# Patient Record
Sex: FEMALE | Race: WHITE | Employment: FULL TIME | URBAN - METROPOLITAN AREA
[De-identification: names, ages, dates, MRNs, and addresses within clinical notes are randomized per-mention and may not be internally consistent; named-entity substitution may affect disease eponyms.]

---

## 2020-01-11 ENCOUNTER — HOSPITAL ENCOUNTER (EMERGENCY)
Age: 40
Discharge: HOME | End: 2020-01-11
Attending: EMERGENCY MEDICINE
Payer: COMMERCIAL

## 2020-01-11 VITALS
WEIGHT: 160 LBS | RESPIRATION RATE: 18 BRPM | TEMPERATURE: 98.1 F | BODY MASS INDEX: 24.25 KG/M2 | HEART RATE: 84 BPM | HEIGHT: 68 IN | SYSTOLIC BLOOD PRESSURE: 119 MMHG | OXYGEN SATURATION: 97 % | DIASTOLIC BLOOD PRESSURE: 87 MMHG

## 2020-01-11 DIAGNOSIS — R60.0 PERIORBITAL EDEMA: Primary | ICD-10-CM

## 2020-01-11 PROCEDURE — 99283 EMERGENCY DEPT VISIT LOW MDM: CPT | Performed by: EMERGENCY MEDICINE

## 2020-01-11 RX ORDER — LORATADINE 10 MG/1
10 TABLET ORAL DAILY
Qty: 30 TAB | Refills: 1 | Status: SHIPPED | OUTPATIENT
Start: 2020-01-11 | End: 2021-03-05

## 2020-01-11 RX ORDER — PREDNISONE 20 MG/1
60 TABLET ORAL DAILY
Qty: 15 TAB | Refills: 0 | Status: SHIPPED | OUTPATIENT
Start: 2020-01-11 | End: 2020-01-16

## 2020-01-11 RX ORDER — ZOLPIDEM TARTRATE 12.5 MG/1
12.5 TABLET, FILM COATED, EXTENDED RELEASE ORAL
COMMUNITY
Start: 2019-11-20

## 2020-01-11 NOTE — DISCHARGE INSTRUCTIONS
Use the medications as prescribed. Apply a cool compress or ice pack to the eyes to decrease swelling. Follow-up with your primary care physician or return to the emergency department for any new or acute concerns.

## 2020-01-11 NOTE — ED PROVIDER NOTES
Patient is a 69-year-old female who comes to the emergency department today worried about an allergic reaction. She states she is having swelling below both of her eyes since yesterday morning. No chest pain, dyspnea, no throat tightness, no eye pain or vision changes. States that she is prone to reactions. She did use some fake eyelashes the day before. But states she has used those before without reaction. Reports that she was seen at an urgent care facility yesterday and given a shot of steroids but today the swelling was not any better so she came here. The history is provided by the patient. Allergic Reaction    This is a new problem. The current episode started yesterday. The problem has not changed since onset. Pertinent negatives include no nausea and no shortness of breath. No past medical history on file. No past surgical history on file. No family history on file.     Social History     Socioeconomic History    Marital status:      Spouse name: Not on file    Number of children: Not on file    Years of education: Not on file    Highest education level: Not on file   Occupational History    Not on file   Social Needs    Financial resource strain: Not on file    Food insecurity:     Worry: Not on file     Inability: Not on file    Transportation needs:     Medical: Not on file     Non-medical: Not on file   Tobacco Use    Smoking status: Never Smoker    Smokeless tobacco: Never Used   Substance and Sexual Activity    Alcohol use: Not Currently    Drug use: Not on file    Sexual activity: Not on file   Lifestyle    Physical activity:     Days per week: Not on file     Minutes per session: Not on file    Stress: Not on file   Relationships    Social connections:     Talks on phone: Not on file     Gets together: Not on file     Attends Latter day service: Not on file     Active member of club or organization: Not on file     Attends meetings of clubs or organizations: Not on file     Relationship status: Not on file    Intimate partner violence:     Fear of current or ex partner: Not on file     Emotionally abused: Not on file     Physically abused: Not on file     Forced sexual activity: Not on file   Other Topics Concern    Not on file   Social History Narrative    Not on file         ALLERGIES: Sulfa (sulfonamide antibiotics)    Review of Systems   Constitutional: Negative for chills, fatigue and fever. HENT: Negative for congestion, rhinorrhea and sore throat. Eyes: Negative for photophobia, pain, discharge, redness, itching and visual disturbance. Respiratory: Negative for cough and shortness of breath. Cardiovascular: Negative for chest pain and palpitations. Gastrointestinal: Negative for abdominal pain, diarrhea and nausea. Endocrine: Negative for polydipsia and polyuria. Genitourinary: Negative for dysuria, frequency and urgency. Musculoskeletal: Negative for back pain and neck pain. Skin: Negative for rash. Neurological: Negative for seizures, syncope and weakness. Hematological: Negative. Vitals:    01/11/20 0932 01/11/20 0934   BP: 127/76    Pulse:  84   Resp:  18   Temp:  98.1 °F (36.7 °C)   SpO2:  98%   Weight:  72.6 kg (160 lb)   Height:  5' 8\" (1.727 m)            Physical Exam  Vitals signs and nursing note reviewed. Constitutional:       Appearance: She is well-developed. HENT:      Head: Normocephalic and atraumatic. Nose: Nose normal.      Mouth/Throat:      Pharynx: Oropharynx is clear. Eyes:      General: No scleral icterus. Right eye: No discharge. Left eye: No discharge. Extraocular Movements: Extraocular movements intact. Conjunctiva/sclera: Conjunctivae normal.      Pupils: Pupils are equal, round, and reactive to light. Comments: Mild infraorbital swelling bilaterally   Neck:      Musculoskeletal: Normal range of motion and neck supple.    Cardiovascular:      Rate and Rhythm: Normal rate and regular rhythm. Pulmonary:      Effort: Pulmonary effort is normal.      Breath sounds: Normal breath sounds. No stridor. No wheezing. Abdominal:      Palpations: Abdomen is soft. Tenderness: There is no tenderness. There is no guarding or rebound. Musculoskeletal: Normal range of motion. General: No deformity. Lymphadenopathy:      Cervical: No cervical adenopathy. Skin:     General: Skin is warm and dry. Findings: No rash. Neurological:      Mental Status: She is alert and oriented to person, place, and time. GCS: GCS eye subscore is 4. GCS verbal subscore is 5. GCS motor subscore is 6. Cranial Nerves: No cranial nerve deficit. Sensory: No sensory deficit. MDM  Number of Diagnoses or Management Options  Diagnosis management comments: No sign of anaphylaxis or systemic reaction at this time. No urticaria or itching. I think this may all be a local inflammatory response possibly from the eyelashes he used I will place her on to an antihistamine and a course of oral prednisone for a few days. Advised her to apply a cool compress for symptomatic relief as well. Voice dictation software was used during the making of this note. This software is not perfect and grammatical and other typographical errors may be present. This note has been proofread, but may still contain errors.   Alva Byrd MD; 1/11/2020 @9:54 AM   ===================================================================      Risk of Complications, Morbidity, and/or Mortality  Presenting problems: low  Diagnostic procedures: minimal  Management options: minimal    Patient Progress  Patient progress: stable         Procedures

## 2020-01-11 NOTE — ED TRIAGE NOTES
Pt in states swelling to eyes since yesterday. States seen at urgent care and given steroid injection. States swelling has not improved.

## 2020-01-11 NOTE — ED NOTES
I have reviewed discharge instructions with the patient. The patient verbalized understanding. Patient left ED via Discharge Method: ambulatory to Home with boyfriend. Opportunity for questions and clarification provided. Patient given 2 scripts. To continue your aftercare when you leave the hospital, you may receive an automated call from our care team to check in on how you are doing. This is a free service and part of our promise to provide the best care and service to meet your aftercare needs.  If you have questions, or wish to unsubscribe from this service please call [phone]. Thank you for Choosing our [facility] Emergency Department.

## 2021-03-05 ENCOUNTER — HOSPITAL ENCOUNTER (EMERGENCY)
Age: 41
Discharge: HOME | End: 2021-03-05
Attending: EMERGENCY MEDICINE | Admitting: EMERGENCY MEDICINE
Payer: COMMERCIAL

## 2021-03-05 ENCOUNTER — APPOINTMENT (OUTPATIENT)
Dept: GENERAL RADIOLOGY | Age: 41
End: 2021-03-05
Attending: EMERGENCY MEDICINE
Payer: COMMERCIAL

## 2021-03-05 VITALS
HEART RATE: 84 BPM | BODY MASS INDEX: 25.71 KG/M2 | WEIGHT: 160 LBS | RESPIRATION RATE: 12 BRPM | TEMPERATURE: 98.2 F | SYSTOLIC BLOOD PRESSURE: 138 MMHG | DIASTOLIC BLOOD PRESSURE: 61 MMHG | HEIGHT: 66 IN | OXYGEN SATURATION: 100 %

## 2021-03-05 DIAGNOSIS — M25.562 ACUTE PAIN OF LEFT KNEE: Primary | ICD-10-CM

## 2021-03-05 PROCEDURE — 73562 X-RAY EXAM OF KNEE 3: CPT

## 2021-03-05 PROCEDURE — 74011250637 HC RX REV CODE- 250/637: Performed by: PHYSICIAN ASSISTANT

## 2021-03-05 PROCEDURE — 99283 EMERGENCY DEPT VISIT LOW MDM: CPT

## 2021-03-05 RX ORDER — ACETAMINOPHEN 500 MG
1000 TABLET ORAL
Status: COMPLETED | OUTPATIENT
Start: 2021-03-05 | End: 2021-03-05

## 2021-03-05 RX ADMIN — ACETAMINOPHEN 1000 MG: 500 TABLET ORAL at 12:13

## 2021-03-05 NOTE — ED NOTES
I have reviewed discharge instructions with the patient. The patient verbalized understanding. Patient left ED via Discharge Method: ambulatory to Home. Opportunity for questions and clarification provided. Patient given 0 scripts. To continue your aftercare when you leave the hospital, you may receive an automated call from our care team to check in on how you are doing. This is a free service and part of our promise to provide the best care and service to meet your aftercare needs.  If you have questions, or wish to unsubscribe from this service please call [phone]. Thank you for Choosing our [facility] Emergency Department.

## 2021-03-05 NOTE — ED TRIAGE NOTES
Pt states she was playing tennis approx 30 minutes ago when her left knee buckled, she states she felt a \"pop\" and has been unable to put pressure on it since.

## 2021-03-05 NOTE — ED PROVIDER NOTES
PT is a 53-year-old female who presents to emergency room with chief complaint of left knee pain that occurred while playing tennis. She states she planted her foot felt a pop in her knee and then the knee gave out. She has been unable to bear weight without pain and sensation of knee giving out. Pain is aggravated by weight bearing. Alleviated by immobilization. The history is provided by the patient. Knee Pain   This is a new problem. The current episode started less than 1 hour ago. The problem has not changed since onset. The pain is present in the left knee. The quality of the pain is described as aching. There has been no history of extremity trauma. No past medical history on file. No past surgical history on file. No family history on file.     Social History     Socioeconomic History    Marital status:      Spouse name: Not on file    Number of children: Not on file    Years of education: Not on file    Highest education level: Not on file   Occupational History    Not on file   Social Needs    Financial resource strain: Not on file    Food insecurity     Worry: Not on file     Inability: Not on file    Transportation needs     Medical: Not on file     Non-medical: Not on file   Tobacco Use    Smoking status: Never Smoker    Smokeless tobacco: Never Used   Substance and Sexual Activity    Alcohol use: Not Currently    Drug use: Not on file    Sexual activity: Not on file   Lifestyle    Physical activity     Days per week: Not on file     Minutes per session: Not on file    Stress: Not on file   Relationships    Social connections     Talks on phone: Not on file     Gets together: Not on file     Attends [employer] service: Not on file     Active member of club or organization: Not on file     Attends meetings of clubs or organizations: Not on file     Relationship status: Not on file    Intimate partner violence     Fear of current or ex partner: Not on file Emotionally abused: Not on file     Physically abused: Not on file     Forced sexual activity: Not on file   Other Topics Concern    Not on file   Social History Narrative    Not on file         ALLERGIES: Sulfa (sulfonamide antibiotics)    Review of Systems   Constitutional: Negative for chills and fever. HENT: Negative for facial swelling. Respiratory: Negative for chest tightness and shortness of breath. Cardiovascular: Negative for chest pain. Gastrointestinal: Negative for abdominal pain, nausea and vomiting. Musculoskeletal: Positive for arthralgias (L knee). Negative for myalgias. Neurological: Negative for headaches. Psychiatric/Behavioral: Negative for confusion. Vitals:    03/05/21 1127 03/05/21 1133   BP: 138/61    Pulse: 84    Resp: 12    Temp:  98.2 °F (36.8 °C)   SpO2: 100%    Weight: 72.6 kg (160 lb)    Height: 5' 6\" (1.676 m)             Physical Exam  Constitutional:       Appearance: Normal appearance. HENT:      Head: Normocephalic and atraumatic. Mouth/Throat:      Mouth: Mucous membranes are moist.   Eyes:      Pupils: Pupils are equal, round, and reactive to light. Cardiovascular:      Rate and Rhythm: Normal rate and regular rhythm. Pulmonary:      Effort: No respiratory distress. Breath sounds: Normal breath sounds. Abdominal:      Palpations: Abdomen is soft. Tenderness: There is no abdominal tenderness. There is no guarding. Musculoskeletal:      Left knee: She exhibits swelling and bony tenderness. She exhibits no deformity. Skin:     General: Skin is warm and dry. Neurological:      Mental Status: She is alert and oriented to person, place, and time. Mental status is at baseline. Psychiatric:         Mood and Affect: Mood normal.          [facility]         Splint, Other    Date/Time: 3/5/2021 12:11 PM  Performed by: ROSS Tee  Authorized by:  ROSS Tee     Consent:     Consent obtained:  Verbal  Pre-procedure details: Sensation:  Normal  Procedure details:     Laterality:  Left    Location:  Knee    Knee:  L knee    Splint type:  Knee immobilizer  Post-procedure details:     Pain:  Improved    Sensation:  Normal    Patient tolerance of procedure:   Tolerated well, no immediate complications

## 2021-03-09 PROBLEM — S83.519A ACL (ANTERIOR CRUCIATE LIGAMENT) RUPTURE: Status: ACTIVE | Noted: 2021-03-09

## 2021-03-09 PROBLEM — S83.249A MEDIAL MENISCUS TEAR: Status: ACTIVE | Noted: 2021-03-09

## 2021-03-16 PROBLEM — L30.9 PERIORBITAL DERMATITIS: Status: ACTIVE | Noted: 2019-06-25

## 2021-03-16 PROBLEM — M25.562 LEFT KNEE PAIN: Status: ACTIVE | Noted: 2021-03-16

## 2021-03-16 PROBLEM — T78.3XXA ANGIO-EDEMA: Status: ACTIVE | Noted: 2019-06-25

## 2021-03-16 PROBLEM — L50.8 CHRONIC URTICARIA: Status: ACTIVE | Noted: 2019-06-25

## 2021-03-16 PROBLEM — T63.441A: Status: ACTIVE | Noted: 2019-06-25

## 2021-03-16 PROBLEM — S83.242A ACUTE MEDIAL MENISCUS TEAR, LEFT, INITIAL ENCOUNTER: Status: ACTIVE | Noted: 2021-03-09

## 2021-03-16 PROBLEM — K59.04 CHRONIC IDIOPATHIC CONSTIPATION: Status: ACTIVE | Noted: 2020-11-20

## 2021-03-16 PROBLEM — Z80.0 FAMILY HISTORY OF COLON CANCER: Status: ACTIVE | Noted: 2020-11-20

## 2021-03-16 PROBLEM — T78.2XXA ANAPHYLAXIS: Status: ACTIVE | Noted: 2019-06-25

## 2021-03-22 PROBLEM — S83.512A LEFT ANTERIOR CRUCIATE LIGAMENT TEAR: Status: ACTIVE | Noted: 2021-03-22

## 2021-03-30 ENCOUNTER — ANESTHESIA EVENT (OUTPATIENT)
Dept: SURGERY | Age: 41
End: 2021-03-30
Payer: COMMERCIAL

## 2021-03-31 ENCOUNTER — ANESTHESIA (OUTPATIENT)
Dept: SURGERY | Age: 41
End: 2021-03-31
Payer: COMMERCIAL

## 2021-03-31 ENCOUNTER — HOSPITAL ENCOUNTER (OUTPATIENT)
Age: 41
Setting detail: OUTPATIENT SURGERY
Discharge: HOME | End: 2021-03-31
Attending: ORTHOPAEDIC SURGERY | Admitting: ORTHOPAEDIC SURGERY
Payer: COMMERCIAL

## 2021-03-31 VITALS
RESPIRATION RATE: 16 BRPM | OXYGEN SATURATION: 100 % | HEART RATE: 68 BPM | SYSTOLIC BLOOD PRESSURE: 100 MMHG | TEMPERATURE: 98 F | WEIGHT: 162 LBS | DIASTOLIC BLOOD PRESSURE: 56 MMHG | BODY MASS INDEX: 26.15 KG/M2

## 2021-03-31 DIAGNOSIS — S83.512D RUPTURE OF ANTERIOR CRUCIATE LIGAMENT OF LEFT KNEE, SUBSEQUENT ENCOUNTER: ICD-10-CM

## 2021-03-31 DIAGNOSIS — S83.242A ACUTE MEDIAL MENISCUS TEAR, LEFT, INITIAL ENCOUNTER: ICD-10-CM

## 2021-03-31 LAB — HCG UR QL: NEGATIVE

## 2021-03-31 PROCEDURE — 77030040936 HC WND COBLATN S&N -C: Performed by: ORTHOPAEDIC SURGERY

## 2021-03-31 PROCEDURE — 2709999900 HC NON-CHARGEABLE SUPPLY: Performed by: ORTHOPAEDIC SURGERY

## 2021-03-31 PROCEDURE — 77030002991 HC SUT QUILL SSPC -B: Performed by: ORTHOPAEDIC SURGERY

## 2021-03-31 PROCEDURE — 77030003602 HC NDL NRV BLK BBMI -B: Performed by: ANESTHESIOLOGY

## 2021-03-31 PROCEDURE — 76010000162 HC OR TIME 1.5 TO 2 HR INTENSV-TIER 1: Performed by: ORTHOPAEDIC SURGERY

## 2021-03-31 PROCEDURE — 74011250636 HC RX REV CODE- 250/636: Performed by: ORTHOPAEDIC SURGERY

## 2021-03-31 PROCEDURE — 76060000034 HC ANESTHESIA 1.5 TO 2 HR: Performed by: ORTHOPAEDIC SURGERY

## 2021-03-31 PROCEDURE — 77030006891 HC BLD SHV RESECT STRY -B: Performed by: ORTHOPAEDIC SURGERY

## 2021-03-31 PROCEDURE — C1713 ANCHOR/SCREW BN/BN,TIS/BN: HCPCS | Performed by: ORTHOPAEDIC SURGERY

## 2021-03-31 PROCEDURE — C1762 CONN TISS, HUMAN(INC FASCIA): HCPCS | Performed by: ORTHOPAEDIC SURGERY

## 2021-03-31 PROCEDURE — 77030002922 HC SUT FBRWRE ARTH -B: Performed by: ORTHOPAEDIC SURGERY

## 2021-03-31 PROCEDURE — 77030003666 HC NDL SPINAL BD -A: Performed by: ORTHOPAEDIC SURGERY

## 2021-03-31 PROCEDURE — 76010010054 HC POST OP PAIN BLOCK: Performed by: ORTHOPAEDIC SURGERY

## 2021-03-31 PROCEDURE — 77030000032 HC CUF TRNQT ZIMM -B: Performed by: ORTHOPAEDIC SURGERY

## 2021-03-31 PROCEDURE — 76210000020 HC REC RM PH II FIRST 0.5 HR: Performed by: ORTHOPAEDIC SURGERY

## 2021-03-31 PROCEDURE — 81025 URINE PREGNANCY TEST: CPT

## 2021-03-31 PROCEDURE — 29888 ARTHRS AID ACL RPR/AGMNTJ: CPT | Performed by: ORTHOPAEDIC SURGERY

## 2021-03-31 PROCEDURE — 76010010054 HC POST OP PAIN BLOCK

## 2021-03-31 PROCEDURE — 74011250637 HC RX REV CODE- 250/637: Performed by: ORTHOPAEDIC SURGERY

## 2021-03-31 PROCEDURE — 77030037713 HC CLOSR DEV INCIS ZIP STRY -B: Performed by: ORTHOPAEDIC SURGERY

## 2021-03-31 PROCEDURE — 77030018673: Performed by: ORTHOPAEDIC SURGERY

## 2021-03-31 PROCEDURE — 74011000250 HC RX REV CODE- 250: Performed by: ANESTHESIOLOGY

## 2021-03-31 PROCEDURE — 77030002934 HC SUT MCRYL J&J -B: Performed by: ORTHOPAEDIC SURGERY

## 2021-03-31 PROCEDURE — 77030020274 HC MISC IMPL ORTHOPEDIC: Performed by: ORTHOPAEDIC SURGERY

## 2021-03-31 PROCEDURE — 77030006590 HC BLD ARTHSC GRFT J&J -C: Performed by: ORTHOPAEDIC SURGERY

## 2021-03-31 PROCEDURE — 74011250637 HC RX REV CODE- 250/637: Performed by: ANESTHESIOLOGY

## 2021-03-31 PROCEDURE — 76210000063 HC OR PH I REC FIRST 0.5 HR: Performed by: ORTHOPAEDIC SURGERY

## 2021-03-31 PROCEDURE — 77030019908 HC STETH ESOPH SIMS -A: Performed by: ANESTHESIOLOGY

## 2021-03-31 PROCEDURE — 76942 ECHO GUIDE FOR BIOPSY: CPT | Performed by: ORTHOPAEDIC SURGERY

## 2021-03-31 PROCEDURE — 74011000250 HC RX REV CODE- 250: Performed by: NURSE ANESTHETIST, CERTIFIED REGISTERED

## 2021-03-31 PROCEDURE — 77030010509 HC AIRWY LMA MSK TELE -A: Performed by: ANESTHESIOLOGY

## 2021-03-31 PROCEDURE — 77030040922 HC BLNKT HYPOTHRM STRY -A: Performed by: ANESTHESIOLOGY

## 2021-03-31 PROCEDURE — 74011250636 HC RX REV CODE- 250/636: Performed by: NURSE ANESTHETIST, CERTIFIED REGISTERED

## 2021-03-31 PROCEDURE — 29882 ARTHRS KNE SRG MNISC RPR M/L: CPT | Performed by: ORTHOPAEDIC SURGERY

## 2021-03-31 PROCEDURE — 77030002933 HC SUT MCRYL J&J -A: Performed by: ORTHOPAEDIC SURGERY

## 2021-03-31 PROCEDURE — 74011250636 HC RX REV CODE- 250/636: Performed by: ANESTHESIOLOGY

## 2021-03-31 PROCEDURE — 77030033005 HC TBNG ARTHSC PMP STRY -B: Performed by: ORTHOPAEDIC SURGERY

## 2021-03-31 PROCEDURE — 77030042407 HC SUT FBRWRE -B: Performed by: ORTHOPAEDIC SURGERY

## 2021-03-31 DEVICE — PASSER SUT ACL RECON DBL LD TIGHTROPE: Type: IMPLANTABLE DEVICE | Site: KNEE | Status: FUNCTIONAL

## 2021-03-31 DEVICE — KIT INT FIX W4.75XL19.1MM 2ND W/ SWIVELOCK FOR ACL/PCL REP: Type: IMPLANTABLE DEVICE | Site: KNEE | Status: FUNCTIONAL

## 2021-03-31 DEVICE — BUTTON FIX W8XL12MM TI ATTCH SYS ALLGRFT CONSTRUCT FOR: Type: IMPLANTABLE DEVICE | Site: KNEE | Status: FUNCTIONAL

## 2021-03-31 DEVICE — SYS TIGHTROPE ABS W UHMWPE --: Type: IMPLANTABLE DEVICE | Site: KNEE | Status: FUNCTIONAL

## 2021-03-31 RX ORDER — LIDOCAINE HYDROCHLORIDE 20 MG/ML
INJECTION, SOLUTION EPIDURAL; INFILTRATION; INTRACAUDAL; PERINEURAL AS NEEDED
Status: DISCONTINUED | OUTPATIENT
Start: 2021-03-31 | End: 2021-03-31 | Stop reason: HOSPADM

## 2021-03-31 RX ORDER — MIDAZOLAM HYDROCHLORIDE 1 MG/ML
2 INJECTION, SOLUTION INTRAMUSCULAR; INTRAVENOUS
Status: COMPLETED | OUTPATIENT
Start: 2021-03-31 | End: 2021-03-31

## 2021-03-31 RX ORDER — NALOXONE HYDROCHLORIDE 0.4 MG/ML
0.1 INJECTION, SOLUTION INTRAMUSCULAR; INTRAVENOUS; SUBCUTANEOUS
Status: DISCONTINUED | OUTPATIENT
Start: 2021-03-31 | End: 2021-03-31 | Stop reason: HOSPADM

## 2021-03-31 RX ORDER — LIDOCAINE HYDROCHLORIDE 10 MG/ML
0.1 INJECTION INFILTRATION; PERINEURAL AS NEEDED
Status: DISCONTINUED | OUTPATIENT
Start: 2021-03-31 | End: 2021-03-31 | Stop reason: HOSPADM

## 2021-03-31 RX ORDER — HYDROMORPHONE HYDROCHLORIDE 1 MG/ML
0.5 INJECTION, SOLUTION INTRAMUSCULAR; INTRAVENOUS; SUBCUTANEOUS
Status: DISCONTINUED | OUTPATIENT
Start: 2021-03-31 | End: 2021-03-31 | Stop reason: HOSPADM

## 2021-03-31 RX ORDER — MIDAZOLAM HYDROCHLORIDE 1 MG/ML
2 INJECTION, SOLUTION INTRAMUSCULAR; INTRAVENOUS
Status: DISCONTINUED | OUTPATIENT
Start: 2021-03-31 | End: 2021-03-31 | Stop reason: HOSPADM

## 2021-03-31 RX ORDER — PROPOFOL 10 MG/ML
INJECTION, EMULSION INTRAVENOUS AS NEEDED
Status: DISCONTINUED | OUTPATIENT
Start: 2021-03-31 | End: 2021-03-31 | Stop reason: HOSPADM

## 2021-03-31 RX ORDER — SODIUM CHLORIDE 0.9 % (FLUSH) 0.9 %
5-40 SYRINGE (ML) INJECTION AS NEEDED
Status: DISCONTINUED | OUTPATIENT
Start: 2021-03-31 | End: 2021-03-31 | Stop reason: HOSPADM

## 2021-03-31 RX ORDER — MINERAL OIL
OIL (ML) MISCELLANEOUS AS NEEDED
Status: DISCONTINUED | OUTPATIENT
Start: 2021-03-31 | End: 2021-03-31 | Stop reason: HOSPADM

## 2021-03-31 RX ORDER — HALOPERIDOL 5 MG/ML
1 INJECTION INTRAMUSCULAR
Status: DISCONTINUED | OUTPATIENT
Start: 2021-03-31 | End: 2021-03-31 | Stop reason: HOSPADM

## 2021-03-31 RX ORDER — TRANEXAMIC ACID 100 MG/ML
INJECTION, SOLUTION INTRAVENOUS AS NEEDED
Status: DISCONTINUED | OUTPATIENT
Start: 2021-03-31 | End: 2021-03-31 | Stop reason: HOSPADM

## 2021-03-31 RX ORDER — OXYCODONE HYDROCHLORIDE 5 MG/1
5 TABLET ORAL
Status: DISCONTINUED | OUTPATIENT
Start: 2021-03-31 | End: 2021-03-31 | Stop reason: HOSPADM

## 2021-03-31 RX ORDER — FENTANYL CITRATE 50 UG/ML
100 INJECTION, SOLUTION INTRAMUSCULAR; INTRAVENOUS
Status: COMPLETED | OUTPATIENT
Start: 2021-03-31 | End: 2021-03-31

## 2021-03-31 RX ORDER — ACETAMINOPHEN 500 MG
1000 TABLET ORAL ONCE
Status: COMPLETED | OUTPATIENT
Start: 2021-03-31 | End: 2021-03-31

## 2021-03-31 RX ORDER — KETOROLAC TROMETHAMINE 30 MG/ML
30 INJECTION, SOLUTION INTRAMUSCULAR; INTRAVENOUS ONCE
Status: COMPLETED | OUTPATIENT
Start: 2021-03-31 | End: 2021-03-31

## 2021-03-31 RX ORDER — DEXAMETHASONE SODIUM PHOSPHATE 4 MG/ML
INJECTION, SOLUTION INTRA-ARTICULAR; INTRALESIONAL; INTRAMUSCULAR; INTRAVENOUS; SOFT TISSUE
Status: COMPLETED | OUTPATIENT
Start: 2021-03-31 | End: 2021-03-31

## 2021-03-31 RX ORDER — FENTANYL CITRATE 50 UG/ML
INJECTION, SOLUTION INTRAMUSCULAR; INTRAVENOUS AS NEEDED
Status: DISCONTINUED | OUTPATIENT
Start: 2021-03-31 | End: 2021-03-31 | Stop reason: HOSPADM

## 2021-03-31 RX ORDER — FLUMAZENIL 0.1 MG/ML
0.2 INJECTION INTRAVENOUS
Status: DISCONTINUED | OUTPATIENT
Start: 2021-03-31 | End: 2021-03-31 | Stop reason: HOSPADM

## 2021-03-31 RX ORDER — SODIUM CHLORIDE, SODIUM LACTATE, POTASSIUM CHLORIDE, CALCIUM CHLORIDE 600; 310; 30; 20 MG/100ML; MG/100ML; MG/100ML; MG/100ML
100 INJECTION, SOLUTION INTRAVENOUS CONTINUOUS
Status: DISCONTINUED | OUTPATIENT
Start: 2021-03-31 | End: 2021-03-31 | Stop reason: HOSPADM

## 2021-03-31 RX ORDER — DIPHENHYDRAMINE HYDROCHLORIDE 50 MG/ML
12.5 INJECTION, SOLUTION INTRAMUSCULAR; INTRAVENOUS
Status: DISCONTINUED | OUTPATIENT
Start: 2021-03-31 | End: 2021-03-31 | Stop reason: HOSPADM

## 2021-03-31 RX ORDER — SODIUM CHLORIDE, SODIUM LACTATE, POTASSIUM CHLORIDE, CALCIUM CHLORIDE 600; 310; 30; 20 MG/100ML; MG/100ML; MG/100ML; MG/100ML
75 INJECTION, SOLUTION INTRAVENOUS CONTINUOUS
Status: DISCONTINUED | OUTPATIENT
Start: 2021-03-31 | End: 2021-03-31 | Stop reason: HOSPADM

## 2021-03-31 RX ORDER — ONDANSETRON 2 MG/ML
INJECTION INTRAMUSCULAR; INTRAVENOUS AS NEEDED
Status: DISCONTINUED | OUTPATIENT
Start: 2021-03-31 | End: 2021-03-31 | Stop reason: HOSPADM

## 2021-03-31 RX ORDER — SODIUM CHLORIDE 0.9 % (FLUSH) 0.9 %
5-40 SYRINGE (ML) INJECTION EVERY 8 HOURS
Status: DISCONTINUED | OUTPATIENT
Start: 2021-03-31 | End: 2021-03-31 | Stop reason: HOSPADM

## 2021-03-31 RX ORDER — CEFAZOLIN SODIUM/WATER 2 G/20 ML
2 SYRINGE (ML) INTRAVENOUS ONCE
Status: COMPLETED | OUTPATIENT
Start: 2021-03-31 | End: 2021-03-31

## 2021-03-31 RX ADMIN — DEXAMETHASONE SODIUM PHOSPHATE 4 MG: 4 INJECTION, SOLUTION INTRAMUSCULAR; INTRAVENOUS at 08:19

## 2021-03-31 RX ADMIN — ROPIVACAINE HYDROCHLORIDE 30 ML: 5 INJECTION, SOLUTION EPIDURAL; INFILTRATION; PERINEURAL at 08:16

## 2021-03-31 RX ADMIN — DEXAMETHASONE SODIUM PHOSPHATE 4 MG: 4 INJECTION, SOLUTION INTRAMUSCULAR; INTRAVENOUS at 08:16

## 2021-03-31 RX ADMIN — CEFAZOLIN 2 G: 1 INJECTION, POWDER, FOR SOLUTION INTRAVENOUS at 08:40

## 2021-03-31 RX ADMIN — SODIUM CHLORIDE, SODIUM LACTATE, POTASSIUM CHLORIDE, AND CALCIUM CHLORIDE 100 ML/HR: 600; 310; 30; 20 INJECTION, SOLUTION INTRAVENOUS at 07:40

## 2021-03-31 RX ADMIN — KETOROLAC TROMETHAMINE 30 MG: 30 INJECTION, SOLUTION INTRAMUSCULAR at 11:10

## 2021-03-31 RX ADMIN — FENTANYL CITRATE 25 MCG: 50 INJECTION INTRAMUSCULAR; INTRAVENOUS at 09:46

## 2021-03-31 RX ADMIN — ROPIVACAINE HYDROCHLORIDE 20 ML: 5 INJECTION, SOLUTION EPIDURAL; INFILTRATION; PERINEURAL at 08:19

## 2021-03-31 RX ADMIN — ONDANSETRON 4 MG: 2 INJECTION INTRAMUSCULAR; INTRAVENOUS at 09:50

## 2021-03-31 RX ADMIN — PROPOFOL 200 MG: 10 INJECTION, EMULSION INTRAVENOUS at 08:32

## 2021-03-31 RX ADMIN — ACETAMINOPHEN 1000 MG: 500 TABLET, FILM COATED ORAL at 08:13

## 2021-03-31 RX ADMIN — LIDOCAINE HYDROCHLORIDE 40 MG: 20 INJECTION, SOLUTION EPIDURAL; INFILTRATION; INTRACAUDAL; PERINEURAL at 08:32

## 2021-03-31 RX ADMIN — TRANEXAMIC ACID 1 G: 100 INJECTION, SOLUTION INTRAVENOUS at 08:42

## 2021-03-31 RX ADMIN — FENTANYL CITRATE 25 MCG: 50 INJECTION INTRAMUSCULAR; INTRAVENOUS at 08:54

## 2021-03-31 RX ADMIN — MIDAZOLAM 2 MG: 1 INJECTION INTRAMUSCULAR; INTRAVENOUS at 08:10

## 2021-03-31 RX ADMIN — HYDROMORPHONE HYDROCHLORIDE 0.5 MG: 1 INJECTION, SOLUTION INTRAMUSCULAR; INTRAVENOUS; SUBCUTANEOUS at 10:35

## 2021-03-31 RX ADMIN — HYDROMORPHONE HYDROCHLORIDE 0.5 MG: 1 INJECTION, SOLUTION INTRAMUSCULAR; INTRAVENOUS; SUBCUTANEOUS at 10:44

## 2021-03-31 RX ADMIN — FENTANYL CITRATE 100 MCG: 50 INJECTION, SOLUTION INTRAMUSCULAR; INTRAVENOUS at 08:10

## 2021-03-31 NOTE — DISCHARGE INSTRUCTIONS
ACTIVITY  · As tolerated and as directed by your doctor. · Bathe or shower as directed by your doctor. DIET  · Clear liquids until no nausea or vomiting; then light diet for the first day. · Advance to regular diet on second day, unless your doctor orders otherwise. · If nausea and vomiting continues, call your doctor. PAIN  · Take pain medication as directed by your doctor. · Call your doctor if pain is NOT relieved by medication. · DO NOT take aspirin of blood thinners unless directed by your doctor. DRESSING CARE       CALL YOUR DOCTOR IF   · Excessive bleeding that does not stop after holding pressure over the area  · Temperature of 101 degrees F or above  · Excessive redness, swelling or bruising, and/ or green or yellow, smelly discharge from incision    AFTER ANESTHESIA   · For the first 24 hours: DO NOT Drive, Drink alcoholic beverages, or Make important decisions. · Be aware of dizziness following anesthesia and while taking pain medication. APPOINTMENT DATE/ TIME    YOUR DOCTOR'S PHONE NUMBER       DISCHARGE SUMMARY from Nurse    PATIENT INSTRUCTIONS:    After general anesthesia or intravenous sedation, for 24 hours or while taking prescription Narcotics:  · Limit your activities  · Do not drive and operate hazardous machinery  · Do not make important personal or business decisions  · Do  not drink alcoholic beverages  · If you have not urinated within 8 hours after discharge, please contact your surgeon on call. *  Please give a list of your current medications to your Primary Care Provider. *  Please update this list whenever your medications are discontinued, doses are      changed, or new medications (including over-the-counter products) are added. *  Please carry medication information at all times in case of emergency situations.       These are general instructions for a healthy lifestyle:    No smoking/ No tobacco products/ Avoid exposure to second hand smoke    Surgeon General's Warning:  Quitting smoking now greatly reduces serious risk to your health. Obesity, smoking, and sedentary lifestyle greatly increases your risk for illness    A healthy diet, regular physical exercise & weight monitoring are important for maintaining a healthy lifestyle    You may be retaining fluid if you have a history of heart failure or if you experience any of the following symptoms:  Weight gain of 3 pounds or more overnight or 5 pounds in a week, increased swelling in our hands or feet or shortness of breath while lying flat in bed. Please call your doctor as soon as you notice any of these symptoms; do not wait until your next office visit. Recognize signs and symptoms of STROKE:    F-face looks uneven    A-arms unable to move or move unevenly    S-speech slurred or non-existent    T-time-call 911 as soon as signs and symptoms begin-DO NOT go       Back to bed or wait to see if you get better-TIME IS BRAIN. Patient Education        Learning About COVID-19 and Social Distancing  What is it? Social distancing means putting space between yourself and other people. The recommended distance is 6 feet, or about 2 meters. This also means staying away from any place where people may gather, such as [place] or other public gathering places. Why is it important? Social distancing is the best way to reduce the spread of COVID-19. This virus seems to spread from person to person through droplets from coughing and sneezing. So if you keep your distance from others, you're less likely to get it or spread it. And social distancing is important for everyone, not just those who are at high risk of infection, like older people. You might have the virus but not have symptoms. You could then give the infection to someone you come into contact with. How is it done? Putting 6 feet, or about 2 meters, between you and other people is the recommended distance.  Also stay away from any place where people may gather, such as [place] or other public gathering places. So if possible:  · Work from home, and keep your kids at home. · Don't travel if you don't have to. And avoid public transportation, ride-shares, and taxis unless you have no choice. · Limit shopping to essentials, like food and medicines. · Wear a cloth face cover if you have to go to a public place like the grocery store or pharmacy. · Don't eat in restaurants. (You can still get takeout or food deliveries.)  · Avoid crowds and busy places. Follow stay-at-home orders or other directions for your area. Where can you learn more? Go to http://www.gray.com/  Enter A133 in the search box to learn more about \"Learning About COVID-19 and Social Distancing. \"  Current as of: December 18, 2020               Content Version: 12.7  © 5757-1347 [employer]. Care instructions adapted under license by [employer] (which disclaims liability or warranty for this information). If you have questions about a medical condition or this instruction, always ask your healthcare professional. [place] [address] any warranty or liability for your use of this information. POST-OPERATIVE INFORMATION ACL RECONSTRUCTION    Returning Home  Your pain after surgery will vary depending on the method of anesthesia used and from patient to patient. In the first 24 hours, pain medication should be taken regularly with small amounts of food. During this time, nausea and light-headedness are common and should improve in 2-5 days. Drinking fluids may help. If nausea persists, medicine can be prescribed by calling your doctor at [phone]. Leaving the Outpatient Surgery Center:     As you leave the surgery center you might be given a CPM (continuous passive motion) machine and/or a Cold Therapy unit (Cryocuff or Game Ready). The CPM may be delivered to your house as well.    The CPM is used to help maintain your motion. YOU MAY START THE CPM THE DAY AFTER YOUR SURGERY. You may remove your brace to use your CPM machine. If you are able to, sleep with the machine on. If you are not able to sleep with the CPM, sleep with the immobilizer and use the CPM machine multiple times throughout the day (4-6 hours per day). Each day you may increase the flexion setting as you tolerate. You do not have to go farther than 90 degrees of flexion if you eventually reach that. You may start the CPM on the day of your surgery or the following day. You may notice your bandage is a little more saturated once you begin the motion. For Cold Therapy, always have a layer in between your skin and the wrap. The cold is to be used to help control pain and swelling. Follow the instructions given to you on operating the machine. You may take the ice wrap off when you are not icing. Dont use the cold therapy while using the CPM.    For the first week:   1. When lying in bed keep your knee higher than your heart to help with swelling. 2. Use crutches when out of bed. 3. When walking, you may touch your foot to the ground for balance as you feel comfortable. 4. The cryotherapy cold sleeve, will be put on in recovery to control swelling. The position of the cryocuff is critical. Make sure the cuff is empty when you tighten it down and then fill it. Make the top strap snug and the bottom strap looser. Re-chill the water once an hour or as needed. Take the cold therapy wrap off when going outside the home. 5. Wear immobilizer to sleep at night and when you are up and about. You may take the immobilizer off at home to work on knee and ankle motion. Care of your Incisions  1. The incision is often checked 6 to 10 days after surgery. 2. Moderate bleeding may occur at the incision sites. This should decrease quickly over time. 3. Leave the dressings from surgery in place for 3-4 days.  The bulky dressings may be removed and replaced with fresh gauze at that time, but leave on the small tape strips on the incision sites. Watch the wound for increasing redness, tenderness, swelling, and pus drainage daily. These can be early signs of infection. If you notice any of these signs of infection please call at [phone]. A mild fever during the first few days after surgery is not uncommon. This often occurs and can be treated with deep breathing, coughing to clear the lungs, and walking with crutches. However, fevers, increasing pain, and swelling at the incisions should be reported immediately. Showering:   Until your first post operative visit, you should consider wrapping your knee in saran wrap with tape for showering.  A plastic chair in your shower will allow you to sit.  Sponge bathing is also an option.  In general, once cleared by your physician you may allow your incisions to get wet in the shower. Post-Operative Pain Management  ANESTHESIA: You will meet with an anesthesiologist on the day of surgery to discuss your anesthesia. You will have general anesthesia and often will have a femoral nerve block. This will wear off so be ready to begin you pain medications in order to prevent a long period without pain control. MEDICATIONS: You will be given a prescription for medications. Please take them as directed on the label and with food.  Certain pain medications may contain Tylenol(Acetaminophen). It is important not to take any additional Tylenol while on these pain medications.  Do not mix your pain medications with alcohol.  You should not drive while taking pain medications as they increase your liability and delay your responses.  Your physician will most likely prescribe to you Aspirin 325 mg (ECASA) daily for three weeks after surgery. This is done in order to help minimize the risk for a blood clot from developing, which is a possible complication after any surgery.    You will go home with white stockings on your legs called EDUAR hose. They are used to help with swelling and blood clots. After your first visit they will usually be discontinued.  If you have any questions or concerns regarding your medications, please call the office. · Common side effects of the narcotics include nausea, vomiting, drowsiness, constipation, and difficulty urinating. If you experience constipation, drink lots of water/Gatorade, avoid soda and diet drinks. Eat plenty of fiber. You may take a stool softener: Colace 100mg twice a day for the first week. For SEVERE constipation use magnesium citrate, one 8 oz bottle. All can be bought at the pharmacy. Diet and General Conditioning   Aerobic conditioning and diet are both very important after surgery. In general, we recommend that you make sure to avoid skipping meals, eat a balanced diet including regular portions of fruits and vegetables, and avoid relying on fast foods while you are recovering from surgery. Also, consider taking a daily multi-vitamin. Participate in some form of aerobic conditioning after surgery. Speak with your physical therapist or call our office to determine an appropriate form of exercise after surgery. Initially, your exercise will need to be modified after surgery. Follow-up Visits   Doctor  Plan on seeing your surgeon at 1 week, 1 month, 3 months, and 6 months after surgery. If your knee does not progress as planned, you are welcome to schedule additional visits. There is usually no charge for surgery related visits 90 days following surgery. You will receive a bill for any x-rays or special equipment (such as a brace). Physical Therapy   Your Therapist may schedule more visits depending on your progress.  First visit 2-5 days after surgery.  Weekly visits from week 1 through week 6.  Then every other week for one month.  Monthly from 3 months through 6 months.     Physical Therapy  Goals for the first week  1. *Maintain maximum extension (straightening). 2. * Minimize or eliminate swelling. 3. Activate the thigh muscle. 4. Achieve greater than 90 degrees of bending. 5. Promote incision healing. Home Exercises Begin performing these exercises within the first 24 hours following surgery. 1. Throughout the day, take off the immobilizer and ice machine. Prop your foot up on pillows for 10 minutes so that your knee is not supported. Allow the knee to straighten fully. Do quad sets periodically by tightening the muscle on top of the thigh so that your knee cap moves toward you. Hold for five seconds and relax. 2. Perform heel slides multiple times throughout the day. Take the brace and ice off and slide your heel toward your bottom within a comfortable range of motion; help the leg with your hands. 3. Perform straight leg lifts in the knee immobilizer a couple times a day. Start by doing a quad set (above). Then lift your entire leg off the table starting with the heel. The knee should not bend. If it does, you should not perform this exercise. 4. Perform ankle pumps by moving foot up and down. Do these throughout the day. When to stop using  Immobilizer and crutches: Your Physical Therapist will help you to determine the appropriate time to wean out of your brace and off of crutches. Use these guidelines to help.  Gradually wean off the crutches after the first 2-3 weeks. You may place as much weight on your leg in the brace with the crutches as you feel comfortable with. Begin with 2 crutches all the time, then one crutch at home and 2 outside, no crutches at home and one outside, no crutches.  When you can straighten your leg fully and do a straight leg lift you can wean off the immobilizer for day use, still sleep in it.  When your knee is straight for 3 weeks then you can wean out of the immobilizer when you sleep. Your knee must remain straight.     Cold Therapy: You can discontinue using the cryocuff or game ready as the pain decreases. You may use cold therapy for control of pain and swelling. Use for 20-30 minutes at a time throughout the day as you desire. Issues after Surgery   Clicks and Pops- it is common for patients to experience sensations of clicks and pops in the first few months after surgery. It will resolve with time.  Pain around or just below the knee cap is common after surgery. It will resolve as your quad muscle strengthens with physical therapy. If you call the office please have us paged instead of leaving a voice mail so that we can immediately take care of your needs.    Phone [phone]              Fax [phone]

## 2021-03-31 NOTE — ANESTHESIA PREPROCEDURE EVALUATION
Relevant Problems   No relevant active problems       Anesthetic History   No history of anesthetic complications            Review of Systems / Medical History  Patient summary reviewed and pertinent labs reviewed    Pulmonary  Within defined limits                 Neuro/Psych   Within defined limits           Cardiovascular  Within defined limits                Exercise tolerance: >4 METS     GI/Hepatic/Renal  Within defined limits              Endo/Other  Within defined limits           Other Findings              Physical Exam    Airway  Mallampati: I  TM Distance: > 6 cm  Neck ROM: normal range of motion   Mouth opening: Normal     Cardiovascular  Regular rate and rhythm,  S1 and S2 normal,  no murmur, click, rub, or gallop             Dental  No notable dental hx       Pulmonary  Breath sounds clear to auscultation               Abdominal  GI exam deferred       Other Findings            Anesthetic Plan    ASA: 1  Anesthesia type: general      Post-op pain plan if not by surgeon: peripheral nerve block single    Induction: Intravenous  Anesthetic plan and risks discussed with: Patient

## 2021-03-31 NOTE — ANESTHESIA PROCEDURE NOTES
Peripheral Block    Start time: 3/31/2021 8:18 AM  End time: 3/31/2021 8:19 AM  Performed by: Hank Ny MD  Authorized by: Hank Ny MD       Pre-procedure: Indications: at surgeon's request and post-op pain management    Preanesthetic Checklist: patient identified, risks and benefits discussed, site marked, timeout performed, anesthesia consent given and patient being monitored    Timeout Time: 08:17          Block Type:   Block Type: Adductor canal  Laterality:  Left  Monitoring:  Standard ASA monitoring, continuous pulse ox, frequent vital sign checks, heart rate, responsive to questions and oxygen  Injection Technique:  Single shot  Procedures: ultrasound guided    Patient Position: supine  Prep: chlorhexidine    Location:  Mid thigh  Needle Type:  Stimuplex  Needle Gauge:  22 G  Needle Localization:  Ultrasound guidance  Medication Injected:  Ropivacaine 0.375% with epi 1:200,000 in NS injection, 20 mL (Mixture components: ropivacaine (PF) 5 mg/mL (0.5 %) Soln, . 75 mL; EPINEPHrine HCl (PF) 1 mg/mL (1 mL) Soln, . 005 mL; 0.9% sodium chloride Soln, .245 mL)  dexamethasone (DECADRON) 4 mg/mL injection, 4 mg  Med Admin Time: 3/31/2021 8:19 AM    Assessment:  Number of attempts:  1  Injection Assessment:  Incremental injection every 5 mL, local visualized surrounding nerve on ultrasound, negative aspiration for CSF, negative aspiration for blood, no paresthesia, no intravascular symptoms and ultrasound image on chart  Patient tolerance:  Patient tolerated the procedure well with no immediate complications

## 2021-03-31 NOTE — H&P
Outpatient Surgery History and Physical:  Hugo Roldan was seen and examined. CHIEF COMPLAINT:   Left knee pain. PE:     Visit Vitals  LMP 03/19/2021       Heart:   Regular rhythm, regular pulses. Lungs:  Are clear, non-labored respirations. Past Medical History:    Patient Active Problem List    Diagnosis    Left anterior cruciate ligament tear    Left knee pain    ACL (anterior cruciate ligament) rupture    Acute medial meniscus tear, left, initial encounter    Chronic idiopathic constipation    Family history of colon cancer    Anaphylaxis     Last Assessment & Plan:   Does have EpiPen and reviewed when/how to use it.  Angio-edema     Last Assessment & Plan:   Sending numerous labs - looking at auto-immune, celiac, alpha-gal, HAE causes - if flare occurs - take Prednisone 20 mg twice daily x 3 days (provided) and can add on Benadryl 25-50 mg.  Journal any associated factors - foods, stressors, menses, alcohol, etc to identify pattern.  Chronic urticaria     Last Assessment & Plan:   Possible cause of her symptoms - will send a few screening labs and also would have her take Claritin (instead of Cetirizine as less sedating) 10 mg daily - can increase to twice a day when swelling occurs.  Periorbital dermatitis     Last Assessment & Plan: Will review notes from [facility] - likely to need information and plan for smart practice and facial dermatitis panel in 1 month or so.  Poisoning by bee sting     Last Assessment & Plan:   Plan for RAST to flying insects - may in fact need venom immunotherapy.  Neck pain, chronic     Last Assessment & Plan:   38year old woman who has a long history of neck pain. She has pain in her right rj cervical region. She's had some recent numbness in her right hand. She's had physical therapy, extensively, without benefit. She also sees a chiropractor. Her MRI shows bulging at C5-6, but it's not severe.  This bulge is relatively mild and doesn't explain her pain. She doesn't have any significant radicular pain. I would not recommend anything surgical. Given the duration of her symptoms, I would recommend trigger point injections. I don't think that she needs any epidurals since I don't feel that her pain is discogenic. Surgical History:   Past Surgical History:   Procedure Laterality Date    HX GYN       x 1    HX GYN      laparoscopic    HX HEENT      sinus surgery    CO BREAST SURGERY PROCEDURE UNLISTED  2019    breast reduction       Social History: Patient  reports that she has never smoked. She has never used smokeless tobacco. She reports current alcohol use. She reports previous drug use. Family History:   Family History   Problem Relation Age of Onset    No Known Problems Mother    [facility] Cancer Father         prostate       Allergies: Reviewed per EMR  Allergies   Allergen Reactions    Sulfa (Sulfonamide Antibiotics) Anaphylaxis    Adhesive Rash       Medications:    No current facility-administered medications on file prior to encounter. Current Outpatient Medications on File Prior to Encounter   Medication Sig    linaCLOtide (LINZESS) 72 mcg cap capsule Take 72 mcg by mouth daily as needed.  zolpidem CR (AMBIEN CR) 12.5 mg tablet Take 12.5 mg by mouth nightly. The surgery is planned for the LEFT KNEE ARTHROSCOPY WITH ANTERIOR CRUCIATE LIGAMENT RECONSTRUCTION ALLOGRAFT / MEDIAL LATERAL MENISCUS REPAIR VS PARTIAL MENISCECTOMY        History and physical has been reviewed. The patient has been examined. There have been no significant clinical changes since the completion of the originally dated History and Physical.  Patient identified by surgeon; surgical site was confirmed by patient and surgeon. The patient is here today for outpatient surgery. I have examined the patient, no changes are noted in the patient's medical status.  Necessity for the procedure/care is still present and the history and physical above is current. See the office notes for the full long term history of the problem. Please see the recent office notes for the musculoskeletal examination. We have already discussed the clinical implications of both conservative and operative treatments. They would like to proceed with operative treatment. I talked with them extensively about the risks, benefits, reasonable expectations and expected recovery time including long term need for ambulatory assistance as well as possible complications including but not limited to bleeding, infection, need for hardware removal or exchange, neurovascular injury, stiffness, pain, dislocation, continued problems, DVT, PE, hardware failure, other fracture, need for further surgery, heterotopic ossification, MI and other anesthesia related risks, etc. They have exhausted all other options and wish to proceed. The patient was counseled at length about the risks of tova Covid-19 during their perioperative period and any recovery window from their procedure. The patient was made aware that tova Covid-19  may worsen their prognosis for recovering from their procedure and lend to a higher morbidity and/or mortality risk. All material risks, benefits, and reasonable alternatives including postponing the procedure were discussed. The patient does  wish to proceed with the procedure at this time.       Signed By: Stanton De La Rosa MD     March 31, 2021 6:59 AM

## 2021-03-31 NOTE — OP NOTES
Operative Note    Date of Surgery: 3/31/2021      Preoperative diagnosis:  Acute medial meniscus tear of left knee, initial encounter [S83.242A]  Rupture of anterior cruciate ligament of left knee, initial encounter [S83.512A]    Postoperative diagnosis: Rupture of anterior cruciate ligament of left knee, initial encounter [S83.512A]  Acute medial meniscus tear of left knee, initial encounter [X97.320P]    Surgeon(s) and Role:     Fam Mcguire MD - Primary     Assistant: none     Anesthesia: General.  regional.    Antibiotics: Ancef 2 grams IV    Tourniquet Time:   Total Tourniquet Time Documented:  Thigh (Left) - 76 minutes  Total: Thigh (Left) - 76 minutes         Procedures:  Procedure(s):  LEFT KNEE ARTHROSCOPY WITH ANTERIOR CRUCIATE LIGAMENT RECONSTRUCTION ALLOGRAFT / MEDIAL LATERAL [address]    [id number] meniscus repair     Findings:  1. EUA -   + lachman's and positive pivot shift. Stable to varus and valgus. 2. PFJ - Normal  3. Medial Joint -there was a posterior horn vertical tear near the red-white and red red zone. It was unstable to probing. The rest of the meniscus and cartilage appeared fairly normal.  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 4. Lateral joint - normal appearing meniscus, stable to probing, cartilage appeared normal on the femur with mild grade 1 change on the tibia  5. PCL - stable and intact  6. ACL - acute tear of acl     Indications / Consent: This is a patient who feels unstable after an ACL tear and injury. After previous discussions and treatments using both conservative and/or non-operative treatment options the patient elected to proceed with surgery due to continued symptoms. A review of the risks and benefits, including but not limited to infection, stiffness, injury to nerves and vessels, DVT, PE, MI, need for further operations and other anesthesia related risks was performed with the patient.  After this review and the review of the likely outcome and potential complications of the procedure, preoperative verbal and written consents were obtained. The operative procedure and postoperative course were discussed with the patient in detail and the extremity was marked by the patient and myself. Procedure: The patient was given their anesthetic and placed in the supine position. An EUA was performed and positive for ACL instability as noted above. A lateral post was placed next to the operative leg and the nonoperative leg was well padded and lay secured on the table. A non-sterile tourniquet was applied to the operative leg. The leg was prepped with ChloraPrep and draped in the usual fashion. Prior to the beginning of the procedure, a time-out was performed for correct surgical site identification as was marked during the pre-operative meeting. This was confirmed using the written consent and history/physical. Time-out for antibiotic dosing, timing and selection was also performed. The tourniquet was inflated to 250 mmhg. Using an 11 blade scalpel the scope was introduced through an anterolateral portal and the anteromedial portal was developed using spinal needle localization. The patellofemoral joint was viewed and felt to be unremarkable. The medial and lateral gutters were clear. The notch was then visualized and the ACL was noted to be torn. A probe was used to confirm this with a positive lateral wall sign seen. An allograft was prepared on the back table. The graft was sized and felt to be appropriate at 9.5 mm in diameter at the femoral end and about a 9.5 mm graft at the tibia. The graft was placed in a graft tube under 20 lbs. of tension on the back table. Then attention was turned back into the knee. The scope was reinserted. The lateral compartment was viewed. The articular surface was normal on the femur and abnormal with grade I chondromalacia on the tibia.      The medial compartment was viewed, the articular surface was normal and  the medial meniscus was probed and noted to be torn from the posterior horn in a vertical fashion in an avascular area. The medial meniscus appeared to have a vertical type tear. Meniscal rasp was then used to debride and freshen the tear site. A [employer] air meniscal repair device was used to place sutures across the tear site. A total of 3 sutures were placed to on the superior surface one on the inferior and vertical mattress and horizontal mattress configurations. The posteromedial and lateral compartments were viewed and otherwise normal.  No ramp lesions were noted. A minimal notchplasty was performed. The ACL femoral and tibial footprints were cleared as much as needed. Placing the camera in the medial portal the flip cutter device guide was then placed over the anatomic side of the ACL footprint on the femur. An incision was made over the lateral distal thigh. The guide was set in place. The bone tunnel length was measured. The flip cutter was then advanced. The appropriate sized reamer was then selected on the flip cutter device. It was back drilled approximately 20 mm. Bone debris was then cleared. A suture shuttle was then inserted. Attention was then turned to the tibial ACL attachment site. A tibial guide was then placed. The scope was switched to the lateral portal.  Using a similar technique the flip cutter device was then inserted into the center portion of the anatomic footprint of the tibia. The reamer was then set and backed drilled approximately 25mm. A suture shuttle was then inserted. The graft was then obtained and removed from the graft tube. The button sutures were attached to this passing suture. This was then pulled through the tibia and femur. The button was pulled up into position under direct visualization into the femur and it was rotated. The graft was advanced and then the tibial loop was passed.   An ABS button was attached to that side and the tibial graft component was brought into the tunnel. Once the graft was appropriately and the tibia of the femoral side was then further advanced so that appropriate amount of tendon was within the bone tunnels. This is been preoperatively marked on the graft. The knee was placed through a range of motion of at least 15 cycles. After the graft had been checked arthroscopically noting no impingement. In full extension the graft was tightened again on both the femur and the tibial side. The graft was checked and had appropriate position and tension. The Lachman's was stable and there was no longer a pivot shift noted. A drill was then used on the tibia and the tap was placed. A swivel lock anchor was placed with the ABS and other sutures into it. Backup fixation was then provided. The tourniquet was let down. The pes was closed with 0 monocryl suture, subcutaneous tissue was closed with 2-0 Monocryl, skin was closed with zip loop. The portals were closed with interrupted monofilament suture and steri-strips; sterile honeycomb dressings, EDUAR hose, and knee immobilizer were placed on the knee. The patient was returned to the recovery room in satisfactory condition. There were no known intraoperative complications. All counts were correct. Post-operative plan:Post operative instructions are provided. Patient will be TDWB on the operative leg until otherwise instructed by PT or MD. They will start PT within a couple of days working on an ACL protocol. I will talk with the family and contact them on POD # 1. Given the meniscal repair on the medial side she can weight-bear in extension with the brace locked. She can do this up until 6 weeks at which point the brace can be unlocked and she allowed to weight-bear through flexion as well. Estimated Blood Loss:   Minimal 5-10 mm    Fluids:    see anesthesia records. Implant:   Implant Name Type Inv.  Item Serial No.  Lot No. LRB No. Used Action AIR- + All inside meniscal Repair system    JEANNINE ÁNGEL [id number] Left 1 Implanted   AIR- + All inside meniscal Repair system    JEANNINE ÁNGEL [id number] Left 2 Implanted   RTI Anterior Tibialis Tendon   [id number] RTI [facility]_[place] [id number] Left 1 Implanted   PASSER SUT ACL RECON DBL LD TIGHTROPE - [id number]  PASSER SUT ACL RECON DBL LD TIGHTROPE  [employer] [id number] Left 1 Implanted   SYS TIGHTROPE ABS W UHMWPE --  - [id number]  SYS TIGHTROPE ABS W UHMWPE --   [employer] [id number] Left 1 Implanted   BUTTON FIX [id number] TI ATTCH SYS ALLGRFT CONSTRUCT FOR - [id number]  BUTTON FIX [id number] TI ATTCH SYS ALLGRFT CONSTRUCT FOR  [employer] [id number] Left 1 Implanted   KIT INT FIX W4.75XL19.1MM 2ND W/ [facility] FOR ACL/PCL REP - [id number]  KIT INT FIX W4.75XL19.1MM 2ND W/ SWIVELOCK FOR ACL/PCL REP  [employer] [id number] Left 1 Implanted       Closure: Primary    Complications: None    Signed By: Dewayne Brewer MD

## 2021-03-31 NOTE — ANESTHESIA PROCEDURE NOTES
Peripheral Block    Start time: 3/31/2021 8:12 AM  End time: 3/31/2021 8:16 AM  Performed by: Luba Ellis MD  Authorized by: Luba Ellis MD       Pre-procedure: Indications: at surgeon's request and post-op pain management    Preanesthetic Checklist: patient identified, risks and benefits discussed, site marked, timeout performed, anesthesia consent given and patient being monitored    Timeout Time: 08:10          Block Type:   Block Type:  Sciatic single shot  Laterality:  Left  Monitoring:  Standard ASA monitoring, continuous pulse ox, frequent vital sign checks, heart rate, oxygen and responsive to questions  Injection Technique:  Single shot  Procedures: ultrasound guided and nerve stimulator    Patient Position: supine  Prep: chlorhexidine    Location:  Upper thigh  Needle Type:  Stimuplex  Needle Gauge:  22 G  Needle Localization:  Anatomical landmarks, nerve stimulator and ultrasound guidance  Motor Response comment:   Motor Response: minimal motor response >0.4 mA   Medication Injected:  Ropivacaine 0.375% with epi 1:200,000 in NS injection, 30 mL (Mixture components: ropivacaine (PF) 5 mg/mL (0.5 %) Soln, . 75 mL; EPINEPHrine HCl (PF) 1 mg/mL (1 mL) Soln, . 005 mL; 0.9% sodium chloride Soln, .245 mL)  dexamethasone (DECADRON) 4 mg/mL injection, 4 mg  Med Admin Time: 3/31/2021 8:16 AM    Assessment:  Number of attempts:  1  Injection Assessment:  Incremental injection every 5 mL, local visualized surrounding nerve on ultrasound, negative aspiration for blood, no paresthesia, no intravascular symptoms and ultrasound image on chart  Patient tolerance:  Patient tolerated the procedure well with no immediate complications

## 2021-03-31 NOTE — ANESTHESIA POSTPROCEDURE EVALUATION
Procedure(s):  LEFT KNEE ARTHROSCOPY WITH ANTERIOR CRUCIATE LIGAMENT RECONSTRUCTION ALLOGRAFT / MEDIAL LATERAL MENISCUS REPAIR. general    Anesthesia Post Evaluation      Multimodal analgesia: multimodal analgesia used between 6 hours prior to anesthesia start to PACU discharge  Patient location during evaluation: PACU  Patient participation: complete - patient participated  Level of consciousness: awake  Pain management: adequate  Airway patency: patent  Anesthetic complications: no  Cardiovascular status: acceptable  Respiratory status: spontaneous ventilation and acceptable  Hydration status: acceptable  Post anesthesia nausea and vomiting:  none      INITIAL Post-op Vital signs:   Vitals Value Taken Time   /69 03/31/21 1123   Temp 37.1 °C (98.7 °F) 03/31/21 1020   Pulse 75 03/31/21 1125   Resp 16 03/31/21 1043   SpO2 97 % 03/31/21 1125   Vitals shown include unvalidated device data.

## 2021-03-31 NOTE — BRIEF OP NOTE
Brief Postoperative Note    Patient: Greg James  YOB: 1980  MRN: [id number]    Date of Procedure: 3/31/2021     Pre-Op Diagnosis: Acute medial meniscus tear of left knee, initial encounter [S83.242A]  Rupture of anterior cruciate ligament of left knee, initial encounter [S83.512A]    Post-Op Diagnosis: Same as preoperative diagnosis. Procedure(s):  LEFT KNEE ARTHROSCOPY WITH ANTERIOR CRUCIATE LIGAMENT RECONSTRUCTION ALLOGRAFT / MEDIAL LATERAL MENISCUS REPAIR    Surgeon(s):  Surya العراقي MD    Surgical Assistant: None    Anesthesia: General     Estimated Blood Loss (mL): Minimal    Complications: None    Specimens: * No specimens in log *     Implants:   Implant Name Type Inv.  Item Serial No.  Lot No. LRB No. Used Action   AIR- + All inside meniscal Repair system    [employer] [id number] Left 1 Implanted   AIR- + All inside meniscal Repair system    [employer] [id number] Left 2 Implanted   RTI Anterior Tibialis Tendon   [id number] [employer] [id number] Left 1 Implanted   PASSER SUT ACL RECON DBL LD TIGHTROPE - [id number]  PASSER SUT ACL RECON DBL LD TIGHTROPE  [employer] [id number] Left 1 Implanted   SYS TIGHTROPE ABS W UHMWPE --  - [id number]  SYS TIGHTROPE ABS W UHMWPE --   [employer] [id number] Left 1 Implanted   BUTTON FIX [id number] TI ATTCH SYS ALLGRFT CONSTRUCT FOR - [id number]  BUTTON FIX [id number] TI ATTCH SYS ALLGRFT CONSTRUCT FOR  [employer] [id number] Left 1 Implanted   KIT INT FIX W4.75XL19.1MM 2ND W/ SWIVELOCK FOR ACL/PCL REP - [id number]  KIT INT FIX W4.75XL19.1MM 2ND W/ SWIVELOCK FOR ACL/PCL REP  [employer] [id number] Left 1 Implanted       Drains: * No LDAs found *    Findings: see op note    Electronically Signed by Beatris Ashby MD on 3/31/2021 at 10:07 AM

## 2021-04-05 ENCOUNTER — HOSPITAL ENCOUNTER (OUTPATIENT)
Dept: PHYSICAL THERAPY | Age: 41
Discharge: HOME | End: 2021-04-05
Payer: COMMERCIAL

## 2021-04-05 DIAGNOSIS — S83.512A RUPTURE OF ANTERIOR CRUCIATE LIGAMENT OF LEFT KNEE, INITIAL ENCOUNTER: ICD-10-CM

## 2021-04-05 DIAGNOSIS — S83.242A ACUTE MEDIAL MENISCUS TEAR, LEFT, INITIAL ENCOUNTER: ICD-10-CM

## 2021-04-05 PROCEDURE — 97110 THERAPEUTIC EXERCISES: CPT

## 2021-04-05 PROCEDURE — 97140 MANUAL THERAPY 1/> REGIONS: CPT

## 2021-04-05 PROCEDURE — 97162 PT EVAL MOD COMPLEX 30 MIN: CPT

## 2021-04-05 NOTE — PROGRESS NOTES
[facility]  : 1980  Primary: Ever Nam Ppo  Secondary:  [address] at [address]  [phone]   [phone]        OUTPATIENT PHYSICAL THERAPY: Daily Treatment Note 2021  Visit Count:  Visit count could not be calculated. Make sure you are using a visit which is associated with an episode. ICD-10: Treatment Diagnosis: Pain in Joint, L knee [M25.562]; Difficulty Walking, Not Elsewhere Classified [R26.2]; Muscle Wasting and Atrophy, Not Elsewhere Classified, L thigh [M62.552]  Precautions/Allergies:   Sulfa (sulfonamide antibiotics) and Adhesive   TREATMENT PLAN:  Effective Dates: 2021 TO 2021 (90 days). Frequency/Duration: 2 times a week for 90 Day(s) MEDICAL/REFERRING DIAGNOSIS:  Rupture of anterior cruciate ligament of left knee, initial encounter [S83.512A]  Acute medial meniscus tear, left, initial encounter [S83.242A]   DATE OF ONSET: 3/31/21  REFERRING PHYSICIAN: Earl Nguyen MD MD Orders: Evaluation and Treat, Per Protocol; 2 x a week for 12 weeks  Return MD Appointment: 21       Pre-treatment Symptoms/Complaints:  Pt. Reports knee pain and difficulty walking  Pain: Initial: Pain Intensity 1: 8 /10 Post Session:  8/10   Medications Last Reviewed:  2021  Updated Objective Findings:  See evaluation note from today  TREATMENT:     Therapeutic Exercise: (15 Minutes):  Exercises per grid below to improve mobility, strength, balance and coordination. Required moderate visual, verbal and manual cues to promote proper body alignment, promote proper body posture and promote proper body mechanics. Progressed resistance, range and repetitions as indicated.      Date:  2021   Activity/Exercise Parameters   Knee extension ROM  5 minutes   Quad setting 5 minutes   Knee flexion ROM 3 minutes   Crutch training 2 minutes               Manual Therapy (    Soft Tissue Mobilization Duration  Duration: 10 Minutes): Manual techniques to facilitate improved motion and decreased pain. (Used abbreviations: MET - muscle energy technique; PNF - proprioceptive neuromuscular facilitation; NMR - neuromuscular re-education; a/p - anterior to posterior; p/a - posterior to anterior)   · Joint mobilization: L patellofemoral all directions grade I-II      MedBridge Portal  Treatment/Session Summary:    · Response to Treatment:  Pt. tolerates initial ROM exercises with mild anterior knee pain and minimal post treatment soreness. Pt. has some difficulty with TDWB gait training. .  · Communication/Consultation:  None today  · Equipment provided today:  None today  · Recommendations/Intent for next treatment session: Next visit will focus on ROM and pain relief.     Total Treatment Billable Duration:  35 minutes evaluation, 15 minutes therapeutic exercise, 10 minutes manual therapy  PT Patient Time In/Time Out  Time In: 1030  Time Out: Melody Horton PT    Future Appointments   Date Time Provider Melody Puentes   4/8/2021 10:30 AM Geraldo Elliott PT [facility]   4/13/2021  8:45 AM Jenna Hugo MD [facility]   4/13/2021 11:30 AM Fransisco Hauser [facility]   4/15/2021 11:30 AM Fransisco Hauser [facility]   4/20/2021  3:30 PM [facility]   4/22/2021  3:30 PM Geraldo Elliott PT [facility]

## 2021-04-05 NOTE — THERAPY EVALUATION
Dianna Chester  : 1980  Primary: Matt Rausch [employer] Ppo  Secondary:  [address] at [address]  [phone]   [phone]        OUTPATIENT PHYSICAL THERAPY:Initial Assessment 2021   ICD-10: Treatment Diagnosis: Pain in Joint, L knee [M25.562]; Difficulty Walking, Not Elsewhere Classified [R26.2]; Muscle Wasting and Atrophy, Not Elsewhere Classified, L thigh [M62.552]  Precautions/Allergies:   Sulfa (sulfonamide antibiotics) and Adhesive   TREATMENT PLAN:  Effective Dates: 2021 TO 2021 (90 days). Frequency/Duration: 2 times a week for 90 Day(s) MEDICAL/REFERRING DIAGNOSIS:  Rupture of anterior cruciate ligament of left knee, initial encounter [S83.512A]  Acute medial meniscus tear, left, initial encounter [V96.396I]   DATE OF ONSET: 3/31/21  REFERRING PHYSICIAN: Gita Whitehead MD MD Orders: Evaluation and Treat, Per Protocol; 2 x a week for 12 weeks  Return MD Appointment: 21     INITIAL ASSESSMENT:  Ms. Chester presents s/p L ACL repair with allograft and L medial meniscus repair on 3/31/21 to address instability following a L ACL tear. Pt. Attends PT ambulating with 2 crutches and TDWB. Assessment of incision reveals normal signs of healing at this time with moderate swelling present around the L knee. Pt. Demonstrates ROM limitations for L knee flexion and extension at this time. Neuromuscular control of the L quadriceps is diminished and strength deficits are present post surgery. Pt. Will benefit from skilled PT to address impairments identified through evaluation and return to previous level of function    PROBLEM LIST (Impacting functional limitations):  1. Decreased Strength  2. Decreased ADL/Functional Activities  3. Decreased Balance  4. Increased Pain  5. Decreased Activity Tolerance  6. Decreased Flexibility/Joint Mobility  7.  Decreased Simpson with Home Exercise Program INTERVENTIONS PLANNED: (Treatment may consist of any combination of the following)  1. Balance Exercise  2. Cryotherapy  3. Electrical Stimulation  4. Gait Training  5. Home Exercise Program (HEP)  6. Manual Therapy  7. Neuromuscular Re-education/Strengthening  8. Range of Motion (ROM)  9. Therapeutic Activites  10. Therapeutic Exercise/Strengthening     GOALS: (Goals have been discussed and agreed upon with patient.)  Discharge Goals: Time Frame: 4 months  1. Pt. Will demonstrate < 2/10 L knee pain to improve symptomatic complaints. 2. Pt. Will demonstrate >1  Degree of L knee extension to improve ROM. 3. Pt. Will demonstrate > 125 degrees of L knee flexion to improve ROM. 4. Pt. Will ambulate for >10 minutes without crutches to improve ambulation  5. Pt. Will demonstrate 5 single LE squats on the L LE to demonstrate improved strength  6. Pt. Will be independent with HEP to continue LE strengthening  7. Pt. Will score > 60 on the LEFS to demonstrate improved pain and function. OUTCOME MEASURE:   Tool Used: Lower Extremity Functional Scale (LEFS)  Score:  Initial: 18/80 Most Recent: X/80 (Date: -- )   Interpretation of Score: 20 questions each scored on a 5 point scale with 0 representing \"extreme difficulty or unable to perform\" and 4 representing \"no difficulty\". The lower the score, the greater the functional disability. 80/80 represents no disability. Minimal detectable change is 9 points. MEDICAL NECESSITY:   · Patient is expected to demonstrate progress in strength, range of motion and balance to increase independence with ADL's and recreational activities. .  REASON FOR SERVICES/OTHER COMMENTS:  · Patient will benefit from skilled PT to address impairments identified through evaluation and return to previous ADL and recreational capacity.    Total Duration:  PT Patient Time In/Time Out  Time In: 1030  Time Out: 1130    Rehabilitation Potential For Stated Goals: Good  Regarding Hugo Roldan's therapy, I certify that the treatment plan above will be carried out by a therapist or under their direction. Thank you for this referral,  Margarita Salazar, PT     Referring Physician Signature: Justo Vila MD No Signature is Required for this note. PAIN/SUBJECTIVE:   Initial: Pain Intensity 1: 8 /10 Post Session:  8/10   HISTORY:   History of Injury/Illness (Reason for Referral):  Pt. Attends PT s/p L ACL repair using allograft and medial meniscus repair on 3/31/21. Pt. Notes ACL tear happened while playing tennis last month. Pt. Elected to have surgery secondary to instability in the L knee. Pt. Notes pain has been manageable thus far with rest and ice. Pt. Reports difficulty with ambulation secondary to crutch use and moderate limitations with ADL's. Pt. is a  and drives locally. Pt. Would like to improve ADL performance, work performance, and return to recreational activities including running and possible tennis. Past Medical History/Comorbidities:   Ms. Pradeep Braun  has a past medical history of IBS (irritable bowel syndrome) and Insomnia. Ms. Pradeep Braun  has a past surgical history that includes pr breast surgery procedure unlisted (2019); hx gyn; hx gyn; and hx heent (2011). Social History/Living Environment:     Lives in two story home with family  Prior Level of Function/Work/Activity:  Functioned independently without limitations     Ambulatory/Rehab Services H2 Model Falls Risk Assessment   Risk Factors:       No Risk Factors Identified Ability to Rise from Chair:       (0)  Ability to rise in a single movement   Falls Prevention Plan:       No modifications necessary   Total: (5 or greater = High Risk): 0   ©2010 [facility] [address] [place] [address] Patent #[id number].  Federal Law prohibits the replication, distribution or use without written permission from [facility] [employer]   Current Medications:       Current Outpatient Medications:     aspirin (ASPIRIN) 325 mg tablet, Take 1 Tab by mouth daily for 7 days. To start after surgery, Disp: 7 Tab, Rfl: 0    senna-docusate (PERICOLACE) 8.6-50 mg per tablet, Take 1 Tab by mouth daily. To start after surgery  Indications: constipation, Disp: 21 Tab, Rfl: 0    ondansetron (ZOFRAN ODT) 4 mg disintegrating tablet, 1 Tab by SubLINGual route every six (6) hours as needed for Nausea or Nausea or Vomiting. To start after surgery  Indications: prevent nausea and vomiting after surgery, Disp: 20 Tab, Rfl: 0    DISABLED PLACARD (DISABLED PLACARD) DMV, Take to DMV prior to surgery, Disp: 1 Each, Rfl: 0    linaCLOtide (LINZESS) 72 mcg cap capsule, Take 72 mcg by mouth daily as needed. , Disp: , Rfl:     zolpidem CR (AMBIEN CR) 12.5 mg tablet, Take 12.5 mg by mouth nightly., Disp: , Rfl:    Date Last Reviewed:  4/5/2021   Number of Personal Factors/Comorbidities that affect the Plan of Care: 0: LOW COMPLEXITY   EXAMINATION:   Observation:       Gait Observation: Pt. Ambulates with TDWB and two crutches    Range of Motion:                                  Right                                        Left  Knee Extension 2 degrees (-6) degrees   Knee Flexion 129 degrees 45 degrees   Ankle Dorsiflexion -- --     Strength:                                                Right                                        Left  Hip Extension Not tested today Not tested today   Hip Abduction Not tested Not tested   Knee Extension 5/5 3/5   Knee Flexion 5/5 3/5   Functional Squat              Body Structures Involved:  1. Bones  2. Joints  3. Muscles  4. Ligaments Body Functions Affected:  1. Sensory/Pain  2. Neuromusculoskeletal  3. Movement Related Activities and Participation Affected:  1. Mobility  2. Self Care  3.  Community, Social and [place]   Number of elements (examined above) that affect the Plan of Care: 3: MODERATE COMPLEXITY   CLINICAL PRESENTATION:   Presentation: Evolving clinical presentation with changing clinical characteristics: MODERATE COMPLEXITY   CLINICAL DECISION MAKING:   Use of outcome tool(s) and clinical judgement create a POC that gives a: Questionable prediction of patient's progress: MODERATE COMPLEXITY

## 2021-04-08 ENCOUNTER — HOSPITAL ENCOUNTER (OUTPATIENT)
Dept: PHYSICAL THERAPY | Age: 41
Discharge: HOME | End: 2021-04-08
Payer: COMMERCIAL

## 2021-04-08 PROCEDURE — 97140 MANUAL THERAPY 1/> REGIONS: CPT

## 2021-04-08 PROCEDURE — 97110 THERAPEUTIC EXERCISES: CPT

## 2021-04-08 NOTE — PROGRESS NOTES
Elena Alcaraz [place]  : 1980  Primary: Jovita Nam Ppo  Secondary:  [address] at [address]  [phone]   [phone]        OUTPATIENT PHYSICAL THERAPY: Daily Treatment Note 2021  Visit Count:  Visit count could not be calculated. Make sure you are using a visit which is associated with an episode. ICD-10: Treatment Diagnosis: Pain in Joint, L knee [M25.562]; Difficulty Walking, Not Elsewhere Classified [R26.2]; Muscle Wasting and Atrophy, Not Elsewhere Classified, L thigh [M62.552]  Precautions/Allergies:   Sulfa (sulfonamide antibiotics) and Adhesive   TREATMENT PLAN:  Effective Dates: 2021 TO 2021 (90 days). Frequency/Duration: 2 times a week for 90 Day(s) MEDICAL/REFERRING DIAGNOSIS:  Rupture of anterior cruciate ligament of left knee, initial encounter [S83.512A]  Acute medial meniscus tear, left, initial encounter [E08.710I]   DATE OF ONSET: 3/31/21  REFERRING PHYSICIAN: Yandy Calhoun MD MD Orders: Evaluation and Treat, Per Protocol; 2 x a week for 12 weeks  Return MD Appointment: 21       Pre-treatment Symptoms/Complaints:  Pt. Notes good compliance with precautions so far. Pain remains in the anterior and posterior knee. Pain: Initial: Pain Intensity 1: 8 /10 Post Session:  8/10   Medications Last Reviewed:  2021  Updated Objective Findings:  L knee extension to (-4) degrees today. TREATMENT:     Therapeutic Exercise: (40 Minutes):  Exercises per grid below to improve mobility, strength, balance and coordination. Required moderate visual, verbal and manual cues to promote proper body alignment, promote proper body posture and promote proper body mechanics. Progressed resistance, range and repetitions as indicated.      Date:  2021   Activity/Exercise Parameters   Knee extension ROM  6 minutes   Quad setting 5 minutes   Knee flexion ROM 6 minutes   Crutch training 10 minutes   Weight shifting 5 x 10   Heel slides (<90 degrees) 3 x 10   Calf stretch 4 minutes   Manual Therapy (    Soft Tissue Mobilization Duration  Duration: 15 Minutes): Manual techniques to facilitate improved motion and decreased pain. (Used abbreviations: MET - muscle energy technique; PNF - proprioceptive neuromuscular facilitation; NMR - neuromuscular re-education; a/p - anterior to posterior; p/a - posterior to anterior)   · Joint mobilization: L patellofemoral all directions grade II-III  · Soft tissue mobilization: L quadriceps and calf musculature. Cryotherapy: Game Ready 15 minutes      [employer] Portal  Treatment/Session Summary:    · Response to Treatment:  Pt. is advanced to weight bearing as tolerated with crutch ambulation and brace locked in knee extension today. Pt. demonstrates improved gait performance today. L knee extension ROM exercises are painful but improve with today. .  · Communication/Consultation:  None today  · Equipment provided today:  None today  · Recommendations/Intent for next treatment session: Next visit will focus on ROM and pain relief.     Total Treatment Billable Duration:  40 minutes therapeutic exercise, 15 minutes manual therapy  PT Patient Time In/Time Out  Time In: 1030  Time Out: Melody Horton PT    Future Appointments   Date Time Provider Melody Puentes   4/13/2021  8:45 AM Jenna Hugo MD [facility]   4/13/2021 11:30 AM Fransisco FERNANDES   4/15/2021 11:30 AM [place] DONATO MILLBRIANIUM   4/20/2021  3:30 PM MELBA Araujo   4/22/2021  3:30 PM MELBA Araujo

## 2021-04-12 PROBLEM — Z09 SURGERY FOLLOW-UP EXAMINATION: Status: ACTIVE | Noted: 2021-04-12

## 2021-04-13 ENCOUNTER — HOSPITAL ENCOUNTER (OUTPATIENT)
Dept: PHYSICAL THERAPY | Age: 41
Discharge: HOME | End: 2021-04-13
Payer: COMMERCIAL

## 2021-04-13 PROCEDURE — 97140 MANUAL THERAPY 1/> REGIONS: CPT

## 2021-04-13 PROCEDURE — 97110 THERAPEUTIC EXERCISES: CPT

## 2021-04-13 NOTE — PROGRESS NOTES
Carrington Shearer [place]  : 1980  Primary: Emerald Nam Ppo  Secondary:  [address]  at [facility], [address] [facility]  [phone]   [phone]        OUTPATIENT PHYSICAL THERAPY: Daily Treatment Note 2021  Visit Count:  Visit count could not be calculated. Make sure you are using a visit which is associated with an episode. ICD-10: Treatment Diagnosis: Pain in Joint, L knee [M25.562]; Difficulty Walking, Not Elsewhere Classified [R26.2]; Muscle Wasting and Atrophy, Not Elsewhere Classified, L thigh [M62.552]  Precautions/Allergies:   Sulfa (sulfonamide antibiotics) and Adhesive   TREATMENT PLAN:  Effective Dates: 2021 TO 2021 (90 days). Frequency/Duration: 2 times a week for 90 Day(s) MEDICAL/REFERRING DIAGNOSIS:  Rupture of anterior cruciate ligament of left knee, initial encounter [S83.512A]  Acute medial meniscus tear, left, initial encounter [S83.242A]   DATE OF ONSET: 3/31/21  REFERRING PHYSICIAN: Pradip Valente MD MD Orders: Evaluation and Treat, Per Protocol; 2 x a week for 12 weeks  Return MD Appointment: 21       Pre-treatment Symptoms/Complaints:  Pt. Reports good follow up with MD this morning who would like to see improvements in her knee extension ROM. Pain: Initial: Pain Intensity 1: 6 /10 Post Session:  8/10   Medications Last Reviewed:  2021  Updated Objective Findings:  None Today  TREATMENT:     Therapeutic Exercise: (40 Minutes):  Exercises per grid below to improve mobility, strength, balance and coordination. Required moderate visual, verbal and manual cues to promote proper body alignment, promote proper body posture and promote proper body mechanics. Progressed resistance, range and repetitions as indicated.      Date:  2021   Activity/Exercise Parameters   Knee extension ROM  6 minutes   Quad setting 5 minutes with NMES   Knee flexion ROM 6 minutes   Crutch training 10 minutes   Weight shifting 5 x 10 Heel slides (<90 degrees) 3 x 10   Prone Hang 1 min x 2    Calf stretch 4 minutes   Manual Therapy (    Soft Tissue Mobilization Duration  Duration: 15 Minutes): Manual techniques to facilitate improved motion and decreased pain. (Used abbreviations: MET - muscle energy technique; PNF - proprioceptive neuromuscular facilitation; NMR - neuromuscular re-education; a/p - anterior to posterior; p/a - posterior to anterior)   · Joint mobilization: L patellofemoral all directions grade II-III  · Soft tissue mobilization: L quadriceps and calf musculature. Cryotherapy: Game Ready 15 minutes      [employer] Portal  Treatment/Session Summary:    · Response to Treatment:  Knee extension ROM and quad function focus of today's visit. She responds well to addition of NMES with quad sets but still has limited tolerance to prolonged knee extension stretching. .  · Communication/Consultation:  None today  · Equipment provided today:  None today  · Recommendations/Intent for next treatment session: Next visit will focus on ROM and pain relief. Total Treatment Billable Duration:  40 minutes therapeutic exercise, 15 minutes manual therapy  PT Patient Time In/Time Out  Time In: 1130  Time Out: [id number] [facility].  [facility]    Future Appointments   Date Time Provider Melody Puentes   4/15/2021 11:30 AM Vester Cancer [facility]   4/20/2021  3:30 PM Lara Jazmyne, PT [facility]   4/22/2021  3:30 PM Lara Jazmyne, PT [facility]   4/27/2021 10:45 AM Maxine Sung MD [facility] POA   4/27/2021  2:30 PM Lara Jazmyne, PT [facility]   4/29/2021  2:30 PM Lara Jazmyne, PT [facility]   5/4/2021  4:30 PM Lara Jazmyne, PT [facility]   5/6/2021  4:30 PM Lara Jazmyne, PT [facility]   5/10/2021  4:30 PM Lara Jazmyne, PT [facility]   5/12/2021  4:30 PM Lara Jazmyne, PT [facility]   5/18/2021  4:30 PM Lara Jazmyne, PT [facility]   5/20/2021  4:30 PM Lara Jazmyne, PT [facility]

## 2021-04-15 ENCOUNTER — HOSPITAL ENCOUNTER (OUTPATIENT)
Dept: PHYSICAL THERAPY | Age: 41
Discharge: HOME | End: 2021-04-15
Payer: COMMERCIAL

## 2021-04-15 PROCEDURE — 97110 THERAPEUTIC EXERCISES: CPT

## 2021-04-15 PROCEDURE — 97140 MANUAL THERAPY 1/> REGIONS: CPT

## 2021-04-15 NOTE — PROGRESS NOTES
Jose Francisco Burnette [place]  : 1980  Primary: Austin Nam Ppo  Secondary:  [address]  at [facility], [address] [facility]  [phone]   [phone]        OUTPATIENT PHYSICAL THERAPY: Daily Treatment Note 4/15/2021  Visit Count:  Visit count could not be calculated. Make sure you are using a visit which is associated with an episode. ICD-10: Treatment Diagnosis: Pain in Joint, L knee [M25.562]; Difficulty Walking, Not Elsewhere Classified [R26.2]; Muscle Wasting and Atrophy, Not Elsewhere Classified, L thigh [M62.552]  Precautions/Allergies:   Sulfa (sulfonamide antibiotics) and Adhesive   TREATMENT PLAN:  Effective Dates: 2021 TO 2021 (90 days). Frequency/Duration: 2 times a week for 90 Day(s) MEDICAL/REFERRING DIAGNOSIS:  Rupture of anterior cruciate ligament of left knee, initial encounter [S83.512A]  Acute medial meniscus tear, left, initial encounter [N52.879Z]   DATE OF ONSET: 3/31/21  REFERRING PHYSICIAN: Aliya Concepcion MD MD Orders: Evaluation and Treat, Per Protocol; 2 x a week for 12 weeks  Return MD Appointment: 21       Pre-treatment Symptoms/Complaints:  Pt. Eliane Ku she's been trying to stay consistent with her knee extension stretching at home. Pain: Initial: Pain Intensity 1: 5 /10 Post Session:  3/10   Medications Last Reviewed:  4/15/2021  Updated Objective Findings:  None Today  TREATMENT:     Therapeutic Exercise: (40 Minutes):  Exercises per grid below to improve mobility, strength, balance and coordination. Required moderate visual, verbal and manual cues to promote proper body alignment, promote proper body posture and promote proper body mechanics. Progressed resistance, range and repetitions as indicated.      Date:  4/15/2021   Activity/Exercise Parameters   Knee extension ROM  6 minutes   Quad setting 5 minutes with NMES   Knee flexion ROM 6 minutes   Crutch training 10 minutes   Weight shifting 5 x 10   Heel slides (<90 degrees) 3 x 10   Prone Hang 1 min x 2    Calf stretch 4 minutes   Manual Therapy (    Soft Tissue Mobilization Duration  Duration: 15 Minutes): Manual techniques to facilitate improved motion and decreased pain. (Used abbreviations: MET - muscle energy technique; PNF - proprioceptive neuromuscular facilitation; NMR - neuromuscular re-education; a/p - anterior to posterior; p/a - posterior to anterior)   · Joint mobilization: L patellofemoral all directions grade II-III  · Soft tissue mobilization: L quadriceps and calf musculature. Cryotherapy: Game Ready 15 minutes (not today)      [email or web address] Portal  Treatment/Session Summary:    · Response to Treatment:  Tolerance to end range extension continues to improve but swelling seems to be biggest limitation to achieving full ROM at this point. Overall quad function continues to be slow, despite NMES use this week. .  · Communication/Consultation:  None today  · Equipment provided today:  None today  · Recommendations/Intent for next treatment session: Next visit will focus on ROM and pain relief. Total Treatment Billable Duration:  40 minutes therapeutic exercise, 15 minutes manual therapy  PT Patient Time In/Time Out  Time In: 1130  Time Out: [address] [facility] [address].  [facility]    Future Appointments   Date Time Provider Melody Puentes   4/20/2021  3:30 PM [place] Memos, [place] [facility]   4/22/2021  3:30 PM Baltimore Memos, PT [facility]   4/27/2021 10:45 AM Purnima Eisenberg MD PO[facility]   4/27/2021  2:30 PM Baltimore Memos, PT [facility]   4/29/2021  2:30 PM Baltimore Memos, PT [facility]   5/4/2021  4:30 PM Sean Memos, PT [facility]   5/6/2021  4:30 PM Sean Memos, PT [facility]   5/10/2021  4:30 PM [place] Memos, PT [facility]   5/12/2021  4:30 PM Sean Memos, PT [facility]   5/18/2021  4:30 PM Sean Memos, PT [facility]   5/20/2021  4:30 PM Sean Memos, PT [facility]

## 2021-04-20 ENCOUNTER — HOSPITAL ENCOUNTER (OUTPATIENT)
Dept: PHYSICAL THERAPY | Age: 41
Discharge: HOME | End: 2021-04-20
Payer: COMMERCIAL

## 2021-04-20 PROCEDURE — 97140 MANUAL THERAPY 1/> REGIONS: CPT

## 2021-04-20 PROCEDURE — 97110 THERAPEUTIC EXERCISES: CPT

## 2021-04-20 NOTE — PROGRESS NOTES
Nikkie Frederickmar  : 1980  Primary: Marcelina Nam Ppo  Secondary:  [address]  at [facility] [address], [address] [facility]  [phone]   [phone]        OUTPATIENT PHYSICAL THERAPY: Daily Treatment Note 2021  Visit Count:  Visit count could not be calculated. Make sure you are using a visit which is associated with an episode. ICD-10: Treatment Diagnosis: Pain in Joint, L knee [M25.562]; Difficulty Walking, Not Elsewhere Classified [R26.2]; Muscle Wasting and Atrophy, Not Elsewhere Classified, L thigh [M62.552]  Precautions/Allergies:   Sulfa (sulfonamide antibiotics) and Adhesive   TREATMENT PLAN:  Effective Dates: 2021 TO 2021 (90 days). Frequency/Duration: 2 times a week for 90 Day(s) MEDICAL/REFERRING DIAGNOSIS:  Rupture of anterior cruciate ligament of left knee, initial encounter [S83.512A]  Acute medial meniscus tear, left, initial encounter [G33.773K]   DATE OF ONSET: 3/31/21  REFERRING PHYSICIAN: Maxine Sung MD MD Orders: Evaluation and Treat, Per Protocol; 2 x a week for 12 weeks  Return MD Appointment: 21       Pre-treatment Symptoms/Complaints:  Pt. Reports continued improvement in symptoms overall. Pain: Initial: Pain Intensity 1: 5 /10 Post Session:  4/10   Medications Last Reviewed:  2021  Updated Objective Findings:  L knee extension (-5) degrees. TREATMENT:     Therapeutic Exercise: (45 Minutes):  Exercises per grid below to improve mobility, strength, balance and coordination. Required moderate visual, verbal and manual cues to promote proper body alignment, promote proper body posture and promote proper body mechanics. Progressed resistance, range and repetitions as indicated.      Date:  2021   Activity/Exercise Parameters   Knee extension ROM  6 minutes   Quad setting 10 minutes with NMES   Knee flexion ROM 6 minutes   Crutch training --   Weight shifting    Heel slides (<90 degrees) 3 x 10   Prone Hang 1 min x 2    Calf stretch 4 minutes   Hip abduction 2 x 10   Hip extension 2 x 10   Manual Therapy (    Soft Tissue Mobilization Duration  Duration: 10 Minutes): Manual techniques to facilitate improved motion and decreased pain. (Used abbreviations: MET - muscle energy technique; PNF - proprioceptive neuromuscular facilitation; NMR - neuromuscular re-education; a/p - anterior to posterior; p/a - posterior to anterior)   · Joint mobilization: L patellofemoral all directions grade III-IV  · Soft tissue mobilization: L quadriceps and calf musculature. Cryotherapy: Game Ready 15 minutes (not today)      [employer] Portal  Treatment/Session Summary:    · Response to Treatment:  Pt. tolerates todays therapeutic exercise without complaints and minimal post treatment soreness. .  · Communication/Consultation:  None today  · Equipment provided today:  None today  · Recommendations/Intent for next treatment session: Next visit will focus on ROM and pain relief.     Total Treatment Billable Duration:  45 minutes therapeutic exercise, 10 minutes manual therapy  PT Patient Time In/Time Out  Time In: 1530  Time Out: Elio Fishman, PT    Future Appointments   Date Time Provider Melody Puentes   4/22/2021  3:30 PM Siri Farooq, PT [facility]   4/27/2021 10:45 AM Justo Vila MD [facility]   4/27/2021  2:30 PM Siri Farooq, PT [facility]   4/29/2021  2:30 PM Siri Farooq, PT [facility]   5/4/2021  4:30 PM Siri Farooq, PT [facility]   5/6/2021  4:30 PM Siri Farooq, PT [facility]   5/10/2021  4:30 PM Siri Farooq, PT [facility]   5/12/2021  4:30 PM Siri Farooq, PT [facility]   5/18/2021  4:30 PM Siri Farooq, PT [facility]   5/20/2021  4:30 PM Siri Farooq, PT [facility]

## 2021-04-22 ENCOUNTER — HOSPITAL ENCOUNTER (OUTPATIENT)
Dept: PHYSICAL THERAPY | Age: 41
Discharge: HOME | End: 2021-04-22
Payer: COMMERCIAL

## 2021-04-22 PROCEDURE — 97140 MANUAL THERAPY 1/> REGIONS: CPT

## 2021-04-22 PROCEDURE — 97110 THERAPEUTIC EXERCISES: CPT

## 2021-04-22 NOTE — PROGRESS NOTES
Chad Turner [place]  : 1980  Primary: Devin Nam o  Secondary:  [address]  at [facility], [address] [facility]  [phone]   [phone]        OUTPATIENT PHYSICAL THERAPY: Daily Treatment Note 2021  Visit Count:  Visit count could not be calculated. Make sure you are using a visit which is associated with an episode. ICD-10: Treatment Diagnosis: Pain in Joint, L knee [M25.562]; Difficulty Walking, Not Elsewhere Classified [R26.2]; Muscle Wasting and Atrophy, Not Elsewhere Classified, L thigh [M62.552]  Precautions/Allergies:   Sulfa (sulfonamide antibiotics) and Adhesive   TREATMENT PLAN:  Effective Dates: 2021 TO 2021 (90 days). Frequency/Duration: 2 times a week for 90 Day(s) MEDICAL/REFERRING DIAGNOSIS:  Rupture of anterior cruciate ligament of left knee, initial encounter [S83.512A]  Acute medial meniscus tear, left, initial encounter [M79.006F]   DATE OF ONSET: 3/31/21  REFERRING PHYSICIAN: Justo Vila MD MD Orders: Evaluation and Treat, Per Protocol; 2 x a week for 12 weeks  Return MD Appointment: 21       Pre-treatment Symptoms/Complaints:  Pt. Notes improved pain in the L knee this week. Pain: Initial: Pain Intensity 1: 5 /10 Post Session:  4/10   Medications Last Reviewed:  2021  Updated Objective Findings:  None Today  TREATMENT:     Therapeutic Exercise: (40 Minutes):  Exercises per grid below to improve mobility, strength, balance and coordination. Required moderate visual, verbal and manual cues to promote proper body alignment, promote proper body posture and promote proper body mechanics. Progressed resistance, range and repetitions as indicated.      Date:  2021   Activity/Exercise Parameters   Knee extension ROM  8 minutes   Quad setting 10 minutes with NMES   Knee flexion ROM 6 minutes   Crutch training 4 minutes   Weight shifting 2 minutes   Heel slides (<90 degrees) 3 x 10   Prone Hang --    Calf stretch 4 minutes   Hip abduction --   Hip extension 2 x 10   Mini squat 3 x 10       Manual Therapy (    Soft Tissue Mobilization Duration  Duration: 15 Minutes): Manual techniques to facilitate improved motion and decreased pain. (Used abbreviations: MET - muscle energy technique; PNF - proprioceptive neuromuscular facilitation; NMR - neuromuscular re-education; a/p - anterior to posterior; p/a - posterior to anterior)   · Joint mobilization: L patellofemoral all directions grade III-IV  · Soft tissue mobilization: L quadriceps and calf musculature. Cryotherapy: Game Ready 15 minutes (not today)      [employer] Portal  Treatment/Session Summary:    · Response to Treatment:  L knee extension ROM improves this week. Pt. continues to demonstrate trace activation of quadriceps musculature during quadriceps setting. Pt. is advised to continue ROM and quad setting exercises at home. .  · Communication/Consultation:  None today  · Equipment provided today:  None today  · Recommendations/Intent for next treatment session: Next visit will focus on ROM and quadriceps activation.      Total Treatment Billable Duration:  40 minutes therapeutic exercise, 15 minutes manual therapy  PT Patient Time In/Time Out  Time In: 1530  Time Out: Elio Fishman, PT    Future Appointments   Date Time Provider Melody Puentes   4/27/2021 10:45 AM Earl Nguyen MD [facility]   4/27/2021  2:30 PM Michaela Jillian, PT [facility]   4/29/2021  2:30 PM Michaela Jillian, PT [facility]   5/4/2021  4:30 PM Michaela Jillian, PT [facility]   5/6/2021  4:30 PM Michaela Jillian, PT [facility]   5/10/2021  4:30 PM Michaela Jillian, PT [facility]   5/12/2021  4:30 PM Michaela Jillian, PT [facility]   5/18/2021  4:30 PM Michaela Jillian, PT [facility]   5/20/2021  4:30 PM Michaela Jillian, PT [facility]

## 2021-04-26 PROBLEM — Z00.00 PHYSICAL EXAM, ROUTINE: Status: ACTIVE | Noted: 2021-04-20

## 2021-04-26 PROBLEM — G47.00 INSOMNIA: Status: ACTIVE | Noted: 2021-04-21

## 2021-04-26 PROBLEM — M79.642 PAIN IN BOTH HANDS: Status: ACTIVE | Noted: 2021-04-21

## 2021-04-26 PROBLEM — R76.8 POSITIVE ANA (ANTINUCLEAR ANTIBODY): Status: ACTIVE | Noted: 2021-04-21

## 2021-04-26 PROBLEM — F41.9 ANXIETY: Status: ACTIVE | Noted: 2021-04-21

## 2021-04-26 PROBLEM — M79.641 PAIN IN BOTH HANDS: Status: ACTIVE | Noted: 2021-04-21

## 2021-04-27 ENCOUNTER — HOSPITAL ENCOUNTER (OUTPATIENT)
Dept: PHYSICAL THERAPY | Age: 41
Discharge: HOME | End: 2021-04-27
Payer: COMMERCIAL

## 2021-04-27 ENCOUNTER — HOSPITAL ENCOUNTER (EMERGENCY)
Age: 41
Discharge: HOME | End: 2021-04-27
Attending: EMERGENCY MEDICINE
Payer: COMMERCIAL

## 2021-04-27 VITALS
HEIGHT: 66 IN | RESPIRATION RATE: 16 BRPM | SYSTOLIC BLOOD PRESSURE: 128 MMHG | TEMPERATURE: 98 F | DIASTOLIC BLOOD PRESSURE: 86 MMHG | OXYGEN SATURATION: 100 % | HEART RATE: 97 BPM | BODY MASS INDEX: 26.03 KG/M2 | WEIGHT: 162 LBS

## 2021-04-27 DIAGNOSIS — I82.4Z2 DVT, LOWER EXTREMITY, DISTAL, ACUTE, LEFT (HCC): Primary | ICD-10-CM

## 2021-04-27 PROBLEM — M79.662 PAIN OF LEFT CALF: Status: ACTIVE | Noted: 2021-04-27

## 2021-04-27 PROCEDURE — 99283 EMERGENCY DEPT VISIT LOW MDM: CPT

## 2021-04-27 PROCEDURE — 74011250637 HC RX REV CODE- 250/637: Performed by: EMERGENCY MEDICINE

## 2021-04-27 RX ORDER — RIVAROXABAN 15 MG-20MG
KIT ORAL
Qty: 1 DOSE PACK | Refills: 0 | Status: SHIPPED | OUTPATIENT
Start: 2021-04-27 | End: 2021-05-27 | Stop reason: DRUGHIGH

## 2021-04-27 RX ADMIN — RIVAROXABAN 15 MG: 15 TABLET, FILM COATED ORAL at 16:53

## 2021-04-27 NOTE — DISCHARGE INSTRUCTIONS
Take your medicines as prescribed. Return to the emergency department for chest pain, shortness of breath or any other concerns.

## 2021-04-27 NOTE — PROGRESS NOTES
Hugo Roldan  : 1980  Payor: Debbie Conti / Plan: Noreen Kelly PPO / Product Type: PPO /  [id number]  at [address]  [phone]   [phone]          DATE: 2021    Patient cancelled appointment today due to US to rule out blood clot. Will plan to follow up on next scheduled visit.     Burke Hanks, PT, DPT, OCS

## 2021-04-27 NOTE — ED PROVIDER NOTES
Alana Barron is a 41 y.o. female seen on 2021 in the [facility] EMERGENCY DEPT in room ERS/S. Chief Complaint   Patient presents with    Blood Clot     HPI: 43-year-old  female was in the emergency department with complaints of a known DVT to her left soleal vein. Patient is 4 weeks status post knee surgery. Patient  was complaining of  left calf pain this morning physical therapy and a venous Doppler was obtained. Patient with no proximal clot. Patient denies any fever, chest pain, shortness of breath, changes in bowel bladder habits or other concerns. She has done well with her knee rehab and has no new complaints. Her only complaint is pain to the back of her left calf. Historian: Patient    REVIEW OF SYSTEMS     Review of Systems   Constitutional: Negative for fever. Respiratory: Negative for shortness of breath. Cardiovascular: Negative for chest pain and palpitations. Musculoskeletal: Positive for gait problem and myalgias. All other systems reviewed and are negative. PAST MEDICAL HISTORY     Past Medical History:   Diagnosis Date    IBS (irritable bowel syndrome)     Insomnia      Past Surgical History:   Procedure Laterality Date    HX GYN       x 1    HX GYN      laparoscopic    HX HEENT  2011    sinus surgery    CO BREAST SURGERY PROCEDURE UNLISTED  2019    breast reduction     Social History     Socioeconomic History    Marital status:      Spouse name: Not on file    Number of children: Not on file    Years of education: Not on file    Highest education level: Not on file   Tobacco Use    Smoking status: Never Smoker    Smokeless tobacco: Never Used   Substance and Sexual Activity    Alcohol use: Yes     Comment: occasionally    Drug use: Not Currently     Prior to Admission Medications   Prescriptions Last Dose Informant Patient Reported? Taking?    DISABLED PLACARD (DISABLED PLACARD) DMV   No No   Sig: Take to DMV prior to surgery   cyclobenzaprine (FLEXERIL) 5 mg tablet   No No   Sig: Take 1-2 Tabs by mouth nightly. linaCLOtide (LINZESS) 72 mcg cap capsule   Yes No   Sig: Take 72 mcg by mouth daily as needed. meloxicam (MOBIC) 15 mg tablet   No No   Sig: Take 1 Tab by mouth daily. Indications: Do not take at some time as steroid dose pack   ondansetron (ZOFRAN ODT) 4 mg disintegrating tablet   No No   Si Tab by SubLINGual route every six (6) hours as needed for Nausea or Nausea or Vomiting. To start after surgery  Indications: prevent nausea and vomiting after surgery   oxyCODONE IR (ROXICODONE) 5 mg immediate release tablet   No No   Sig: Take 1 Tab by mouth every eight (8) hours as needed for Pain for up to 20 days. Max Daily Amount: 15 mg.   senna-docusate (PERICOLACE) 8.6-50 mg per tablet   No No   Sig: Take 1 Tab by mouth daily. To start after surgery  Indications: constipation   sennosides/docusate sodium (SENNA-S PO)   Yes No   zolpidem CR (AMBIEN CR) 12.5 mg tablet   Yes No   Sig: Take 12.5 mg by mouth nightly. Facility-Administered Medications: None     Allergies   Allergen Reactions    Sulfa (Sulfonamide Antibiotics) Anaphylaxis    Adhesive Rash        PHYSICAL EXAM       Vitals:    21 1524   BP: (!) 152/78   Pulse: 85   Resp: 16   Temp: 98 °F (36.7 °C)   SpO2: 98%    Vital signs were reviewed. Physical Exam  Vitals signs and nursing note reviewed. Constitutional:       Appearance: Normal appearance. HENT:      Head: Atraumatic. Mouth/Throat:      Mouth: Mucous membranes are moist.   Eyes:      Extraocular Movements: Extraocular movements intact. Cardiovascular:      Rate and Rhythm: Normal rate and regular rhythm. Pulmonary:      Effort: Pulmonary effort is normal.   Musculoskeletal:      Comments: Left lower extremity with knee brace  in place. Mild posterior calf tenderness to palpation. Skin:     General: Skin is warm and dry. Neurological:      General: No focal deficit present. Mental Status: She is alert and oriented to person, place, and time. Psychiatric:         Mood and Affect: Mood normal.         Behavior: Behavior normal.         Thought Content: Thought content normal.         Judgment: Judgment normal.          MEDICAL DECISION MAKING     ED Course:    No results found for this or any previous visit (from the past 8 hour(s)). No results found. MDM  Number of Diagnoses or Management Options  DVT, lower extremity, distal, acute, left [facility])  Diagnosis management comments: 67-year-old female presented to the emergency department with complaints of left calf pain and newly diagnosed distal DVT. Patient be placed on Xarelto and will follow up with Dr. Jayson De Leon. She has no complaints of chest pain, shortness of breath. She has no other complaints at this time. Amount and/or Complexity of Data Reviewed  Tests in the radiology section of CPT®: reviewed  Discuss the patient with other providers: yes    Patient Progress  Patient progress: stable        Disposition: Discharged  Diagnosis:     ICD-10-CM ICD-9-CM   1. DVT, lower extremity, distal, acute, left ([facility])  I82.4Z2 453.42     ____________________________________________________________________  A portion of this note was generated using voice recognition dictation software. While the note has been reviewed for accuracy, please note certain words and phrases may not be transcribed as intended and some grammatical and/or typographical errors may be present.

## 2021-04-27 NOTE — ED TRIAGE NOTES
Patient arrives ambulatory to triage with mask in place. Patient reports having follow up appt from recent knee surgery today and being sent for DVT. Reports having ultrasound and being sent to ER for positive DVT in LLE.

## 2021-04-28 ENCOUNTER — HOSPITAL ENCOUNTER (OUTPATIENT)
Dept: PHYSICAL THERAPY | Age: 41
Discharge: HOME | End: 2021-04-28
Payer: COMMERCIAL

## 2021-04-28 PROCEDURE — 97110 THERAPEUTIC EXERCISES: CPT

## 2021-04-28 PROCEDURE — 97140 MANUAL THERAPY 1/> REGIONS: CPT

## 2021-04-28 NOTE — PROGRESS NOTES
Hugo Roldan  : 1980  Primary: [employer] Ppo  Secondary:  Therapy Center at [address]  [phone]   [phone]        OUTPATIENT PHYSICAL THERAPY: Daily Treatment Note 2021  Visit Count:  1    ICD-10: Treatment Diagnosis: Pain in Joint, L knee [M25.562]; Difficulty Walking, Not Elsewhere Classified [R26.2]; Muscle Wasting and Atrophy, Not Elsewhere Classified, L thigh [M62.552]  Precautions/Allergies:   Sulfa (sulfonamide antibiotics) and Adhesive   TREATMENT PLAN:  Effective Dates: 2021 TO 2021 (90 days). Frequency/Duration: 2 times a week for 90 Day(s) MEDICAL/REFERRING DIAGNOSIS:  Rupture of anterior cruciate ligament of left knee, initial encounter [S83.512A]  Acute medial meniscus tear, left, initial encounter [R29.711Z]   DATE OF ONSET: 3/31/21  REFERRING PHYSICIAN: Darvin Momin MD MD Orders: Evaluation and Treat, Per Protocol; 2 x a week for 12 weeks  Return MD Appointment: 21       Pre-treatment Symptoms/Complaints:  Pt. Reports good f/u with referring MD yesterday. MD suspected blood clot however and patient did have a clot in the L calf. Pt. Is managing clot with medication at this time. Pain: Initial: Pain Intensity 1: 5 /10 Post Session:  10   Medications Last Reviewed:  2021  Updated Objective Findings:  None Today  TREATMENT:     Therapeutic Exercise: (30 Minutes):  Exercises per grid below to improve mobility, strength, balance and coordination. Required moderate visual, verbal and manual cues to promote proper body alignment, promote proper body posture and promote proper body mechanics. Progressed resistance, range and repetitions as indicated.      Date:  2021   Activity/Exercise Parameters   Knee extension ROM  8 minutes   Quad setting 8 minutes with NMES   Knee flexion ROM 6 minutes   Crutch training --   Weight shifting 2 minutes   Heel slides (<90 degrees) 3 x 10   Prone Hang --    Calf stretch --   Hip abduction --   Hip extension    Mini squat 3 x 10       Manual Therapy (    Soft Tissue Mobilization Duration  Duration: 10 Minutes): Manual techniques to facilitate improved motion and decreased pain. (Used abbreviations: MET - muscle energy technique; PNF - proprioceptive neuromuscular facilitation; NMR - neuromuscular re-education; a/p - anterior to posterior; p/a - posterior to anterior)   · Joint mobilization: L patellofemoral all directions grade III-IV  · Soft tissue mobilization: L quadriceps and calf musculature. [facility] Portal  Treatment/Session Summary:    · Response to Treatment:  Pt. continues to demonstrates difficulty with quadriceps activation during quad setting. L knee extension ROM is improve but remains limited compared to R side. .  · Communication/Consultation:  None today  · Equipment provided today:  None today  · Recommendations/Intent for next treatment session: Next visit will focus on ROM and quadriceps activation.      Total Treatment Billable Duration:  40 minutes total time  PT Patient Time In/Time Out  Time In: 5790  Time Out: [address], PT    Future Appointments   Date Time Provider Melody Puentes   4/29/2021  2:30 PM Seng Jass, PT [facility]   5/4/2021  4:30 PM Seng Jass, PT [facility]   5/6/2021  4:30 PM Seng Jass, PT [facility]   5/10/2021  4:30 PM Seng Jass, PT [facility]   5/12/2021  4:30 PM Seng Jass, PT [facility]   5/18/2021  8:30 AM Marilia Mendez MD [facility]   5/18/2021  4:30 PM Seng Jass, PT [facility]   5/20/2021  4:30 PM Seng Jass, PT [facility]

## 2021-04-29 ENCOUNTER — HOSPITAL ENCOUNTER (OUTPATIENT)
Dept: PHYSICAL THERAPY | Age: 41
Discharge: HOME | End: 2021-04-29
Payer: COMMERCIAL

## 2021-04-29 PROCEDURE — 97110 THERAPEUTIC EXERCISES: CPT

## 2021-04-29 PROCEDURE — 97140 MANUAL THERAPY 1/> REGIONS: CPT

## 2021-04-29 NOTE — PROGRESS NOTES
Hugo Roldan  : 1980  Primary: [employer] Ppo  Secondary:  Therapy Center at [address], [address] [place] [address]  [phone]   [phone]        OUTPATIENT PHYSICAL THERAPY: Daily Treatment Note 2021  Visit Count:  2    ICD-10: Treatment Diagnosis: Pain in Joint, L knee [M25.562]; Difficulty Walking, Not Elsewhere Classified [R26.2]; Muscle Wasting and Atrophy, Not Elsewhere Classified, L thigh [M62.552]  Precautions/Allergies:   Sulfa (sulfonamide antibiotics) and Adhesive   TREATMENT PLAN:  Effective Dates: 2021 TO 2021 (90 days). Frequency/Duration: 2 times a week for 90 Day(s) MEDICAL/REFERRING DIAGNOSIS:  Rupture of anterior cruciate ligament of left knee, initial encounter [S83.512A]  Acute medial meniscus tear, left, initial encounter [T53.426W]   DATE OF ONSET: 3/31/21  REFERRING PHYSICIAN: Darvin Momin MD MD Orders: Evaluation and Treat, Per Protocol; 2 x a week for 12 weeks  Return MD Appointment: 21       Pre-treatment Symptoms/Complaints:  Pt. Denies complaints from previous PT session yesterday. Pain: Initial: Pain Intensity 1: 5 /10 Post Session:  4/10   Medications Last Reviewed:  2021  Updated Objective Findings:  L knee extension (-3) degrees  TREATMENT:     Therapeutic Exercise: (40 Minutes):  Exercises per grid below to improve mobility, strength, balance and coordination. Required moderate visual, verbal and manual cues to promote proper body alignment, promote proper body posture and promote proper body mechanics. Progressed resistance, range and repetitions as indicated.      Date:  2021   Activity/Exercise Parameters   Knee extension ROM  8 minutes   Quad setting 8 minutes with NMES   Knee flexion ROM 5 minutes   Crutch training --   Weight shifting 2 minutes   Heel slides (<90 degrees) 3 x 10   Prone Hang --    Calf stretch 3 minutes   Hip abduction 3 x 10   Hip extension 3 x 10   Mini squat -- Shuttle (3 bands) 2 x 10   Manual Therapy (    Soft Tissue Mobilization Duration  Duration: 15 Minutes): Manual techniques to facilitate improved motion and decreased pain. (Used abbreviations: MET - muscle energy technique; PNF - proprioceptive neuromuscular facilitation; NMR - neuromuscular re-education; a/p - anterior to posterior; p/a - posterior to anterior)   · Joint mobilization: L patellofemoral all directions grade III-IV  · Soft tissue mobilization: L quadriceps and calf musculature. [facility] Portal  Treatment/Session Summary:    · Response to Treatment:  Pt. demonstrates some improvement with quadriceps neuromuscular activation this week. L knee extension remains limited compared to R side. .  · Communication/Consultation:  None today  · Equipment provided today:  None today  · Recommendations/Intent for next treatment session: Next visit will focus on ROM and quadriceps activation.      Total Treatment Billable Duration:  55 minutes total time  PT Patient Time In/Time Out  Time In: 1430  Time Out: [address], PT    Future Appointments   Date Time Provider Melody Puentes   5/4/2021  4:30 PM Jae Andrews PT [facility]   5/6/2021  4:30 PM Jae Andrews PT [facility]   5/10/2021  2:45 PM [facility]   5/10/2021  4:30 PM Jae Andrews PT [facility]   5/12/2021  4:30 PM Jae Andrews PT [facility]   5/18/2021  8:30 AM Aliya Ruffin MD [facility]   5/18/2021  4:30 PM Jae Andrews PT [facility]   5/20/2021  4:30 PM Jae Andrews PT [facility]

## 2021-05-04 ENCOUNTER — HOSPITAL ENCOUNTER (OUTPATIENT)
Dept: PHYSICAL THERAPY | Age: 41
Discharge: HOME | End: 2021-05-04
Payer: COMMERCIAL

## 2021-05-04 PROCEDURE — 97140 MANUAL THERAPY 1/> REGIONS: CPT

## 2021-05-04 PROCEDURE — 97110 THERAPEUTIC EXERCISES: CPT

## 2021-05-04 NOTE — PROGRESS NOTES
Hugo Roldan  : 1980  Primary: [employer] Ppo  Secondary:  Therapy Center at [place] [address], [address]  [phone]   [phone]        OUTPATIENT PHYSICAL THERAPY: Daily Treatment Note 2021  Visit Count:  3    ICD-10: Treatment Diagnosis: Pain in Joint, L knee [M25.562]; Difficulty Walking, Not Elsewhere Classified [R26.2]; Muscle Wasting and Atrophy, Not Elsewhere Classified, L thigh [M62.552]  Precautions/Allergies:   Sulfa (sulfonamide antibiotics) and Adhesive   TREATMENT PLAN:  Effective Dates: 2021 TO 2021 (90 days). Frequency/Duration: 2 times a week for 90 Day(s) MEDICAL/REFERRING DIAGNOSIS:  Rupture of anterior cruciate ligament of left knee, initial encounter [S83.512A]  Acute medial meniscus tear, left, initial encounter [X54.042T]   DATE OF ONSET: 3/31/21  REFERRING PHYSICIAN: Daisy Zaidi MD MD Orders: Evaluation and Treat, Per Protocol; 2 x a week for 12 weeks  Return MD Appointment: 21       Pre-treatment Symptoms/Complaints:  Pt. Reports minimal pain over weekend but some discomfort today. Pt. Notes she was on her feet a good bit today. Pain: Initial: Pain Intensity 1: 5 /10 Post Session:  4/10   Medications Last Reviewed:  2021  Updated Objective Findings:  L knee extension (-3) at the start of PT and (-1) end of PT. TREATMENT:     Therapeutic Exercise: (40 Minutes):  Exercises per grid below to improve mobility, strength, balance and coordination. Required moderate visual, verbal and manual cues to promote proper body alignment, promote proper body posture and promote proper body mechanics. Progressed resistance, range and repetitions as indicated.      Date:  2021   Activity/Exercise Parameters   Knee extension ROM  8 minutes   Quad setting 10 minutes with NMES   Knee flexion ROM 5 minutes   Crutch training --   Weight shifting --   Heel slides (<90 degrees) 3 x 10   Prone Hang --    Calf stretch 5 minutes   Hip abduction 3 x 10   Hip extension 3 x 10   Mini squat --   Shuttle (3 bands) --   Manual Therapy (    Soft Tissue Mobilization Duration  Duration: 15 Minutes): Manual techniques to facilitate improved motion and decreased pain. (Used abbreviations: MET - muscle energy technique; PNF - proprioceptive neuromuscular facilitation; NMR - neuromuscular re-education; a/p - anterior to posterior; p/a - posterior to anterior)   · Joint mobilization: L patellofemoral all directions grade III-IV  · Soft tissue mobilization: L quadriceps, hamstrings and calf musculature. [facility] Portal  Treatment/Session Summary:    · Response to Treatment:  L knee extension remains slow to improve but demonstrates improvement on quadriceps setting. Pt. is not quite ready to advance to phase II of recommended protocol. · Communication/Consultation:  None today  · Equipment provided today:  None today  · Recommendations/Intent for next treatment session: Next visit will focus on ROM and quadriceps activation.      Total Treatment Billable Duration:  55 minutes total time  PT Patient Time In/Time Out  Time In: 1630  Time Out: [address] [place], PT    Future Appointments   Date Time Provider [facility]   5/6/2021  4:30 PM Lorena Hernandez PT [facility]   5/10/2021  2:45 PM [facility]   5/10/2021  4:30 PM Lorena Hernandez PT [facility]   5/12/2021  4:30 PM Lorena Hernandez PT [facility]   5/18/2021  8:30 AM Pradip Valente MD [facility]   5/18/2021  4:30 PM Lorena Hernandez PT [facility]   5/20/2021  4:30 PM Lorena Hernandez PT [facility]

## 2021-05-06 ENCOUNTER — HOSPITAL ENCOUNTER (OUTPATIENT)
Dept: PHYSICAL THERAPY | Age: 41
Discharge: HOME | End: 2021-05-06
Payer: COMMERCIAL

## 2021-05-06 PROCEDURE — 97110 THERAPEUTIC EXERCISES: CPT

## 2021-05-06 PROCEDURE — 97140 MANUAL THERAPY 1/> REGIONS: CPT

## 2021-05-06 NOTE — PROGRESS NOTES
Hugo Roldan  : 1980  Primary: [employer] Ppo  Secondary:  Therapy Center at [address]  [phone]   [phone]        OUTPATIENT PHYSICAL THERAPY: Daily Treatment Note 2021  Visit Count:  4    ICD-10: Treatment Diagnosis: Pain in Joint, L knee [M25.562]; Difficulty Walking, Not Elsewhere Classified [R26.2]; Muscle Wasting and Atrophy, Not Elsewhere Classified, L thigh [M62.552]  Precautions/Allergies:   Sulfa (sulfonamide antibiotics) and Adhesive   TREATMENT PLAN:  Effective Dates: 2021 TO 2021 (90 days). Frequency/Duration: 2 times a week for 90 Day(s) MEDICAL/REFERRING DIAGNOSIS:  Rupture of anterior cruciate ligament of left knee, initial encounter [S83.512A]  Acute medial meniscus tear, left, initial encounter [C11.642F]   DATE OF ONSET: 3/31/21  REFERRING PHYSICIAN: Mary Turcios MD MD Orders: Evaluation and Treat, Per Protocol; 2 x a week for 12 weeks  Return MD Appointment: 21       Pre-treatment Symptoms/Complaints:  Pt. Notes mild anterior knee pain today. Pain: Initial: Pain Intensity 1: 5 /10 Post Session:  4/10   Medications Last Reviewed:  2021  Updated Objective Findings:  L knee flexion 105 degrees  TREATMENT:     Therapeutic Exercise: (45 Minutes):  Exercises per grid below to improve mobility, strength, balance and coordination. Required moderate visual, verbal and manual cues to promote proper body alignment, promote proper body posture and promote proper body mechanics. Progressed resistance, range and repetitions as indicated.      Date:  2021   Activity/Exercise Parameters   Knee extension ROM  8 minutes   Quad setting 5 minutes   Knee flexion ROM 5 minutes   Gait training 5 minutes   Weight shifting --   Heel slides  3 x 10   Prone Hang --    Calf stretch 2 minutes   Hip abduction --   Hip extension --   Mini squat 3 x 10   Shuttle (4 bands) 3 x 15   bike 5 minutes   Manual Therapy (    Soft Tissue Mobilization Duration  Duration: 10 Minutes): Manual techniques to facilitate improved motion and decreased pain. (Used abbreviations: MET - muscle energy technique; PNF - proprioceptive neuromuscular facilitation; NMR - neuromuscular re-education; a/p - anterior to posterior; p/a - posterior to anterior)   · Joint mobilization: L patellofemoral all directions grade III-IV  · Soft tissue mobilization: L quadriceps, hamstrings and calf musculature. [facility] Portal  Treatment/Session Summary:    · Response to Treatment:  Pt. is advanced to amublation with brace unlocked. Quadriceps neuromuscular activation improves this week but patient continues to have limited L knee extension ROM, . · Communication/Consultation:  None today  · Equipment provided today:  None today  · Recommendations/Intent for next treatment session: Next visit will focus on ROM and quadriceps activation.      Total Treatment Billable Duration:  55 minutes total time  PT Patient Time In/Time Out  Time In: 9045  Time Out: Elio Fishman, PT    Future Appointments   Date Time Provider Melody Rodriguezisti   5/10/2021  2:45 PM Светлана Payton MD [facility]   5/10/2021  4:30 PM Frankie Vásquez, PT [facility]   5/12/2021  4:30 PM Frankie Vásquez, PT [facility]   5/18/2021  8:30 AM Aliya Concepcion MD [facility]   5/18/2021  4:30 PM Frankie Vásquez, PT [facility]   5/20/2021  4:30 PM Frankie Vásquez, PT [facility]

## 2021-05-10 ENCOUNTER — HOSPITAL ENCOUNTER (OUTPATIENT)
Dept: PHYSICAL THERAPY | Age: 41
Discharge: HOME | End: 2021-05-10
Payer: COMMERCIAL

## 2021-05-10 PROCEDURE — 97110 THERAPEUTIC EXERCISES: CPT

## 2021-05-10 PROCEDURE — 97140 MANUAL THERAPY 1/> REGIONS: CPT

## 2021-05-10 NOTE — PROGRESS NOTES
Hugo Roldan  : 1980  Primary: [employer]  Secondary:  Therapy Center at [address]  [phone]   [phone]        OUTPATIENT PHYSICAL THERAPY:Initial Assessment 5/10/2021   ICD-10: Treatment Diagnosis: Pain in Joint, L knee [M25.562]; Difficulty Walking, Not Elsewhere Classified [R26.2]; Muscle Wasting and Atrophy, Not Elsewhere Classified, L thigh [M62.552]  Precautions/Allergies:   Sulfa (sulfonamide antibiotics) and Adhesive   TREATMENT PLAN:  Effective Dates: 2021 TO 2021 (90 days). Frequency/Duration: 2 times a week for 90 Day(s) MEDICAL/REFERRING DIAGNOSIS:  Sprain of anterior cruciate ligament of left knee, initial encounter [S83.512A]  Other tear of medial meniscus, current injury, left knee, initial encounter [S83.242A]   DATE OF ONSET: 3/31/21  REFERRING PHYSICIAN: Edmar Ann MD MD Orders: Evaluation and Treat, Per Protocol; 2 x a week for 12 weeks  Return MD Appointment: 21     INITIAL ASSESSMENT:  Ms. David Ortega presents s/p L ACL repair with allograft and L medial meniscus repair on 3/31/21 to address instability following a L ACL tear. Pt. Attends PT ambulating with 2 crutches and TDWB. Assessment of incision reveals normal signs of healing at this time with moderate swelling present around the L knee. Pt. Demonstrates ROM limitations for L knee flexion and extension at this time. Neuromuscular control of the L quadriceps is diminished and strength deficits are present post surgery. Pt. Will benefit from skilled PT to address impairments identified through evaluation and return to previous level of function     5/10/21 Progress Report: Pt. Attends 10 physical therapy sessions. Pt. Demonstrates improved weightbearing and gait performance since starting PT.  L knee extension range of motion remains limited compared to the R side.   Progress with lower quarter strengthening is limited secondary to knee extension loss and difficulty with neuromuscular control of the quadriceps. Pt. Will benefit from continued PT at this time to address remaining goals. PROBLEM LIST (Impacting functional limitations):  1. Decreased Strength  2. Decreased ADL/Functional Activities  3. Decreased Balance  4. Increased Pain  5. Decreased Activity Tolerance  6. Decreased Flexibility/Joint Mobility  7. Decreased [place] with Home Exercise Program INTERVENTIONS PLANNED: (Treatment may consist of any combination of the following)  1. Balance Exercise  2. Cryotherapy  3. Electrical Stimulation  4. Gait Training  5. Home Exercise Program (HEP)  6. Manual Therapy  7. Neuromuscular Re-education/Strengthening  8. Range of Motion (ROM)  9. Therapeutic Activites  10. Therapeutic Exercise/Strengthening     GOALS: (Goals have been discussed and agreed upon with patient.)  Discharge Goals: Time Frame: 4 months  1. Pt. Will demonstrate < 2/10 L knee pain to improve symptomatic complaints. ONGOING  2. Pt. Will demonstrate >1  Degree of L knee extension to improve ROM. ONGOING  3. Pt. Will demonstrate > 125 degrees of L knee flexion to improve ROM. ONGOING  4. Pt. Will ambulate for >10 minutes without crutches to improve ambulation. MET  5. Pt. Will demonstrate 5 single LE squats on the L LE to demonstrate improved strength. ONGOING  6. Pt. Will be independent with HEP to continue LE strengthening. ONGOING  7. Pt. Will score > 60 on the LEFS to demonstrate improved pain and function. ONGOING    OUTCOME MEASURE:   Tool Used: Lower Extremity Functional Scale (LEFS)  Score:  Initial: 18/80 Most Recent: 26/80 (Date: 5/10/21 )   Interpretation of Score: 20 questions each scored on a 5 point scale with 0 representing \"extreme difficulty or unable to perform\" and 4 representing \"no difficulty\". The lower the score, the greater the functional disability. 80/80 represents no disability. Minimal detectable change is 9 points.     Observation: Gait Observation: Pt. Ambulates with AD. Gait performance reveals limited stance phase on the L LE secondary to knee extension loss. Range of Motion:                                  Right                                        Left  Knee Extension 2 degrees (-5) degrees   Knee Flexion 129 degrees 110 degrees   Ankle Dorsiflexion -- --     Strength:                                                Right                                        Left  Hip Extension 4/5 4/5   Hip Abduction 4/5 4/5   Knee Extension 5/5 3/5   Knee Flexion 5/5 4/5   Functional Squat             MEDICAL NECESSITY:   · Patient is expected to demonstrate progress in strength, range of motion and balance to increase independence with ADL's and recreational activities. .  REASON FOR SERVICES/OTHER COMMENTS:  · Patient will benefit from skilled PT to address impairments identified through evaluation and return to previous ADL and recreational capacity. Total Duration:  PT Patient Time In/Time Out  Time In: 1630  Time Out: 1730    Rehabilitation Potential For Stated Goals: Good  Regarding Hugo Roldan's therapy, I certify that the treatment plan above will be carried out by a therapist or under their direction. Thank you for this referral,  Caryle Roulette, PT     Referring Physician Signature: Ara Castro MD No Signature is Required for this note.

## 2021-05-10 NOTE — PROGRESS NOTES
Hugo Roldan  : 1980  Primary: [employer]  Secondary:  Therapy Center at [address]  [phone]   [phone]        OUTPATIENT PHYSICAL THERAPY: Daily Treatment Note 5/10/2021  Visit Count:  5    ICD-10: Treatment Diagnosis: Pain in Joint, L knee [M25.562]; Difficulty Walking, Not Elsewhere Classified [R26.2]; Muscle Wasting and Atrophy, Not Elsewhere Classified, L thigh [M62.552]  Precautions/Allergies:   Sulfa (sulfonamide antibiotics) and Adhesive   TREATMENT PLAN:  Effective Dates: 2021 TO 2021 (90 days). Frequency/Duration: 2 times a week for 90 Day(s) MEDICAL/REFERRING DIAGNOSIS:  Rupture of anterior cruciate ligament of left knee, initial encounter [S83.512A]  Acute medial meniscus tear, left, initial encounter [S83.242A]   DATE OF ONSET: 3/31/21  REFERRING PHYSICIAN: Steffany Collado MD MD Orders: Evaluation and Treat, Per Protocol; 2 x a week for 12 weeks  Return MD Appointment: 21       Pre-treatment Symptoms/Complaints:  Pt. Reports anterior knee pain with prolonged standing and walking        Pain: Initial: Pain Intensity 1: 5 /10 Post Session:  4/10   Medications Last Reviewed:  5/10/2021  Updated Objective Findings:  L knee flexion to 110 degrees and L knee extension (-5) degrees. TREATMENT:     Therapeutic Exercise: (45 Minutes):  Exercises per grid below to improve mobility, strength, balance and coordination. Required moderate visual, verbal and manual cues to promote proper body alignment, promote proper body posture and promote proper body mechanics. Progressed resistance, range and repetitions as indicated.      Date:  5/10/2021   Activity/Exercise Parameters   Knee extension ROM  10 minutes   Quad setting 5 minutes   Knee flexion ROM 5 minutes   Gait training 5 minutes   Weight shifting --   Heel slides  3 x 10   Prone Hang --    Calf stretch 4 minutes   Hip abduction --   Hip extension --   Mini squat --   Shuttle (4 bands) 3 x 15   bike 5 minutes   Manual Therapy (    Soft Tissue Mobilization Duration  Duration: 10 Minutes): Manual techniques to facilitate improved motion and decreased pain. (Used abbreviations: MET - muscle energy technique; PNF - proprioceptive neuromuscular facilitation; NMR - neuromuscular re-education; a/p - anterior to posterior; p/a - posterior to anterior)   · Joint mobilization: L patellofemoral all directions grade III-IV  · Soft tissue mobilization: L quadriceps, hamstrings and calf musculature. [facility] Portal  Treatment/Session Summary:    · Response to Treatment:  L knee extension ROM remains limited. Pt. demonstrates difficulty with stance phase of gait on the L LE during ambulation secondary to limited knee extension. PT focus will remain on knee extension and quadriceps neuromuscular control. · Communication/Consultation:  None today  · Equipment provided today:  None today  · Recommendations/Intent for next treatment session: Next visit will focus on ROM and quadriceps activation.      Total Treatment Billable Duration:  55 minutes total time  PT Patient Time In/Time Out  Time In: 1630  Time Out: [address] [place], PT    Future Appointments   Date Time Provider Melody Puentes   5/12/2021  4:30 PM Molly Bread, PT [facility]   5/18/2021  8:30 AM Lizabeth Foreman MD [facility]   5/18/2021  4:30 PM Molly Bread, PT [facility]   5/20/2021  4:30 PM Molly Bread, PT [facility]   7/12/2021  4:00 PM Lynn Huang MD [facility]

## 2021-05-12 ENCOUNTER — HOSPITAL ENCOUNTER (OUTPATIENT)
Dept: PHYSICAL THERAPY | Age: 41
Discharge: HOME | End: 2021-05-12
Payer: COMMERCIAL

## 2021-05-12 NOTE — PROGRESS NOTES
Hugo Roldan  : 1980  Payor: Lorena Singh / Plan: Eyad Real PPO / Product Type: PPO /  [address]  at [address]  [phone]   [phone]          DATE: 2021    Patient cancelled appointment today due to emergency with pet. Will plan to follow up on next scheduled visit.     Meagan Gilliland, PT, DPT, OCS

## 2021-05-13 ENCOUNTER — HOSPITAL ENCOUNTER (OUTPATIENT)
Dept: PHYSICAL THERAPY | Age: 41
Discharge: HOME | End: 2021-05-13
Payer: COMMERCIAL

## 2021-05-13 PROCEDURE — 97140 MANUAL THERAPY 1/> REGIONS: CPT

## 2021-05-13 PROCEDURE — 97110 THERAPEUTIC EXERCISES: CPT

## 2021-05-13 NOTE — PROGRESS NOTES
Hugo Roldan  : 1980  Primary: [employer]  Secondary:  Therapy Center at [address]  [phone]   [phone]        OUTPATIENT PHYSICAL THERAPY: Daily Treatment Note 2021  Visit Count:  6    ICD-10: Treatment Diagnosis: Pain in Joint, L knee [M25.562]; Difficulty Walking, Not Elsewhere Classified [R26.2]; Muscle Wasting and Atrophy, Not Elsewhere Classified, L thigh [M62.552]  Precautions/Allergies:   Sulfa (sulfonamide antibiotics) and Adhesive   TREATMENT PLAN:  Effective Dates: 2021 TO 2021 (90 days). Frequency/Duration: 2 times a week for 90 Day(s) MEDICAL/REFERRING DIAGNOSIS:  Rupture of anterior cruciate ligament of left knee, initial encounter [S83.512A]  Acute medial meniscus tear, left, initial encounter [D36.604O]   DATE OF ONSET: 3/31/21  REFERRING PHYSICIAN: Sotero Rashid MD MD Orders: Evaluation and Treat, Per Protocol; 2 x a week for 12 weeks  Return MD Appointment: 21       Pre-treatment Symptoms/Complaints:  Pt. Reports continued annoying pain that worsens in the evening. Pain: Initial: Pain Intensity 1: 5 /10 Post Session:  4/10   Medications Last Reviewed:  2021  Updated Objective Findings:  L knee flexion to 110 degrees and L knee extension (-5) degrees. TREATMENT:     Therapeutic Exercise: ( 10):  Exercises per grid below to improve mobility, strength, balance and coordination. Required moderate visual, verbal and manual cues to promote proper body alignment, promote proper body posture and promote proper body mechanics. Progressed resistance, range and repetitions as indicated.      Date:  2021   Activity/Exercise Parameters   Knee extension ROM    Quad setting    Knee flexion ROM    Gait training 10 minutes   Weight shifting    Heel slides     Prone Hang    Calf stretch    Hip abduction    Hip extension    Mini squat    Shuttle (4 bands)    bike 5 minutes   Manual Therapy ( 45 minutes): Manual techniques to facilitate improved motion and decreased pain. (Used abbreviations: MET - muscle energy technique; PNF - proprioceptive neuromuscular facilitation; NMR - neuromuscular re-education; a/p - anterior to posterior; p/a - posterior to anterior)   · Joint mobilization: L patellofemoral all directions grade III-IV, shearing ant-post, post-ant grade III-IV, seated flexion with anterior tibial glide  · Soft tissue mobilization: L quadriceps, hamstrings and calf musculature. Cupping to anterior and posterior knee        [facility] Portal  Treatment/Session Summary:    · Response to Treatment:  Pt with improved gait by the end of session with cuing to listen to foot-falls and improve symmetry. Significant soft tissue mobility deficits in anterior knee. .  · Communication/Consultation:  None today  · Equipment provided today:  None today  · Recommendations/Intent for next treatment session: Next visit will focus on ROM and quadriceps activation.      Total Treatment Billable Duration:  55 minutes total time  PT Patient Time In/Time Out  Time In: 0930  Time Out: Leah Delvalle 36, PT    Future Appointments   Date Time Provider Melody Puentes   5/14/2021  2:30 PM Ruvalentín Cueva, PT [facility]   5/18/2021  8:30 AM Yumiko Huizar MD [facility]   5/18/2021  4:30 PM Mckenzie Roof, PT [facility]   5/19/2021  2:30 PM Mckenzie Roof, PT [facility]   5/20/2021  4:30 PM Mckenzie Roof, PT [facility]   5/25/2021  2:30 PM Mckenzie Roof, PT [facility]   5/26/2021  2:30 PM Mckenzie Roof, PT [facility]   5/27/2021  3:30 PM Mckenzie Roof, PT [facility]   6/2/2021  2:30 PM Mckenzie Roof, PT [facility]   6/3/2021  2:30 PM Mckenzie Roof, PT [facility]   6/7/2021  2:30 PM Mckenzie Roof, PT [facility]   6/10/2021  2:30 PM Mckenzie Roof, PT [facility]   6/15/2021  2:30 PM Mckenzie Roof, PT [facility]   6/17/2021  2:30 PM Livia Mariscal, [place] Soto, PT [facility]   6/22/2021  2:30 PM Nelta Breeze, PT [facility]   6/24/2021  2:30 PM Nelta Breeze, PT [facility]   6/29/2021  2:30 PM Nelta Breeze, PT [facility]   7/1/2021  2:30 PM Nelta Breeze, PT [facility]   7/12/2021  4:00 PM Ariadne Cisneros MD [facility]

## 2021-05-14 ENCOUNTER — HOSPITAL ENCOUNTER (OUTPATIENT)
Dept: PHYSICAL THERAPY | Age: 41
Discharge: HOME | End: 2021-05-14
Payer: COMMERCIAL

## 2021-05-14 PROCEDURE — 97140 MANUAL THERAPY 1/> REGIONS: CPT

## 2021-05-14 PROCEDURE — 97110 THERAPEUTIC EXERCISES: CPT

## 2021-05-14 NOTE — PROGRESS NOTES
Hugo Roldan  : 1980  Primary: [employer]  Secondary:  Therapy Center at [address]  [phone]   [phone]        OUTPATIENT PHYSICAL THERAPY: Daily Treatment Note 2021  Visit Count:  7    ICD-10: Treatment Diagnosis: Pain in Joint, L knee [M25.562]; Difficulty Walking, Not Elsewhere Classified [R26.2]; Muscle Wasting and Atrophy, Not Elsewhere Classified, L thigh [M62.552]  Precautions/Allergies:   Sulfa (sulfonamide antibiotics) and Adhesive   TREATMENT PLAN:  Effective Dates: 2021 TO 2021 (90 days). Frequency/Duration: 2 times a week for 90 Day(s) MEDICAL/REFERRING DIAGNOSIS:  Rupture of anterior cruciate ligament of left knee, initial encounter [S83.512A]  Acute medial meniscus tear, left, initial encounter [L69.537K]   DATE OF ONSET: 3/31/21  REFERRING PHYSICIAN: Marilia Mendez MD MD Orders: Evaluation and Treat, Per Protocol; 2 x a week for 12 weeks  Return MD Appointment: 21       Pre-treatment Symptoms/Complaints:  Pt. Reports that she is stiff today but did ok after last visit. Pain: Initial: Pain Intensity 1: 5 /10 Post Session:  4/10   Medications Last Reviewed:  2021  Updated Objective Findings:  L knee flexion to 110 degrees and L knee extension (-5) degrees. TREATMENT:     Therapeutic Exercise: ( 10):  Exercises per grid below to improve mobility, strength, balance and coordination. Required moderate visual, verbal and manual cues to promote proper body alignment, promote proper body posture and promote proper body mechanics. Progressed resistance, range and repetitions as indicated.      Date:  2021   Activity/Exercise Parameters   Knee extension ROM    Quad setting    Knee flexion ROM    Gait training 10 minutes   Weight shifting    Heel slides     Prone Hang    Calf stretch    Hip abduction    Hip extension    Mini squat    Shuttle (4 bands)    bike 5 minutes   TKE Blue x 20 reps Manual Therapy (     45 minutes): Manual techniques to facilitate improved motion and decreased pain. (Used abbreviations: MET - muscle energy technique; PNF - proprioceptive neuromuscular facilitation; NMR - neuromuscular re-education; a/p - anterior to posterior; p/a - posterior to anterior)   · Joint mobilization: L patellofemoral all directions grade III-IV, shearing ant-post, post-ant grade III-IV, seated flexion with anterior tibial glide  · Soft tissue mobilization: L quadriceps, hamstrings and calf musculature. Cupping to anterior and posterior knee        [facility] Portal  Treatment/Session Summary:    · Response to Treatment:  Pt continues to lack terminal knee extension that increases gait devations with decreased heel strike. .  · Communication/Consultation:  None today  · Equipment provided today:  None today  · Recommendations/Intent for next treatment session: Next visit will focus on ROM and quadriceps activation.      Total Treatment Billable Duration:  55 minutes total time  PT Patient Time In/Time Out  Time In: 1430  Time Out: [address] [place], PT    Future Appointments   Date Time Provider Melody Puentes   5/18/2021  8:30 AM Gita Whitehead MD [facility]   5/18/2021  4:30 PM Deanne [place], PT [facility]   5/19/2021  2:30 PM Deanne Nicola, PT [facility]   5/20/2021  4:30 PM Deanne Nicola, PT [facility]   5/25/2021  2:30 PM Deanne Nicola, PT [facility]   5/26/2021  2:30 PM Deanne [place], PT [facility]   5/27/2021  3:30 PM Deanne [place], PT [facility]   6/2/2021  2:30 PM Deanne Nicola, PT [facility]   6/3/2021  2:30 PM Deanne [place], PT [facility]   6/7/2021  2:30 PM Deanne [place], PT [facility]   6/10/2021  2:30 PM Deanne Nicola, PT [facility]   6/15/2021  2:30 PM Deanne Nicola, PT [facility]   6/17/2021  2:30 PM Deanne [place], PT [facility]   6/22/2021  2:30 PM Deanne [place], PT [facility] 6/24/2021  2:30 PM Lorena Hernandez, PT [facility]   6/29/2021  2:30 PM Lorena Hernandez, PT [facility]   7/1/2021  2:30 PM Lorena Hernandez, PT [facility]   7/12/2021  4:00 PM Tasha Denis MD [facility]

## 2021-05-17 PROBLEM — I82.4Z2 ACUTE DEEP VEIN THROMBOSIS (DVT) OF DISTAL VEIN OF LEFT LOWER EXTREMITY (HCC): Status: ACTIVE | Noted: 2021-05-06

## 2021-05-18 ENCOUNTER — HOSPITAL ENCOUNTER (OUTPATIENT)
Dept: PHYSICAL THERAPY | Age: 41
Discharge: HOME | End: 2021-05-18
Payer: COMMERCIAL

## 2021-05-18 PROCEDURE — 97110 THERAPEUTIC EXERCISES: CPT

## 2021-05-18 PROCEDURE — 97140 MANUAL THERAPY 1/> REGIONS: CPT

## 2021-05-18 NOTE — PROGRESS NOTES
Hugo Roldan  : 1980  Primary: [employer]  Secondary:  Therapy Center at [address], [address] [place] [address]  [phone]   [phone]        OUTPATIENT PHYSICAL THERAPY: Daily Treatment Note 2021  Visit Count:  8    ICD-10: Treatment Diagnosis: Pain in Joint, L knee [M25.562]; Difficulty Walking, Not Elsewhere Classified [R26.2]; Muscle Wasting and Atrophy, Not Elsewhere Classified, L thigh [M62.552]  Precautions/Allergies:   Sulfa (sulfonamide antibiotics) and Adhesive   TREATMENT PLAN:  Effective Dates: 2021 TO 2021 (90 days). Frequency/Duration: 2 times a week for 90 Day(s) MEDICAL/REFERRING DIAGNOSIS:  Rupture of anterior cruciate ligament of left knee, initial encounter [S83.512A]  Acute medial meniscus tear, left, initial encounter [A12.334K]   DATE OF ONSET: 3/31/21  REFERRING PHYSICIAN: Yandy Calhoun MD MD Orders: Evaluation and Treat, Per Protocol; 2 x a week for 12 weeks  Return MD Appointment: 21       Pre-treatment Symptoms/Complaints:  Pt. Notes good f/u with referring MD today. Pt. Denies new complaints. .    Pain: Initial: Pain Intensity 1: 5 /10 Post Session:  4/10   Medications Last Reviewed:  2021  Updated Objective Findings:  L knee flexion 120 degrees, extension (-4) degrees  TREATMENT:     Therapeutic Exercise: (40 Minutes):  Exercises per grid below to improve mobility, strength, balance and coordination. Required moderate visual, verbal and manual cues to promote proper body alignment, promote proper body posture and promote proper body mechanics. Progressed resistance, range and repetitions as indicated.      Date:  2021   Activity/Exercise Parameters   Knee extension ROM 5 minutes   Quad setting 3 x 20   Knee flexion ROM 3 x 10   Gait training 5 minutes   Weight shifting    Heel slides     Prone Hang 5 minutes   Calf stretch 4 minutes   Hip abduction 3 x 10   Hip extension    Mini squat    Shuttle (4 bands)    bike 5 minutes   TKE Blue x 20 reps               Manual Therapy (    Soft Tissue Mobilization Duration  Duration: 10 Minutes): Manual techniques to facilitate improved motion and decreased pain. (Used abbreviations: MET - muscle energy technique; PNF - proprioceptive neuromuscular facilitation; NMR - neuromuscular re-education; a/p - anterior to posterior; p/a - posterior to anterior)   · Joint mobilization: L patellofemoral all directions grade III-IV, shearing ant-post, post-ant grade III-IV, seated flexion with anterior tibial glide  · Soft tissue mobilization: L quadriceps, hamstrings and calf musculature. [facility] Portal  Treatment/Session Summary:    · Response to Treatment:  Neuromuscular activation with L quadriceps set improves this week. Pt. will benefit from continued knee extension ROM exercises and quadriceps activation. .  · Communication/Consultation:  None today  · Equipment provided today:  None today  · Recommendations/Intent for next treatment session: Next visit will focus on ROM and quadriceps activation.      Total Treatment Billable Duration:  55 minutes total time  PT Patient Time In/Time Out  Time In: 1630  Time Out: 40 [facility], PT    Future Appointments   Date Time Provider Melody Puenets   5/19/2021  2:30 PM Nelta Breeze, PT [facility]   5/20/2021  4:30 PM Nelta Breeze, PT [facility]   5/25/2021  2:30 PM Nelta Breeze, PT [facility]   5/26/2021  2:30 PM Nelta Breeze, PT [facility]   5/27/2021  3:30 PM Nelta Breeze, PT [facility]   6/2/2021  2:30 PM Nelta Breeze, PT [facility]   6/3/2021  2:30 PM Nelta Breeze, PT [facility]   6/7/2021  2:30 PM Nelta Breeze, PT [facility]   6/10/2021  2:30 PM Nelta Breeze, PT [facility]   6/15/2021  2:30 PM Nelta Breeze, PT [facility]   6/17/2021  2:30 PM Nelta Breeze, PT [facility]   6/22/2021  2:30 PM Nelta Breeze, PT [facility]   6/24/2021 2:30 PM Watson Newsome, PT [facility]   6/29/2021  9:00 AM Demond Zelaya MD [facility]   6/29/2021  2:30 PM Watson Newsome, PT [facility]   7/1/2021  2:30 PM Watson Newsome, PT [facility]   7/12/2021  4:00 PM Opal Harding MD [facility]

## 2021-05-19 ENCOUNTER — HOSPITAL ENCOUNTER (OUTPATIENT)
Dept: PHYSICAL THERAPY | Age: 41
Discharge: HOME | End: 2021-05-19
Payer: COMMERCIAL

## 2021-05-19 PROCEDURE — 97140 MANUAL THERAPY 1/> REGIONS: CPT

## 2021-05-19 PROCEDURE — 97110 THERAPEUTIC EXERCISES: CPT

## 2021-05-19 NOTE — PROGRESS NOTES
Hugo Roldan  : 1980  Primary: [employer]  Secondary:  Therapy Center at [address], [address] [place] [address]  [phone]   [phone]        OUTPATIENT PHYSICAL THERAPY: Daily Treatment Note 2021  Visit Count:  9    ICD-10: Treatment Diagnosis: Pain in Joint, L knee [M25.562]; Difficulty Walking, Not Elsewhere Classified [R26.2]; Muscle Wasting and Atrophy, Not Elsewhere Classified, L thigh [M62.552]  Precautions/Allergies:   Sulfa (sulfonamide antibiotics) and Adhesive   TREATMENT PLAN:  Effective Dates: 2021 TO 2021 (90 days). Frequency/Duration: 2 times a week for 90 Day(s) MEDICAL/REFERRING DIAGNOSIS:  Rupture of anterior cruciate ligament of left knee, initial encounter [S83.512A]  Acute medial meniscus tear, left, initial encounter [S83.242A]   DATE OF ONSET: 3/31/21  REFERRING PHYSICIAN: Jenna Hugo MD MD Orders: Evaluation and Treat, Per Protocol; 2 x a week for 12 weeks  Return MD Appointment: 21       Pre-treatment Symptoms/Complaints:  Pt. Reports L knee ache while trying to sleep last night. Pain: Initial: Pain Intensity 1: 5 /10 Post Session:  4/10   Medications Last Reviewed:  2021  Updated Objective Findings:  None Today  TREATMENT:     Therapeutic Exercise: (40 Minutes):  Exercises per grid below to improve mobility, strength, balance and coordination. Required moderate visual, verbal and manual cues to promote proper body alignment, promote proper body posture and promote proper body mechanics. Progressed resistance, range and repetitions as indicated.      Date:  2021   Activity/Exercise Parameters   Knee extension ROM 15 minutes   Quad setting 3 x 20   Knee flexion ROM 3 x 10   Gait training 5 minutes   Weight shifting    Heel slides     Prone Hang --   Calf stretch 4 minutes   Hip abduction    Hip extension    Mini squat 3 x 10   Shuttle (4 bands)    bike 5 minutes   TKE Blue x 20 reps Manual Therapy (    Soft Tissue Mobilization Duration  Duration: 15 Minutes): Manual techniques to facilitate improved motion and decreased pain. (Used abbreviations: MET - muscle energy technique; PNF - proprioceptive neuromuscular facilitation; NMR - neuromuscular re-education; a/p - anterior to posterior; p/a - posterior to anterior)   · Joint mobilization: L patellofemoral all directions grade III-IV, shearing ant-post, post-ant grade III-IV, seated flexion with anterior tibial glide  · Soft tissue mobilization: L quadriceps, hamstrings and calf musculature. [facility] Portal  Treatment/Session Summary:    · Response to Treatment:  Slow improvements in L knee extension are evident this week. Pt. does continue to improve with quadriceps setting activation. Pt. continues to require cueing for appropriate gait performance during stance phase of the L LE. .  · Communication/Consultation:  None today  · Equipment provided today:  None today  · Recommendations/Intent for next treatment session: Next visit will focus on ROM and quadriceps activation.      Total Treatment Billable Duration:  55 minutes total time  PT Patient Time In/Time Out  Time In: 1430  Time Out: 315 Jimmy Luis, PT    Future Appointments   Date Time Provider Melody Puentes   5/20/2021  4:30 PM Geraldo Loveless, PT [facility]   5/25/2021  2:30 PM Geraldo Loveless, PT [facility]   5/26/2021  2:30 PM Geraldo Loveless, PT [facility]   5/27/2021  3:30 PM [place] Loveless, PT [facility]   6/2/2021  2:30 PM Geraldo Loveless, PT [facility]   6/3/2021  2:30 PM Geraldo Loveless, PT [facility]   6/7/2021  2:30 PM [place] Loveless, PT [facility]   6/10/2021  2:30 PM Geraldo Loveless, PT [facility]   6/15/2021  2:30 PM [place] Loveless, PT [facility]   6/17/2021  2:30 PM Geraldo Loveless, PT [facility]   6/22/2021  2:30 PM Geraldo Loveless, PT [facility]   6/24/2021  2:30 PM [place] Loveless, PT [facility] 6/29/2021  9:00 AM Shagufta Crowe MD [facility]   6/29/2021  2:30 PM Ollis Left, PT [facility]   7/1/2021  2:30 PM Brodie Left, PT [facility]   7/12/2021  4:00 PM Demetrius Wagner MD [facility]

## 2021-05-20 ENCOUNTER — HOSPITAL ENCOUNTER (OUTPATIENT)
Dept: PHYSICAL THERAPY | Age: 41
Discharge: HOME | End: 2021-05-20
Payer: COMMERCIAL

## 2021-05-20 PROCEDURE — 97110 THERAPEUTIC EXERCISES: CPT

## 2021-05-20 PROCEDURE — 97140 MANUAL THERAPY 1/> REGIONS: CPT

## 2021-05-20 NOTE — PROGRESS NOTES
Hugo Roldan  : 1980  Primary: [employer]  Secondary:  Therapy Center at [address]  [phone]   [phone]        OUTPATIENT PHYSICAL THERAPY: Daily Treatment Note 2021  Visit Count:  10    ICD-10: Treatment Diagnosis: Pain in Joint, L knee [M25.562]; Difficulty Walking, Not Elsewhere Classified [R26.2]; Muscle Wasting and Atrophy, Not Elsewhere Classified, L thigh [M62.552]  Precautions/Allergies:   Sulfa (sulfonamide antibiotics) and Adhesive   TREATMENT PLAN:  Effective Dates: 2021 TO 2021 (90 days). Frequency/Duration: 2 times a week for 90 Day(s) MEDICAL/REFERRING DIAGNOSIS:  Rupture of anterior cruciate ligament of left knee, initial encounter [S83.512A]  Acute medial meniscus tear, left, initial encounter [Y69.088P]   DATE OF ONSET: 3/31/21  REFERRING PHYSICIAN: Lizabeth Foreman MD MD Orders: Evaluation and Treat, Per Protocol; 2 x a week for 12 weeks  Return MD Appointment: 21       Pre-treatment Symptoms/Complaints:  Pt. Notes increased anterior knee soreness and posterior knee soreness today. Pain: Initial: Pain Intensity 1: 5 /10 Post Session:  4/10   Medications Last Reviewed:  2021  Updated Objective Findings:  L knee extension (-4) degrees today. mild increase in swelling around the knee today. TREATMENT:     Therapeutic Exercise: (30 Minutes):  Exercises per grid below to improve mobility, strength, balance and coordination. Required moderate visual, verbal and manual cues to promote proper body alignment, promote proper body posture and promote proper body mechanics. Progressed resistance, range and repetitions as indicated.      Date:  2021   Activity/Exercise Parameters   Knee extension ROM 10 minutes   Quad setting 3 x 15   Knee flexion ROM 3 x 10   Gait training --   Weight shifting    Heel slides     Prone Hang --   Calf stretch 5 minutes   Hip abduction 3 x 10   clams 3 x 15   Mini squat --   Shuttle (4 bands) --   bike --   [place] x 20 reps               Manual Therapy (    Soft Tissue Mobilization Duration  Duration: 15 Minutes): Manual techniques to facilitate improved motion and decreased pain. (Used abbreviations: MET - muscle energy technique; PNF - proprioceptive neuromuscular facilitation; NMR - neuromuscular re-education; a/p - anterior to posterior; p/a - posterior to anterior)   · Joint mobilization: L patellofemoral all directions grade III-IV, shearing ant-post, post-ant grade III-IV, seated flexion with anterior tibial glide  · Soft tissue mobilization: L quadriceps, hamstrings and calf musculature. Game ready: 15 minutes     [employer] Portal  Treatment/Session Summary:    · Response to Treatment:  Duration of ROM exercises and strengthening are reduced today secondary to swelling. Pt. is advised to continue ROM exercises aggressively over the weekend and continue icing regularly at home. .  · Communication/Consultation:  None today  · Equipment provided today:  None today  · Recommendations/Intent for next treatment session: Next visit will focus on ROM and quadriceps activation.      Total Treatment Billable Duration:  45 minutes total time  PT Patient Time In/Time Out  Time In: 1630  Time Out: 40 [facility], PT    Future Appointments   Date Time Provider Melody Puentes   5/25/2021  2:30 PM [place] Pierini, PT [facility]   5/26/2021  2:30 PM [place] Pierini, PT [facility]   5/27/2021  3:30 PM [place] Pierini, PT [facility]   6/2/2021  2:30 PM [place] Pierini, PT [facility]   6/3/2021  2:30 PM [place] Pierini, PT [facility]   6/7/2021  2:30 PM [place] Pierini, PT [facility]   6/10/2021  2:30 PM [place] Pierini, PT [facility]   6/15/2021  2:30 PM [place] Pierini, PT [facility]   6/17/2021  2:30 PM [place] Pierini, PT [facility]   6/22/2021  2:30 PM [place] Pierini, PT [facility]   6/24/2021  2:30 PM [place] [facility]   6/29/2021  9:00 AM Gita Whitehead MD [facility]   6/29/2021  2:30 PM [facility]   7/1/2021  2:30 PM [facility]   7/12/2021  4:00 PM Roverto Payan MD [facility]

## 2021-05-25 ENCOUNTER — HOSPITAL ENCOUNTER (OUTPATIENT)
Dept: PHYSICAL THERAPY | Age: 41
Discharge: HOME | End: 2021-05-25
Payer: COMMERCIAL

## 2021-05-25 PROCEDURE — 97140 MANUAL THERAPY 1/> REGIONS: CPT

## 2021-05-25 PROCEDURE — 97110 THERAPEUTIC EXERCISES: CPT

## 2021-05-25 NOTE — PROGRESS NOTES
Hugo Roldan  : 1980  Primary: [employer]  Secondary:  Therapy Center at [address]  [phone]   [phone]        OUTPATIENT PHYSICAL THERAPY: Daily Treatment Note 2021  Visit Count:  11    ICD-10: Treatment Diagnosis: Pain in Joint, L knee [M25.562]; Difficulty Walking, Not Elsewhere Classified [R26.2]; Muscle Wasting and Atrophy, Not Elsewhere Classified, L thigh [M62.552]  Precautions/Allergies:   Sulfa (sulfonamide antibiotics) and Adhesive   TREATMENT PLAN:  Effective Dates: 2021 TO 2021 (90 days). Frequency/Duration: 2 times a week for 90 Day(s) MEDICAL/REFERRING DIAGNOSIS:  Rupture of anterior cruciate ligament of left knee, initial encounter [S83.512A]  Acute medial meniscus tear, left, initial encounter [S83.242A]   DATE OF ONSET: 3/31/21  REFERRING PHYSICIAN: Zakiya Rojo MD MD Orders: Evaluation and Treat, Per Protocol; 2 x a week for 12 weeks  Return MD Appointment: 21       Pre-treatment Symptoms/Complaints:  Pt. Reports continued discomfort in the back of the knee and the anterior knee. Pain: Initial: Pain Intensity 1: 5 /10 Post Session:  4/10   Medications Last Reviewed:  2021  Updated Objective Findings:  None Today  TREATMENT:     Therapeutic Exercise: (40 Minutes):  Exercises per grid below to improve mobility, strength, balance and coordination. Required moderate visual, verbal and manual cues to promote proper body alignment, promote proper body posture and promote proper body mechanics. Progressed resistance, range and repetitions as indicated.      Date:  2021   Activity/Exercise Parameters   Knee extension ROM 10 minutes   Quad setting 3 x 15   Knee flexion ROM --   Gait training 5 minutes   Weight shifting    Heel slides     Prone Hang --   Calf stretch 3 minutes   Hip abduction --   clams --   Mini squat 4 x 15   Shuttle (6 bands) 3 x 15   bike 5 minutes   TKE -- Lateral walking 4  inutes   Calf raise 3 x 10       Manual Therapy (    Soft Tissue Mobilization Duration  Duration: 15 Minutes): Manual techniques to facilitate improved motion and decreased pain. (Used abbreviations: MET - muscle energy technique; PNF - proprioceptive neuromuscular facilitation; NMR - neuromuscular re-education; a/p - anterior to posterior; p/a - posterior to anterior)   · Joint mobilization: L patellofemoral all directions grade III-IV, shearing ant-post, post-ant grade III-IV, seated flexion with anterior tibial glide  · Soft tissue mobilization: L quadriceps, hamstrings and calf musculature. Game ready: 15 minutes (NOT PERFORMED)     [employer] Portal  Treatment/Session Summary:    · Response to Treatment:  Pt. is advanced with L LE strengthening exercises today. Muscular strength and endurance is limited as expected. Pt. continues to demonstrate limited L knee extension ROM. Delsa Severance · Communication/Consultation:  None today  · Equipment provided today:  None today  · Recommendations/Intent for next treatment session: Next visit will focus on ROM and quadriceps activation.      Total Treatment Billable Duration:  55 minutes total time  PT Patient Time In/Time Out  Time In: 1430  Time Out: 315 Jimmy Luis, [facility]    Future Appointments   Date Time Provider Melody Puentes   5/26/2021  2:30 PM Tad Hait, PT [facility]   5/27/2021  3:30 PM Tad Hait, PT [facility]   6/2/2021  2:30 PM Tad Hait, PT [facility]   6/3/2021  2:30 PM Tad Hait, PT [facility]   6/7/2021  2:30 PM Tad Hait, PT [facility]   6/10/2021  2:30 PM Tad Hait, PT [facility]   6/15/2021  2:30 PM Tad Hait, PT [facility]   6/17/2021  2:30 PM Tad Hait, PT [facility]   6/22/2021  2:30 PM Tad Hait, PT [facility]   6/24/2021  2:30 PM Tad Hait, PT [facility]   6/29/2021  9:00 AM Sotero Rashid MD [facility]   6/29/2021  2:30 PM Cee Wilder Choco Murray, PT [facility]   7/1/2021  2:30 PM MELBA [facility]   7/12/2021  4:00 PM Harry Chris MD [facility]

## 2021-05-26 ENCOUNTER — HOSPITAL ENCOUNTER (OUTPATIENT)
Dept: PHYSICAL THERAPY | Age: 41
Discharge: HOME | End: 2021-05-26
Payer: COMMERCIAL

## 2021-05-26 PROCEDURE — 97140 MANUAL THERAPY 1/> REGIONS: CPT

## 2021-05-26 PROCEDURE — 97110 THERAPEUTIC EXERCISES: CPT

## 2021-05-26 NOTE — PROGRESS NOTES
Hugo Roldan  : 1980  Primary: [employer]  Secondary:  Therapy Center at [address]  [phone]   [phone]        OUTPATIENT PHYSICAL THERAPY: Daily Treatment Note 2021  Visit Count:  12    ICD-10: Treatment Diagnosis: Pain in Joint, L knee [M25.562]; Difficulty Walking, Not Elsewhere Classified [R26.2]; Muscle Wasting and Atrophy, Not Elsewhere Classified, L thigh [M62.552]  Precautions/Allergies:   Sulfa (sulfonamide antibiotics) and Adhesive   TREATMENT PLAN:  Effective Dates: 2021 TO 2021 (90 days). Frequency/Duration: 2 times a week for 90 Day(s) MEDICAL/REFERRING DIAGNOSIS:  Rupture of anterior cruciate ligament of left knee, initial encounter [S83.512A]  Acute medial meniscus tear, left, initial encounter [C13.355V]   DATE OF ONSET: 3/31/21  REFERRING PHYSICIAN: Cindy Hammonds MD MD Orders: Evaluation and Treat, Per Protocol; 2 x a week for 12 weeks  Return MD Appointment: 21       Pre-treatment Symptoms/Complaints:  Pt. Denies new complaints today. Pain: Initial: Pain Intensity 1: 10 Post Session:  4/10   Medications Last Reviewed:  2021  Updated Objective Findings:  gait observation: reduced stride length on the L LE secondary to knee extension ROM limitations. TREATMENT:     Therapeutic Exercise: (40 Minutes):  Exercises per grid below to improve mobility, strength, balance and coordination. Required moderate visual, verbal and manual cues to promote proper body alignment, promote proper body posture and promote proper body mechanics. Progressed resistance, range and repetitions as indicated.      Date:  2021   Activity/Exercise Parameters   Knee extension ROM 10 minutes   Quad setting (supine/standing) 10 minutes   Knee flexion ROM --   Gait training 5 minutes   Weight shifting    Heel slides     Prone Hang --   Calf stretch 3 minutes   Hip abduction --   clams --   Mini squat -- Shuttle (6 bands) --   bike 5 minutes   TKE 2 minutes   Lateral walking --   Calf raise --   Front and side planks 5 minutes   Manual Therapy (    Soft Tissue Mobilization Duration  Duration: 15 Minutes): Manual techniques to facilitate improved motion and decreased pain. (Used abbreviations: MET - muscle energy technique; PNF - proprioceptive neuromuscular facilitation; NMR - neuromuscular re-education; a/p - anterior to posterior; p/a - posterior to anterior)   · Joint mobilization: L patellofemoral all directions grade III-IV, shearing ant-post, post-ant grade III-IV, seated flexion with anterior tibial glide  · Soft tissue mobilization: L quadriceps, hamstrings and calf musculature. Game ready: 15 minutes (NOT PERFORMED)     [employer] Portal  Treatment/Session Summary:    · Response to Treatment:  Pt. demonstrates improved L knee AROM into L knee extension this week with quadriceps setting. Knee extension restriction remains and will continues to be focus of rehabilitation at this time. .  · Communication/Consultation:  None today  · Equipment provided today:  None today  · Recommendations/Intent for next treatment session: Next visit will focus on ROM and quadriceps activation.      Total Treatment Billable Duration:  55 minutes total time  PT Patient Time In/Time Out  Time In: 1430  Time Out: 315 Jimmy Luis, PT    Future Appointments   Date Time Provider Melody Puentes   5/27/2021  3:30 PM Nelta Breeze, PT [facility]   6/2/2021  2:30 PM Nelta Breeze, PT [facility]   6/3/2021  2:30 PM Nelta Breeze, PT [facility]   6/7/2021  2:30 PM Nelta Breeze, PT [facility]   6/10/2021  2:30 PM Nelta Breeze, PT [facility]   6/15/2021  2:30 PM Nelta Breeze, PT [facility]   6/17/2021  2:30 PM Nelta Breeze, PT [facility]   6/22/2021  2:30 PM Nelta Breeze, PT [facility]   6/24/2021  2:30 PM Nelta Breeze, PT [facility]   6/29/2021  9:00 AM Ml Almendarez MD [facility]   6/29/2021  2:30 PM Dolores Calderon, PT [facility]   7/1/2021  2:30 [facility]   7/12/2021  4:00 PM Thomas Avitia MD [facility]

## 2021-05-27 ENCOUNTER — HOSPITAL ENCOUNTER (OUTPATIENT)
Dept: PHYSICAL THERAPY | Age: 41
Discharge: HOME | End: 2021-05-27
Payer: COMMERCIAL

## 2021-05-27 PROCEDURE — 97110 THERAPEUTIC EXERCISES: CPT

## 2021-05-27 PROCEDURE — 97140 MANUAL THERAPY 1/> REGIONS: CPT

## 2021-05-27 NOTE — PROGRESS NOTES
Hugo Roldan  : 1980  Primary: [employer]  Secondary:  Therapy Center at [address], [address] [place] [address]  [phone]   [phone]        OUTPATIENT PHYSICAL THERAPY: Daily Treatment Note 2021  Visit Count:  13    ICD-10: Treatment Diagnosis: Pain in Joint, L knee [M25.562]; Difficulty Walking, Not Elsewhere Classified [R26.2]; Muscle Wasting and Atrophy, Not Elsewhere Classified, L thigh [M62.552]  Precautions/Allergies:   Sulfa (sulfonamide antibiotics) and Adhesive   TREATMENT PLAN:  Effective Dates: 2021 TO 2021 (90 days). Frequency/Duration: 2 times a week for 90 Day(s) MEDICAL/REFERRING DIAGNOSIS:  Rupture of anterior cruciate ligament of left knee, initial encounter [S83.512A]  Acute medial meniscus tear, left, initial encounter [I35.038A]   DATE OF ONSET: 3/31/21  REFERRING PHYSICIAN: Lashon Arzola MD MD Orders: Evaluation and Treat, Per Protocol; 2 x a week for 12 weeks  Return MD Appointment: 21       Pre-treatment Symptoms/Complaints:  Pt. Reports good tolerance to previous PT session. Pain: Initial: Pain Intensity 1: 5 /10 Post Session:  4/10   Medications Last Reviewed:  2021  Updated Objective Findings:  L knee extension to (-3) degrees today post treatment. TREATMENT:     Therapeutic Exercise: (40 Minutes):  Exercises per grid below to improve mobility, strength, balance and coordination. Required moderate visual, verbal and manual cues to promote proper body alignment, promote proper body posture and promote proper body mechanics. Progressed resistance, range and repetitions as indicated.      Date:  2021   Activity/Exercise Parameters   Knee extension ROM 10 minutes   Quad setting (supine/standing) 10 minutes   Knee flexion ROM --   Gait training --   Stars 2 minutes   Heel slides     Prone Hang --   Calf stretch 3 minutes   Hip abduction --   clams --   Mini squat --   Shuttle (6 bands) 4 x 10 bike 5 minutes   TKE 2 minutes   Lateral walking 4 minutes   Calf raise --   Front and side planks --   Manual Therapy (    Soft Tissue Mobilization Duration  Duration: 15 Minutes): Manual techniques to facilitate improved motion and decreased pain. (Used abbreviations: MET - muscle energy technique; PNF - proprioceptive neuromuscular facilitation; NMR - neuromuscular re-education; a/p - anterior to posterior; p/a - posterior to anterior)   · Joint mobilization: L patellofemoral all directions grade III-IV, shearing ant-post, post-ant grade III-IV, seated flexion with anterior tibial glide  · Soft tissue mobilization: L quadriceps, hamstrings and calf musculature. Game ready: 15 minutes (NOT PERFORMED)     [employer] Portal  Treatment/Session Summary:    · Response to Treatment:  L knee extension ROM improvements are slow but progressive with continued PT.  Pt. will benefit from gradual progression of L quadriceps strengthening as tolerated. .  · Communication/Consultation:  None today  · Equipment provided today:  None today  · Recommendations/Intent for next treatment session: Next visit will focus on ROM and quadriceps activation.      Total Treatment Billable Duration:  55 minutes total time  PT Patient Time In/Time Out  Time In: 9572  Time Out: Elio Fishman, PT    Future Appointments   Date Time Provider Melody Puentes   6/2/2021  2:30 PM Oley Odilia, PT [facility]   6/3/2021  2:30 PM Oley Odilia, PT [facility]   6/7/2021  2:30 PM Oley Odilia, PT [facility]   6/10/2021  2:30 PM Oley Odilia, PT [facility]   6/15/2021  2:30 PM Oley Odilia, PT [facility]   6/17/2021  2:30 PM Oley Odilia, PT [facility]   6/22/2021  2:30 PM Oley Odilia, PT [facility]   6/24/2021  2:30 PM Oley Odilia, PT [facility]   6/29/2021  9:00 AM Mary Turcios MD [facility]   6/29/2021  2:30 PM Oley Odilia, PT [facility]   7/1/2021  2:30 PM Oley Odilia, PT [facility]   7/12/2021  4:00 PM Mack Daniels MD [facility]

## 2021-06-02 ENCOUNTER — HOSPITAL ENCOUNTER (OUTPATIENT)
Dept: PHYSICAL THERAPY | Age: 41
Discharge: HOME | End: 2021-06-02
Payer: COMMERCIAL

## 2021-06-02 PROCEDURE — 97110 THERAPEUTIC EXERCISES: CPT

## 2021-06-02 PROCEDURE — 97140 MANUAL THERAPY 1/> REGIONS: CPT

## 2021-06-02 NOTE — PROGRESS NOTES
Hugo Roldan  : 1980  Primary: [employer]  Secondary:  [facility] at [address], [address] [place] [address]  [phone]   [phone]        OUTPATIENT PHYSICAL THERAPY: Daily Treatment Note 2021  Visit Count:  14    ICD-10: Treatment Diagnosis: Pain in Joint, L knee [M25.562]; Difficulty Walking, Not Elsewhere Classified [R26.2]; Muscle Wasting and Atrophy, Not Elsewhere Classified, L thigh [M62.552]  Precautions/Allergies:   Sulfa (sulfonamide antibiotics) and Adhesive   TREATMENT PLAN:  Effective Dates: 2021 TO 2021 (90 days). Frequency/Duration: 2 times a week for 90 Day(s) MEDICAL/REFERRING DIAGNOSIS:  Rupture of anterior cruciate ligament of left knee, initial encounter [S83.512A]  Acute medial meniscus tear, left, initial encounter [X41.957R]   DATE OF ONSET: 3/31/21  REFERRING PHYSICIAN: Aliya Ruffin MD MD Orders: Evaluation and Treat, Per Protocol; 2 x a week for 12 weeks  Return MD Appointment: 21       Pre-treatment Symptoms/Complaints:  Pt. Notes L anterior knee pain is slightly better this week. Pain: Initial: Pain Intensity 1: 4 /10 Post Session:  4/10   Medications Last Reviewed:  2021  Updated Objective Findings:  None Today  TREATMENT:     Therapeutic Exercise: (40 Minutes):  Exercises per grid below to improve mobility, strength, balance and coordination. Required moderate visual, verbal and manual cues to promote proper body alignment, promote proper body posture and promote proper body mechanics. Progressed resistance, range and repetitions as indicated.      Date:  2021   Activity/Exercise Parameters   Knee extension ROM 10 minutes   Quad setting (supine/standing) 4 minutes   Knee flexion ROM --   Gait training --   Stars --   Heel slides     SLR 3 x 10   Calf stretch 3 minutes   Hip abduction 3 x 10   clams 3 x 15   Mini squat 3 x 10   Shuttle (6 bands) 3 x 10   bike 5 minutes   TKE (purple band) 2 minutes   Lateral walking --   Calf raise --   Front and side planks --   Manual Therapy (    Soft Tissue Mobilization Duration  Duration: 15 Minutes): Manual techniques to facilitate improved motion and decreased pain. (Used abbreviations: MET - muscle energy technique; PNF - proprioceptive neuromuscular facilitation; NMR - neuromuscular re-education; a/p - anterior to posterior; p/a - posterior to anterior)   · Joint mobilization: L patellofemoral all directions grade III-IV, shearing ant-post, post-ant grade III-IV, seated flexion with anterior tibial glide  · Soft tissue mobilization: L quadriceps, hamstrings and calf musculature. Game ready: 15 minutes (NOT PERFORMED)     [employer]  Treatment/Session Summary:    · Response to Treatment:  Pt. tolerates L LE strengthening today without complaints. L knee extension deficit remains but is slightly improved this week. .  · Communication/Consultation:  None today  · Equipment provided today:  None today  · Recommendations/Intent for next treatment session: Next visit will focus on ROM and quadriceps activation.      Total Treatment Billable Duration:  55 minutes total time  PT Patient Time In/Time Out  Time In: 1430  Time Out: 315 Jimmy Luis, PT    Future Appointments   Date Time Provider Melody Puentes   6/3/2021  2:30 PM Ollis Left, PT [facility]   6/7/2021  7:00 PM Ollis Left, PT [facility]   6/10/2021  7:00 PM Ollis Left, PT [facility]   6/15/2021  2:30 PM Ollis Left, PT [facility]   6/17/2021  2:30 PM Ollis Left, PT [facility]   6/22/2021  2:30 PM Ollis Left, PT [facility]   6/24/2021  2:30 PM Ollis Left, PT [facility]   6/29/2021  9:00 AM Shagufta Crowe MD [facility]   6/29/2021  2:30 PM Ollis Left, PT [facility]   7/1/2021  2:30 PM Ollis Left, PT [facility]   7/12/2021  4:00 PM [facility]etrius Wagner [facility]

## 2021-06-03 ENCOUNTER — HOSPITAL ENCOUNTER (OUTPATIENT)
Dept: PHYSICAL THERAPY | Age: 41
Discharge: HOME | End: 2021-06-03
Payer: COMMERCIAL

## 2021-06-03 PROCEDURE — 97140 MANUAL THERAPY 1/> REGIONS: CPT

## 2021-06-03 PROCEDURE — 97110 THERAPEUTIC EXERCISES: CPT

## 2021-06-03 NOTE — PROGRESS NOTES
Hugo Roldan  : 1980  Primary: [employer]  Secondary:  [facility] at [address], [address] [place] [address]  [phone]   [phone]        OUTPATIENT PHYSICAL THERAPY: Daily Treatment Note 6/3/2021  Visit Count:  15    ICD-10: Treatment Diagnosis: Pain in Joint, L knee [M25.562]; Difficulty Walking, Not Elsewhere Classified [R26.2]; Muscle Wasting and Atrophy, Not Elsewhere Classified, L thigh [M62.552]  Precautions/Allergies:   Sulfa (sulfonamide antibiotics) and Adhesive   TREATMENT PLAN:  Effective Dates: 2021 TO 2021 (90 days). Frequency/Duration: 2 times a week for 90 Day(s) MEDICAL/REFERRING DIAGNOSIS:  Rupture of anterior cruciate ligament of left knee, initial encounter [S83.512A]  Acute medial meniscus tear, left, initial encounter [A93.705D]   DATE OF ONSET: 3/31/21  REFERRING PHYSICIAN: Purnima Eisenberg MD MD Orders: Evaluation and Treat, Per Protocol; 2 x a week for 12 weeks  Return MD Appointment: 21       Pre-treatment Symptoms/Complaints:  Pt. Reports good tolerance to previous PT session. Pt. Will be out of town all next week on vacation. Pain: Initial: Pain Intensity 1: 4 10 Post Session:  4/10   Medications Last Reviewed:  6/3/2021  Updated Objective Findings:  See evaluation note from today  TREATMENT:     Therapeutic Exercise: (45 Minutes):  Exercises per grid below to improve mobility, strength, balance and coordination. Required moderate visual, verbal and manual cues to promote proper body alignment, promote proper body posture and promote proper body mechanics. Progressed resistance, range and repetitions as indicated.      Date:  6/3/2021   Activity/Exercise Parameters   Knee extension ROM 10 minutes   Quad setting (supine/standing) 4 minutes   Knee flexion ROM --   Gait training 3 minutes   Stars --   Heel slides     SLR 3 x 10   Calf stretch 3 minutes   Hip abduction 3 x 10   clams --   Mini squat 3 x 10 Shuttle (6 bands) --   bike 5 minutes   TKE (purple band) --   Lateral walking 4 minutes   Calf raise --   Front and side planks --   Single LE balance 2 minutes   Manual Therapy (    Soft Tissue Mobilization Duration  Duration: 10 Minutes): Manual techniques to facilitate improved motion and decreased pain. (Used abbreviations: MET - muscle energy technique; PNF - proprioceptive neuromuscular facilitation; NMR - neuromuscular re-education; a/p - anterior to posterior; p/a - posterior to anterior)   · Joint mobilization: L patellofemoral all directions grade III-IV, shearing ant-post, post-ant grade III-IV, seated flexion with anterior tibial glide  · Soft tissue mobilization: L quadriceps, hamstrings and calf musculature. Game ready: 15 minutes (NOT PERFORMED)     [employer]  Treatment/Session Summary:    · Response to Treatment:  Pt. continues to make steady improvement with L knee extension ROM and neuromuscular control of the L quadriceps. Pt. is advised to maintain consistency with HEP while out of town next week. .  · Communication/Consultation:  None today  · Equipment provided today:  None today  · Recommendations/Intent for next treatment session: Next visit will focus on ROM and quadriceps activation.      Total Treatment Billable Duration:  55 minutes total time  PT Patient Time In/Time Out  Time In: 1430  Time Out: 315 Jimmy Luis, PT    Future Appointments   Date Time Provider Melody Puentes   6/7/2021  7:00 PM Jae Andrews, PT [facility]   6/10/2021  7:00 PM Jae Andrews, PT [facility]   6/15/2021  2:30 PM Jae Andrews PT [facility]   6/17/2021  2:30 PM Jae Andrews PT [facility]   6/22/2021  2:30 PM Jae Andrews PT [facility]   6/24/2021  2:30 PM Jae Andrews PT [facility]   6/29/2021  9:00 AM Aliya Ruffin MD [facility]   6/29/2021  2:30 PM Jae Andrews PT [facility]   7/1/2021  2:30 PM Jae Andrews PT [facility] 7/12/2021  4:00 PM Carmen Cruz MD [facility]

## 2021-06-03 NOTE — PROGRESS NOTES
Hugo Roldan  : 1980  Primary: [employer]  Secondary:  Therapy Center at [address]  [phone]   [phone]        OUTPATIENT PHYSICAL THERAPY:Progress Report 6/3/2021   ICD-10: Treatment Diagnosis: Pain in Joint, L knee [M25.562]; Difficulty Walking, Not Elsewhere Classified [R26.2]; Muscle Wasting and Atrophy, Not Elsewhere Classified, L thigh [M62.552]  Precautions/Allergies:   Sulfa (sulfonamide antibiotics) and Adhesive   TREATMENT PLAN:  Effective Dates: 2021 TO 2021 (90 days). Frequency/Duration: 2 times a week for 90 Day(s) MEDICAL/REFERRING DIAGNOSIS:  Sprain of anterior cruciate ligament of left knee, initial encounter [S83.512A]  Other tear of medial meniscus, current injury, left knee, initial encounter [S83.242A]   DATE OF ONSET: 3/31/21  REFERRING PHYSICIAN: Shagufta Crowe MD MD Orders: Evaluation and Treat, Per Protocol; 2 x a week for 12 weeks  Return MD Appointment: 21     INITIAL ASSESSMENT:  Ms. Grayson Plants presents s/p L ACL repair with allograft and L medial meniscus repair on 3/31/21 to address instability following a L ACL tear. Pt. Attends PT ambulating with 2 crutches and TDWB. Assessment of incision reveals normal signs of healing at this time with moderate swelling present around the L knee. Pt. Demonstrates ROM limitations for L knee flexion and extension at this time. Neuromuscular control of the L quadriceps is diminished and strength deficits are present post surgery. Pt. Will benefit from skilled PT to address impairments identified through evaluation and return to previous level of function     5/10/21 Progress Report: Pt. Attends 10 physical therapy sessions. Pt. Demonstrates improved weightbearing and gait performance since starting PT.  L knee extension range of motion remains limited compared to the R side.   Progress with lower quarter strengthening is limited secondary to knee extension loss and difficulty with neuromuscular control of the quadriceps. Pt. Will benefit from continued PT at this time to address remaining goals. 6/3/21 Progress Report: Pt. Attends 20 physical therapy sessions. Pt. Continues to demonstrate limited L knee extension compared to the unaffected side. Pt. Is making progress toward normalization of ROM despite remaining limitations. Due to slow progress of ROM, gains in L LE strengthening are additionally delayed at this time. Neuromuscular control of the L quadriceps continues to improve, and patient will benefit from advancement of L hip and quadriceps strengthening as ROM continues to improve. PROBLEM LIST (Impacting functional limitations):  1. Decreased Strength  2. Decreased ADL/Functional Activities  3. Decreased Balance  4. Increased Pain  5. Decreased Activity Tolerance  6. Decreased Flexibility/Joint Mobility  7. Decreased [place] with Home Exercise Program INTERVENTIONS PLANNED: (Treatment may consist of any combination of the following)  1. Balance Exercise  2. Cryotherapy  3. Electrical Stimulation  4. Gait Training  5. Home Exercise Program (HEP)  6. Manual Therapy  7. Neuromuscular Re-education/Strengthening  8. Range of Motion (ROM)  9. Therapeutic Activites  10. Therapeutic Exercise/Strengthening     GOALS: (Goals have been discussed and agreed upon with patient.)  Discharge Goals: Time Frame: 4 months  1. Pt. Will demonstrate < 2/10 L knee pain to improve symptomatic complaints. ONGOING  2. Pt. Will demonstrate >1  Degree of L knee extension to improve ROM. ONGOING  3. Pt. Will demonstrate > 125 degrees of L knee flexion to improve ROM. MET  4. Pt. Will ambulate for >10 minutes without crutches to improve ambulation. MET  5. Pt. Will demonstrate 5 single LE squats on the L LE to demonstrate improved strength. ONGOING  6. Pt. Will be independent with HEP to continue LE strengthening. ONGOING  7. Pt.  Will score > 60 on the LEFS to demonstrate improved pain and function. ONGOING    OUTCOME MEASURE:   Tool Used: Lower Extremity Functional Scale (LEFS)  Score:  Initial: 18/80 Most Recent: 53/80 (Date: 6/3/21 )   Interpretation of Score: 20 questions each scored on a 5 point scale with 0 representing \"extreme difficulty or unable to perform\" and 4 representing \"no difficulty\". The lower the score, the greater the functional disability. 80/80 represents no disability. Minimal detectable change is 9 points. Observation:       Gait Observation: No AD utilized. Gait performance reveals limited stance phase on the L LE secondary to knee extension loss. Range of Motion:                                  Right                                        Left  Knee Extension 2 degrees (-3) degrees (does achieve 2 degrees with prolonged stretch)   Knee Flexion 129 degrees 125 degrees   Ankle Dorsiflexion -- --     Strength:                                                Right                                        Left  Hip Extension 4/5 4/5   Hip Abduction 4/5 4/5   Knee Extension 5/5 3+/5   Knee Flexion 5/5 4/5   Functional Squat             MEDICAL NECESSITY:   · Patient is expected to demonstrate progress in strength, range of motion and balance to increase independence with ADL's and recreational activities. .  REASON FOR SERVICES/OTHER COMMENTS:  · Patient will benefit from skilled PT to address impairments identified through evaluation and return to previous ADL and recreational capacity. Total Duration:  PT Patient Time In/Time Out  Time In: 1430  Time Out: 1530    Rehabilitation Potential For Stated Goals: Good  Regarding Hugo Roldan's therapy, I certify that the treatment plan above will be carried out by a therapist or under their direction. Thank you for this referral,  Caryle Roulette, PT     Referring Physician Signature: Ara Castro MD No Signature is Required for this note.

## 2021-06-07 ENCOUNTER — APPOINTMENT (OUTPATIENT)
Dept: PHYSICAL THERAPY | Age: 41
End: 2021-06-07
Payer: COMMERCIAL

## 2021-06-10 ENCOUNTER — APPOINTMENT (OUTPATIENT)
Dept: PHYSICAL THERAPY | Age: 41
End: 2021-06-10
Payer: COMMERCIAL

## 2021-06-15 ENCOUNTER — APPOINTMENT (OUTPATIENT)
Dept: PHYSICAL THERAPY | Age: 41
End: 2021-06-15
Payer: COMMERCIAL

## 2021-06-17 ENCOUNTER — HOSPITAL ENCOUNTER (OUTPATIENT)
Dept: PHYSICAL THERAPY | Age: 41
Discharge: HOME | End: 2021-06-17
Payer: COMMERCIAL

## 2021-06-17 PROCEDURE — 97140 MANUAL THERAPY 1/> REGIONS: CPT

## 2021-06-17 PROCEDURE — 97110 THERAPEUTIC EXERCISES: CPT

## 2021-06-17 NOTE — PROGRESS NOTES
Hugo Roldan  : 1980  Primary: [employer]  Secondary:  [facility] at [address], [address] [place] [address]  [phone]   [phone]        OUTPATIENT PHYSICAL THERAPY: Daily Treatment Note 2021  Visit Count:  16    ICD-10: Treatment Diagnosis: Pain in Joint, L knee [M25.562]; Difficulty Walking, Not Elsewhere Classified [R26.2]; Muscle Wasting and Atrophy, Not Elsewhere Classified, L thigh [M62.552]  Precautions/Allergies:   Sulfa (sulfonamide antibiotics) and Adhesive   TREATMENT PLAN:  Effective Dates: 2021 TO 2021 (90 days). Frequency/Duration: 2 times a week for 90 Day(s) MEDICAL/REFERRING DIAGNOSIS:  Rupture of anterior cruciate ligament of left knee, initial encounter [S83.512A]  Acute medial meniscus tear, left, initial encounter [J96.303E]   DATE OF ONSET: 3/31/21  REFERRING PHYSICIAN: Cindy Hammonds MD MD Orders: Evaluation and Treat, Per Protocol; 2 x a week for 12 weeks  Return MD Appointment: 21       Pre-treatment Symptoms/Complaints:  Pt. Denies an set backs while out of town on vacation last week. Pt. Reports good compliance with HEP. Pain: Initial: Pain Intensity 1: 4 /10 Post Session:  4/10   Medications Last Reviewed:  2021  Updated Objective Findings:  L knee extension (-2) degrees AROM, 0 degrees PROM  TREATMENT:     Therapeutic Exercise: (45 Minutes):  Exercises per grid below to improve mobility, strength, balance and coordination. Required moderate visual, verbal and manual cues to promote proper body alignment, promote proper body posture and promote proper body mechanics. Progressed resistance, range and repetitions as indicated.      Date:  2021   Activity/Exercise Parameters   Knee extension ROM 6 minutes   Quad setting (supine) 2 minutes   Knee flexion ROM --   Gait training 3 minutes   Stars 5 minutes   Heel slides     SLR 3 x 20   Calf stretch 3 minutes   Hip abduction 3 x 10   clams -- Mini squat --   Shuttle (4-8 bands) 4 x 15   bike 5 minutes   TKE (purple band) --   Lateral walking 4 minutes   Calf raise --   Front and side planks --   Single LE balance 2 minutes   Manual Therapy (    Soft Tissue Mobilization Duration  Duration: 10 Minutes): Manual techniques to facilitate improved motion and decreased pain. (Used abbreviations: MET - muscle energy technique; PNF - proprioceptive neuromuscular facilitation; NMR - neuromuscular re-education; a/p - anterior to posterior; p/a - posterior to anterior)   · Joint mobilization: L patellofemoral all directions grade III-IV, shearing ant-post, post-ant grade III-IV, seated flexion with anterior tibial glide  · Soft tissue mobilization: L quadriceps, hamstrings and calf musculature. (NOT PERFORMED)    Game ready: 15 minutes (NOT PERFORMED)     [employer]  Treatment/Session Summary:    · Response to Treatment:  Pt. demonstrate improved neuromuscular control of the quadriceps this week and will benefit from advancement of lower quarter strengthening program.  L knee extension remains limited but progressively makes improvement. .  · Communication/Consultation:  None today  · Equipment provided today:  None today  · Recommendations/Intent for next treatment session: Next visit will focus on ROM and lower quarter strengthening.      Total Treatment Billable Duration:  55 minutes total time  PT Patient Time In/Time Out  Time In: 1430  Time Out: 315 Jimmy Luis PT    Future Appointments   Date Time Provider Melody Puentes   6/22/2021  2:30 PM Zane Quintana PT [facility]   6/24/2021  2:30 PM Zane Quintana PT [facility]   6/29/2021  9:00 AM ROSS Green [facility]   6/29/2021  2:30 PM Zane Quintana PT [facility]   7/1/2021  2:30 PM Zane Quintana PT [facility]   7/12/2021  4:00 PM Harry Chris MD [facility]

## 2021-06-24 ENCOUNTER — HOSPITAL ENCOUNTER (OUTPATIENT)
Dept: PHYSICAL THERAPY | Age: 41
Discharge: HOME | End: 2021-06-24
Payer: COMMERCIAL

## 2021-06-24 PROCEDURE — 97110 THERAPEUTIC EXERCISES: CPT

## 2021-06-24 PROCEDURE — 97140 MANUAL THERAPY 1/> REGIONS: CPT

## 2021-06-24 NOTE — PROGRESS NOTES
Hugo Roldan  : 1980  Primary: [employer]  Secondary:  [facility] at [address], [address] [place] [address]  [phone]   [phone]        OUTPATIENT PHYSICAL THERAPY: Daily Treatment Note 2021  Visit Count:  17    ICD-10: Treatment Diagnosis: Pain in Joint, L knee [M25.562]; Difficulty Walking, Not Elsewhere Classified [R26.2]; Muscle Wasting and Atrophy, Not Elsewhere Classified, L thigh [M62.552]  Precautions/Allergies:   Sulfa (sulfonamide antibiotics) and Adhesive   TREATMENT PLAN:  Effective Dates: 2021 TO 2021 (90 days). Frequency/Duration: 2 times a week for 90 Day(s) MEDICAL/REFERRING DIAGNOSIS:  Rupture of anterior cruciate ligament of left knee, initial encounter [S83.512A]  Acute medial meniscus tear, left, initial encounter [Y65.226E]   DATE OF ONSET: 3/31/21  REFERRING PHYSICIAN: Ml Almendarez MD MD Orders: Evaluation and Treat, Per Protocol; 2 x a week for 12 weeks  Return MD Appointment: 21       Pre-treatment Symptoms/Complaints:  Pt. Reports anterior knee pain today after walking a mile last night. Pain: Initial: Pain Intensity 1: 3 /10 Post Session:  3/10   Medications Last Reviewed:  2021  Updated Objective Findings:  None Today  TREATMENT:     Therapeutic Exercise: (45 Minutes):  Exercises per grid below to improve mobility, strength, balance and coordination. Required moderate visual, verbal and manual cues to promote proper body alignment, promote proper body posture and promote proper body mechanics. Progressed resistance, range and repetitions as indicated.      Date:  2021   Activity/Exercise Parameters   Knee extension ROM 6 minutes   Quad setting (supine) 2 minutes   Knee flexion ROM --   Gait training --   Stars 5 minutes   Heel slides     SLR 3 x 20   Calf stretch 3 minutes   Hip abduction --   clams --   Mini squat 6 minutes   Shuttle (4-8 bands) 4 x 15   bike 5 minutes   TKE (purple band) 2 x 20   Lateral walking --   Calf raise --   Front and side planks 4 minutes   Single LE balance --   Manual Therapy (    Soft Tissue Mobilization Duration  Duration: 10 Minutes): Manual techniques to facilitate improved motion and decreased pain. (Used abbreviations: MET - muscle energy technique; PNF - proprioceptive neuromuscular facilitation; NMR - neuromuscular re-education; a/p - anterior to posterior; p/a - posterior to anterior)   · Joint mobilization: L patellofemoral all directions grade III-IV, shearing ant-post, post-ant grade III-IV, seated flexion with anterior tibial glide  · Soft tissue mobilization: L quadriceps, hamstrings and calf musculature. Game ready: 15 minutes (NOT PERFORMED)     [employer]  Treatment/Session Summary:    · Response to Treatment:  L knee extension regresses this week. Pt. is advised to maintain R knee extension exercises as part of HEP. Ilir Roach · Communication/Consultation:  None today  · Equipment provided today:  None today  · Recommendations/Intent for next treatment session: Next visit will focus on ROM and lower quarter strengthening.      Total Treatment Billable Duration:  55 minutes total time  PT Patient Time In/Time Out  Time In: 1430  Time Out: 315 Jimmy Luis, PT    Future Appointments   Date Time Provider Melody Puentes   6/29/2021  9:00 AM Eric Lozano [facility]   6/29/2021  2:30 PM Geraldo Loveless, PT [facility]   7/1/2021  2:30 PM Shipman Loveless, PT [facility]   7/7/2021  7:30 AM Shipman Loveless, PT [facility]   7/12/2021  4:00 PM Denis Mahmood MD [facility]   7/13/2021  9:30 AM Shipman Loveless, PT [facility]   7/15/2021  2:30 PM Geraldo Loveless, PT [facility]   7/20/2021  3:30 PM Fransisco Rang [facility]   7/22/2021  3:30 PM Fransisco Rang [facility]   7/27/2021  2:30 PM Geraldo Loveless, PT [facility]   7/29/2021  3:30 PM Geraldo Loveless, PT [facility]

## 2021-06-29 ENCOUNTER — HOSPITAL ENCOUNTER (OUTPATIENT)
Dept: PHYSICAL THERAPY | Age: 41
Discharge: HOME | End: 2021-06-29
Payer: COMMERCIAL

## 2021-06-29 PROCEDURE — 97140 MANUAL THERAPY 1/> REGIONS: CPT

## 2021-06-29 PROCEDURE — 97110 THERAPEUTIC EXERCISES: CPT

## 2021-06-29 NOTE — PROGRESS NOTES
Hugo Roldan  : 1980  Primary: [employer]  Secondary:  [facility]rapy [facility] at [address], [address] [place] [address]  [phone]   [phone]        OUTPATIENT PHYSICAL THERAPY: Daily Treatment Note 2021  Visit Count:  18    ICD-10: Treatment Diagnosis: Pain in Joint, L knee [M25.562]; Difficulty Walking, Not Elsewhere Classified [R26.2]; Muscle Wasting and Atrophy, Not Elsewhere Classified, L thigh [M62.552]  Precautions/Allergies:   Sulfa (sulfonamide antibiotics) and Adhesive   TREATMENT PLAN:  Effective Dates: 2021 TO 2021 (90 days). Frequency/Duration: 2 times a week for 90 Day(s) MEDICAL/REFERRING DIAGNOSIS:  Rupture of anterior cruciate ligament of left knee, initial encounter [S83.512A]  Acute medial meniscus tear, left, initial encounter [P75.833Q]   DATE OF ONSET: 3/31/21  REFERRING PHYSICIAN: Charley Mills MD MD Orders: Evaluation and Treat, Per Protocol; 2 x a week for 12 weeks  Return MD Appointment: 21       Pre-treatment Symptoms/Complaints:  Pt. Notes good tolerance to strengthening exercises last PT session. Pain: Initial: Pain Intensity 1: 2 /10 Post Session:  2/10   Medications Last Reviewed:  2021  Updated Objective Findings:  L knee extension (-2) degrees pre therapy and 0 degrees post therapy. TREATMENT:     Therapeutic Exercise: (45 Minutes):  Exercises per grid below to improve mobility, strength, balance and coordination. Required moderate visual, verbal and manual cues to promote proper body alignment, promote proper body posture and promote proper body mechanics. Progressed resistance, range and repetitions as indicated.      Date:  2021   Activity/Exercise Parameters   Knee extension ROM 6 minutes   Quad setting (supine) --   Knee flexion ROM --   Gait training --   Stars --   Bridges 3 x 20   SLR 3 x 20   Calf stretch 3 minutes   Hip abduction 2 x 10   clams 4 minutes   Squats 3 x 12   Shuttle (4-8 bands) 4 x 15   bike 5 minutes   TKE (purple band) --   Lateral walking --   Calf raise 2 x 20   Front and side planks --   Step down 2\" 3 x 10   Manual Therapy (    Soft Tissue Mobilization Duration  Duration: 10 Minutes): Manual techniques to facilitate improved motion and decreased pain. (Used abbreviations: MET - muscle energy technique; PNF - proprioceptive neuromuscular facilitation; NMR - neuromuscular re-education; a/p - anterior to posterior; p/a - posterior to anterior)   · Joint mobilization: L patellofemoral all directions grade III-IV, shearing ant-post, post-ant grade III-IV, seated flexion with anterior tibial glide  · Soft tissue mobilization: L quadriceps, hamstrings and calf musculature. Game ready: 15 minutes (NOT PERFORMED)     [employer]  Treatment/Session Summary:    · Response to Treatment:  L knee extension ROM improves with sustained knee extension stretching today. Pt. continues to fatigue easily with quadriceps strengthening exercises. .  · Communication/Consultation:  None today  · Equipment provided today:  None today  · Recommendations/Intent for next treatment session: Next visit will focus on ROM and lower quarter strengthening.      Total Treatment Billable Duration:  55 minutes total time  PT Patient Time In/Time Out  Time In: 1430  Time Out: 315 Jimmy Luis, PT    Future Appointments   Date Time Provider Melody Puentes   7/1/2021  2:30 PM Annette Kaylah, PT [facility]   7/7/2021  7:30 AM Annette Kaylah, PT [facility]   7/12/2021  4:00 PM Nicho GRANADO MD [facility]   7/13/2021  9:30 AM Annette Kaylah, PT [facility]   7/15/2021  2:30 PM Annette Kaylah, PT [facility]   7/20/2021  3:30 PM Juwan Sera [facility]   7/22/2021  3:30 PM Juwan Sera [facility]   7/27/2021  2:30 PM Annette Kaylah, PT [facility]   7/29/2021  3:30 PM Annette Kaylah, PT [facility]

## 2021-07-01 ENCOUNTER — HOSPITAL ENCOUNTER (OUTPATIENT)
Dept: PHYSICAL THERAPY | Age: 41
Discharge: HOME | End: 2021-07-01
Payer: COMMERCIAL

## 2021-07-01 PROCEDURE — 97110 THERAPEUTIC EXERCISES: CPT

## 2021-07-01 PROCEDURE — 97140 MANUAL THERAPY 1/> REGIONS: CPT

## 2021-07-01 NOTE — THERAPY RECERTIFICATION
Hugo Roldna  : 1980  Primary: [employer]  Secondary:  [facility]rapy Center at [address]  [phone]   [phone]        OUTPATIENT PHYSICAL THERAPY:Recertification 9/3/8477   ICD-10: Treatment Diagnosis: Pain in Joint, L knee [M25.562]; Difficulty Walking, Not Elsewhere Classified [R26.2]; Muscle Wasting and Atrophy, Not Elsewhere Classified, L thigh [M62.552]  Precautions/Allergies:   Sulfa (sulfonamide antibiotics) and Adhesive   TREATMENT PLAN:  Effective Dates: 2021 TO 2021 . Frequency/Duration: 2 times a week for 12 weeks MEDICAL/REFERRING DIAGNOSIS:  Sprain of anterior cruciate ligament of left knee, initial encounter [S83.512A]  Other tear of medial meniscus, current injury, left knee, initial encounter [S83.242A]   DATE OF ONSET: 3/31/21  REFERRING PHYSICIAN: Janneth Porras MD MD Orders: Evaluation and Treat, Per Protocol; 2 x a week for 12 weeks  Return MD Appointment: 21     INITIAL ASSESSMENT:  Ms. Bossman Ruggiero presents s/p L ACL repair with allograft and L medial meniscus repair on 3/31/21 to address instability following a L ACL tear. Pt. Attends PT ambulating with 2 crutches and TDWB. Assessment of incision reveals normal signs of healing at this time with moderate swelling present around the L knee. Pt. Demonstrates ROM limitations for L knee flexion and extension at this time. Neuromuscular control of the L quadriceps is diminished and strength deficits are present post surgery. Pt. Will benefit from skilled PT to address impairments identified through evaluation and return to previous level of function     5/10/21 Progress Report: Pt. Attends 10 physical therapy sessions. Pt. Demonstrates improved weightbearing and gait performance since starting PT.  L knee extension range of motion remains limited compared to the R side.   Progress with lower quarter strengthening is limited secondary to knee extension loss and difficulty with neuromuscular control of the quadriceps. Pt. Will benefit from continued PT at this time to address remaining goals. 6/3/21 Progress Report: Pt. Attends 20 physical therapy sessions. Pt. Continues to demonstrate limited L knee extension compared to the unaffected side. Pt. Is making progress toward normalization of ROM despite remaining limitations. Due to slow progress of ROM, gains in L LE strengthening are additionally delayed at this time. Neuromuscular control of the L quadriceps continues to improve, and patient will benefit from advancement of L hip and quadriceps strengthening as ROM continues to improve. 7/1/21 Progress Report: Pt. Attends 24 physical therapy sessions. L knee ROM has normalized compared to unaffected side. Pt. Is advanced with L quadriceps and hip strengthening to improve remaining ADL and recreational capacity. Pt. Would like to return to tennis as long term goal.  Pt. Will benefit from continued lower quarter strengthening and endurance at this time. PROBLEM LIST (Impacting functional limitations):  1. Decreased Strength  2. Decreased ADL/Functional Activities  3. Decreased Balance  4. Increased Pain  5. Decreased Activity Tolerance  6. Decreased Flexibility/Joint Mobility  7. Decreased [place] with Home Exercise Program INTERVENTIONS PLANNED: (Treatment may consist of any combination of the following)  1. Balance Exercise  2. Cryotherapy  3. Electrical Stimulation  4. Gait Training  5. Home Exercise Program (HEP)  6. Manual Therapy  7. Neuromuscular Re-education/Strengthening  8. Range of Motion (ROM)  9. Therapeutic Activites  10. Therapeutic Exercise/Strengthening     GOALS: (Goals have been discussed and agreed upon with patient.)  Discharge Goals: Time Frame: 4 months  1. Pt. Will demonstrate < 2/10 L knee pain to improve symptomatic complaints. ONGOING  2. Pt. Will demonstrate >1  Degree of L knee extension to improve ROM. MET  3. Pt. Will demonstrate > 125 degrees of L knee flexion to improve ROM. MET  4. Pt. Will ambulate for >10 minutes without crutches to improve ambulation. MET  5. Pt. Will demonstrate 5 single LE squats on the L LE to demonstrate improved strength. ONGOING  6. Pt. Will be independent with HEP to continue LE strengthening. ONGOING  7. Pt. Will score > 60 on the LEFS to demonstrate improved pain and function. MET  8. Pt. Will run for 10 minutes without L knee pain to improve recreational activitiy    OUTCOME MEASURE:   Tool Used: Lower Extremity Functional Scale (LEFS)  Score:  Initial: 18/80 Most Recent: 63/80 (Date: 7/1/21 )   Interpretation of Score: 20 questions each scored on a 5 point scale with 0 representing \"extreme difficulty or unable to perform\" and 4 representing \"no difficulty\". The lower the score, the greater the functional disability. 80/80 represents no disability. Minimal detectable change is 9 points. Observation:       Gait Observation: Pt. Ambulates with AD. Gait performance reveals limited stance phase on the L LE secondary to knee extension loss. Range of Motion:                                  Right                                        Left  Knee Extension 2 degrees 2 degrees   Knee Flexion 133 degrees 131 degrees   Ankle Dorsiflexion -- --     Strength:                                                Right                                        Left  Hip Extension 4/5 4/5   Hip Abduction 4/5 4/5   Knee Extension 5/5 4/5   Knee Flexion 5/5 4/5   Functional Squat             MEDICAL NECESSITY:   · Patient is expected to demonstrate progress in strength, range of motion and balance to increase independence with ADL's and recreational activities. .  REASON FOR SERVICES/OTHER COMMENTS:  · Patient will benefit from skilled PT to address impairments identified through evaluation and return to previous ADL and recreational capacity.    Total Duration:  PT Patient Time In/Time Out  Time In: 1430  Time Out: 1530    Rehabilitation Potential For Stated Goals: Good  Regarding Hugo Roldan's therapy, I certify that the treatment plan above will be carried out by a therapist or under their direction.   Thank you for this referral,  Shashank Webster PT     Referring Physician Signature: Chadwick Hart MD

## 2021-07-01 NOTE — PROGRESS NOTES
Hugo Roldan  : 1980  Primary: [employer]  Secondary:  [facility]rapy Center at [address]  [phone]   [phone]        OUTPATIENT PHYSICAL THERAPY: Daily Treatment Note 2021  Visit Count:  19    ICD-10: Treatment Diagnosis: Pain in Joint, L knee [M25.562]; Difficulty Walking, Not Elsewhere Classified [R26.2]; Muscle Wasting and Atrophy, Not Elsewhere Classified, L thigh [M62.552]  Precautions/Allergies:   Sulfa (sulfonamide antibiotics) and Adhesive   TREATMENT PLAN:  Effective Dates: 2021 TO 2021. Frequency/Duration: 2 times a week for 12 weeks MEDICAL/REFERRING DIAGNOSIS:  Rupture of anterior cruciate ligament of left knee, initial encounter [S83.512A]  Acute medial meniscus tear, left, initial encounter [T16.814R]   DATE OF ONSET: 3/31/21  REFERRING PHYSICIAN: Mariam Santana MD MD Orders: Evaluation and Treat, Per Protocol; 2 x a week for 12 weeks  Return MD Appointment: 21       Pre-treatment Symptoms/Complaints:  Pt. Reports good tolerance to last PT session. Pain: Initial: Pain Intensity 1: 2 /10 Post Session:  2/10   Medications Last Reviewed:  2021  Updated Objective Findings:  See evaluation note from today  TREATMENT:     Therapeutic Exercise: (45 Minutes):  Exercises per grid below to improve mobility, strength, balance and coordination. Required moderate visual, verbal and manual cues to promote proper body alignment, promote proper body posture and promote proper body mechanics. Progressed resistance, range and repetitions as indicated.      Date:  2021   Activity/Exercise Parameters   Knee extension ROM 6 minutes   Quad setting (supine) --   Knee flexion ROM --   Step ups front and lateral 8\" 5 minutes   Stars --   Bridges 3 x 20   SLR --   Calf stretch 3 minutes   Hip abduction 2 x 10   clams --   Squats 4 x 12   Shuttle (4-8 bands) 4 x 15   bike 5 minutes   TKE (purple band) -- Lateral walking --   Calf raise 2 x 20   Front and side planks --   Step down 2\" 3 x 10   Manual Therapy (    Soft Tissue Mobilization Duration  Duration: 10 Minutes): Manual techniques to facilitate improved motion and decreased pain. (Used abbreviations: MET - muscle energy technique; PNF - proprioceptive neuromuscular facilitation; NMR - neuromuscular re-education; a/p - anterior to posterior; p/a - posterior to anterior)   · Joint mobilization: L patellofemoral all directions grade III-IV, shearing ant-post, post-ant grade III-IV, seated flexion with anterior tibial glide  · Soft tissue mobilization: L quadriceps, hamstrings and calf musculature. Game ready: 15 minutes (NOT PERFORMED)     [employer] [facility]  Treatment/Session Summary:    · Response to Treatment:  Pt. requires increased PT cueing for appropriate squat mechanics today including core control during movement. Pt. tolerates todays exercise without complaints. .  · Communication/Consultation:  None today  · Equipment provided today:  None today  · Recommendations/Intent for next treatment session: Next visit will focus on lower quarter strengthening.      Total Treatment Billable Duration:  55 minutes total time  PT Patient Time In/Time Out  Time In: 1430  Time Out: 315 Jimmy Luis, PT    Future Appointments   Date Time Provider Melody Puentes   7/7/2021  7:30 AM Kay Mary, PT [facility]   7/12/2021  4:00 PM Adelaide Marques MD [facility]   7/13/2021  9:30 AM Kay Mary, PT [facility]   7/15/2021  2:30 PM Kay Mary, PT [facility]   7/20/2021  3:30 PM Anastasiya Capes [facility]   7/22/2021  3:30 PM Anastasiya Capes [facility]   7/27/2021  2:30 PM Kay Mary, PT [facility]   7/29/2021  3:30 PM Kay Mary, PT [facility]

## 2021-07-07 ENCOUNTER — HOSPITAL ENCOUNTER (OUTPATIENT)
Dept: PHYSICAL THERAPY | Age: 41
Discharge: HOME | End: 2021-07-07
Payer: COMMERCIAL

## 2021-07-07 PROCEDURE — 97140 MANUAL THERAPY 1/> REGIONS: CPT

## 2021-07-07 PROCEDURE — 97110 THERAPEUTIC EXERCISES: CPT

## 2021-07-07 NOTE — PROGRESS NOTES
Hugo Roldan  : 1980  Primary: [employer]  Secondary:  Therapy Center at [address]  [phone]   [phone]        OUTPATIENT PHYSICAL THERAPY: Daily Treatment Note 2021  Visit Count:  20    ICD-10: Treatment Diagnosis: Pain in Joint, L knee [M25.562]; Difficulty Walking, Not Elsewhere Classified [R26.2]; Muscle Wasting and Atrophy, Not Elsewhere Classified, L thigh [M62.552]  Precautions/Allergies:   Sulfa (sulfonamide antibiotics) and Adhesive   TREATMENT PLAN:  Effective Dates: 2021 TO 2021. Frequency/Duration: 2 times a week for 12 weeks MEDICAL/REFERRING DIAGNOSIS:  Rupture of anterior cruciate ligament of left knee, initial encounter [S83.512A]  Acute medial meniscus tear, left, initial encounter [S83.242A]   DATE OF ONSET: 3/31/21  REFERRING PHYSICIAN: Gordo Galvin MD MD Orders: Evaluation and Treat, Per Protocol; 2 x a week for 12 weeks  Return MD Appointment: 21       Pre-treatment Symptoms/Complaints:  Pt. C/o mild anterior [place] pain [place]. Pain: Initial: Pain Intensity 1: 2 /10 Post Session:  210   Medications Last Reviewed:  2021  Updated Objective Findings:  None Today  TREATMENT:     Therapeutic Exercise: (45 Minutes):  Exercises per grid below to improve mobility, strength, balance and coordination. Required moderate visual, verbal and manual cues to promote proper body alignment, promote proper body posture and promote proper body mechanics. Progressed resistance, range and repetitions as indicated.      Date:  2021   Activity/Exercise Parameters   Knee extension ROM 4 minutes   Quad setting (supine) --   Knee flexion ROM --   Step ups front and lateral 8\" --   Stars --   Bridges with LE lift 3 x 15   SLR --   Calf stretch 3 minutes   Hip abduction 2 x 10   clams --   Squats 4 x 10   Shuttle (4-8 bands) 4 x 15   bike 5 minutes   TKE (purple band) 3 x 10   Lateral walking 4 minutes   Calf raise 2 x 20   Front and side planks 3 minutes   Step down 2\" --   Manual Therapy (    Soft Tissue Mobilization Duration  Duration: 10 Minutes): Manual techniques to facilitate improved motion and decreased pain. (Used abbreviations: MET - muscle energy technique; PNF - proprioceptive neuromuscular facilitation; NMR - neuromuscular re-education; a/p - anterior to posterior; p/a - posterior to anterior)   · Joint mobilization: L patellofemoral all directions grade III-IV, shearing ant-post, post-ant grade III-IV, seated flexion with anterior tibial glide  · Soft tissue mobilization: L quadriceps, hamstrings and calf musculature. Game ready: 15 minutes (NOT PERFORMED)     [employer] [facility]  Treatment/Session Summary:    · Response to Treatment:  Pt. may be experiencing mild onset of patellar tendonitis in the L knee. PT will monitor anterior knee pain and adjust strengthening program accordingly. .  · Communication/Consultation:  None today  · Equipment provided today:  None today  · Recommendations/Intent for next treatment session: Next visit will focus on lower quarter strengthening.      Total Treatment Billable Duration:  55 minutes total time  PT Patient Time In/Time Out  Time In: 0730  Time Out: 0830  Rayna Kaye PT    Future Appointments   Date Time Provider Melody Puentes   7/12/2021  4:00 PM Darryn Grimaldo MD [facility]   7/13/2021  9:30 AM Hyland Sicard, PT [facility]   7/15/2021  2:30 PM Hyland Sicard, PT [facility]   7/20/2021  3:30 PM Jessie Rosa [facility]   7/22/2021  3:30 PM Somersetnorah Peac[facility]ocent [facility]   7/27/2021  2:30 PM Hyland Sicard, PT [facility]   7/29/2021  3:30 PM Hyland Sicard, PT [facility]

## 2021-07-13 ENCOUNTER — HOSPITAL ENCOUNTER (OUTPATIENT)
Dept: PHYSICAL THERAPY | Age: 41
Discharge: HOME | End: 2021-07-13
Payer: COMMERCIAL

## 2021-07-13 PROCEDURE — 97140 MANUAL THERAPY 1/> REGIONS: CPT

## 2021-07-13 PROCEDURE — 97110 THERAPEUTIC EXERCISES: CPT

## 2021-07-13 NOTE — PROGRESS NOTES
Hugo Roldan  : 1980  Primary: [employer]  Secondary:  Therapy Center at [address], [address]  [phone]   [phone]        OUTPATIENT PHYSICAL THERAPY: Daily Treatment Note 2021  Visit Count:  21    ICD-10: Treatment Diagnosis: Pain in Joint, L knee [M25.562]; Difficulty Walking, Not Elsewhere Classified [R26.2]; Muscle Wasting and Atrophy, Not Elsewhere Classified, L thigh [M62.552]  Precautions/Allergies:   Sulfa (sulfonamide antibiotics) and Adhesive   TREATMENT PLAN:  Effective Dates: 2021 TO 2021. Frequency/Duration: 2 times a week for 12 weeks MEDICAL/REFERRING DIAGNOSIS:  Rupture of anterior cruciate ligament of left knee, initial encounter [S83.512A]  Acute medial meniscus tear, left, initial encounter [E34.068B]   DATE OF ONSET: 3/31/21  REFERRING PHYSICIAN: Virgilio Gutierrez MD MD Orders: Evaluation and Treat, Per Protocol; 2 x a week for 12 weeks  Return MD Appointment: 21       Pre-treatment Symptoms/Complaints:  Pt. Notes continued mild intermittent L anterior knee pain. Pain: Initial: Pain Intensity 1: 2 /10 Post Session:  210   Medications Last Reviewed:  2021  Updated Objective Findings:  no tenderness to palpation of the quadriceps tendon or fat pad. TREATMENT:     Therapeutic Exercise: (45 Minutes):  Exercises per grid below to improve mobility, strength, balance and coordination. Required moderate visual, verbal and manual cues to promote proper body alignment, promote proper body posture and promote proper body mechanics. Progressed resistance, range and repetitions as indicated.      Date:  2021   Activity/Exercise Parameters   Knee extension ROM 4 minutes   Quad setting (supine) --   Knee flexion ROM --   Step ups front and lateral 8\" --   Stars --   Bridges with LE lift 3 x 15   SLR --   Calf stretch 3 minutes   Hip abduction 2 x 10   clams 4 minutes   Squats 4 x 10 Shuttle (4-8 bands) 4 x 15   bike 5 minutes   TKE (purple band) --   Lateral walking (red Band) 4 minutes   Calf raise 2 x 20   Front and side planks --   Step down 2\" 3 x 10   Manual Therapy (    Soft Tissue Mobilization Duration  Duration: 10 Minutes): Manual techniques to facilitate improved motion and decreased pain. (Used abbreviations: MET - muscle energy technique; PNF - proprioceptive neuromuscular facilitation; NMR - neuromuscular re-education; a/p - anterior to posterior; p/a - posterior to anterior)   · Joint mobilization: L patellofemoral all directions grade III-IV, shearing ant-post, post-ant grade III-IV, seated flexion with anterior tibial glide  · Soft tissue mobilization: L quadriceps, hamstrings and calf musculature. Game ready: 15 minutes (NOT PERFORMED)     [employer] [facility]  Treatment/Session Summary:    · Response to Treatment:  Pt. is able to complete therapeutic exercise today without complaints of anterior knee pain. Pt. demonstrate improved knee control during step down exercises. .  · Communication/Consultation:  None today  · Equipment provided today:  None today  · Recommendations/Intent for next treatment session: Next visit will focus on lower quarter strengthening.      Total Treatment Billable Duration:  55 minutes total time  PT Patient Time In/Time Out  Time In: 0930  Time Out: 289 [facility], PT    Future Appointments   Date Time Provider Melody Puentes   7/15/2021  2:30 PM MELBA Aden   7/20/2021  7:00 PM Tawnya [facility]   7/22/2021  7:00 PM Tawnya [facility]   7/27/2021  2:30 PM MELBA Aden   7/29/2021  3:30 PM MELBA AdenIUM

## 2021-07-15 ENCOUNTER — HOSPITAL ENCOUNTER (OUTPATIENT)
Dept: PHYSICAL THERAPY | Age: 41
Discharge: HOME | End: 2021-07-15
Payer: COMMERCIAL

## 2021-07-15 NOTE — PROGRESS NOTES
Hugo Roldan  : 1980  Payor: Barry Pratt / Plan: Rose Marie Link PPO / Product Type: PPO /  [address]  at [facility] [address], [address] [facility]  [phone]   [phone]          DATE: 7/15/2021    Patient cancelled appointment today due to going out of town. Will plan to follow up on next scheduled visit.     Liliana Montes De Oca, PT, DPT, OCS

## 2021-07-20 ENCOUNTER — APPOINTMENT (OUTPATIENT)
Dept: PHYSICAL THERAPY | Age: 41
End: 2021-07-20
Payer: COMMERCIAL

## 2021-07-22 ENCOUNTER — APPOINTMENT (OUTPATIENT)
Dept: PHYSICAL THERAPY | Age: 41
End: 2021-07-22
Payer: COMMERCIAL

## 2021-07-27 ENCOUNTER — HOSPITAL ENCOUNTER (OUTPATIENT)
Dept: PHYSICAL THERAPY | Age: 41
Discharge: HOME | End: 2021-07-27
Payer: COMMERCIAL

## 2021-07-27 PROCEDURE — 97110 THERAPEUTIC EXERCISES: CPT

## 2021-07-27 PROCEDURE — 97140 MANUAL THERAPY 1/> REGIONS: CPT

## 2021-07-27 NOTE — PROGRESS NOTES
Hugo Roldan  : 1980  Primary: [employer]  Secondary:  [facility] at [address], [address] [place] [address]  [phone]   [phone]        OUTPATIENT PHYSICAL THERAPY: Daily Treatment Note 2021  Visit Count:  22    ICD-10: Treatment Diagnosis: Pain in Joint, L knee [M25.562]; Difficulty Walking, Not Elsewhere Classified [R26.2]; Muscle Wasting and Atrophy, Not Elsewhere Classified, L thigh [M62.552]  Precautions/Allergies:   Sulfa (sulfonamide antibiotics) and Adhesive   TREATMENT PLAN:  Effective Dates: 2021 TO 2021. Frequency/Duration: 2 times a week for 12 weeks MEDICAL/REFERRING DIAGNOSIS:  Rupture of anterior cruciate ligament of left knee, initial encounter [S83.512A]  Acute medial meniscus tear, left, initial encounter [S83.242A]   DATE OF ONSET: 3/31/21  REFERRING PHYSICIAN: Vivek Dickerson MD MD Orders: Evaluation and Treat, Per Protocol; 2 x a week for 12 weeks  Return MD Appointment: 21       Pre-treatment Symptoms/Complaints:  Pt. Reports anterior knee pain remains, mostly with prolonged walking. Pain: Initial: Pain Intensity 1: 2 /10 Post Session:  2/10   Medications Last Reviewed:  2021  Updated Objective Findings:  None Today  TREATMENT:     Therapeutic Exercise: (45 Minutes):  Exercises per grid below to improve mobility, strength, balance and coordination. Required moderate visual, verbal and manual cues to promote proper body alignment, promote proper body posture and promote proper body mechanics. Progressed resistance, range and repetitions as indicated.      Date:  2021   Activity/Exercise Parameters   Knee extension ROM --   Golfers lift 3 x 10   Knee flexion ROM --   Step ups front and lateral 8\" --   Stars --   Bridges with LE lift 3 x 15   SLR --   Calf stretch 3 minutes   Hip abduction 2 x 15   clams --   Squats (eccentric control) 4 x 10   Shuttle (4-8 bands) 4 x 15   bike 5 minutes TKE (purple band) --   Lateral walking (red Band) 4 minutes   Calf raise 2 x 20   Front and side planks --   Step down 2\" 3 x 10   Manual Therapy (    Soft Tissue Mobilization Duration  Duration: 10 Minutes): Manual techniques to facilitate improved motion and decreased pain. (Used abbreviations: MET - muscle energy technique; PNF - proprioceptive neuromuscular facilitation; NMR - neuromuscular re-education; a/p - anterior to posterior; p/a - posterior to anterior)   · Joint mobilization: L patellofemoral all directions grade III-IV, shearing ant-post, post-ant grade III-IV, seated flexion with anterior tibial glide  · Soft tissue mobilization: L quadriceps, hamstrings and calf musculature. Game ready: 15 minutes (NOT PERFORMED)     [employer] Portal  Treatment/Session Summary:    · Response to Treatment:  Anterior knee pain is present during squatting exercises but not with shuttle today. Pt. is advsied to continued working on L knee extension ROM to prevent anterior knee pain. .  · Communication/Consultation:  None today  · Equipment provided today:  None today  · Recommendations/Intent for next treatment session: Next visit will focus on lower quarter strengthening.      Total Treatment Billable Duration:  55 minutes total time  PT Patient Time In/Time Out  Time In: 1430  Time Out: 315 Jimmy Luis, PT    Future Appointments   Date Time Provider Melody Puentes   7/29/2021  3:30 PM Eldon Ingles, PT [facility]   8/4/2021  2:30 PM Eldon Ingles, PT [facility]   8/10/2021  3:30 PM Eldon Ingles, PT [facility]   8/11/2021  2:30 PM Eldon Ingles, PT [facility]   8/16/2021  3:30 PM Eldon Ingles, PT [facility]   8/19/2021  3:30 PM Eldon Ingles, PT [facility]   8/23/2021  2:30 PM Eldon Ingles, PT [facility]   8/26/2021  2:30 PM Eldon Ingles, PT [facility]   8/30/2021  3:30 PM Eldon Ingles, PT [facility]   9/2/2021  3:30 PM Eldon Ingles, PT [facility]

## 2021-07-29 ENCOUNTER — HOSPITAL ENCOUNTER (OUTPATIENT)
Dept: PHYSICAL THERAPY | Age: 41
Discharge: HOME | End: 2021-07-29
Payer: COMMERCIAL

## 2021-07-29 PROCEDURE — 97110 THERAPEUTIC EXERCISES: CPT

## 2021-07-29 PROCEDURE — 97140 MANUAL THERAPY 1/> REGIONS: CPT

## 2021-07-29 NOTE — PROGRESS NOTES
Hugo Roldan  : 1980  Primary: [employer]  Secondary:  [facility] at [address]  [phone]   [phone]        OUTPATIENT PHYSICAL THERAPY: Daily Treatment Note 2021  Visit Count:  23    ICD-10: Treatment Diagnosis: Pain in Joint, L knee [M25.562]; Difficulty Walking, Not Elsewhere Classified [R26.2]; Muscle Wasting and Atrophy, Not Elsewhere Classified, L thigh [M62.552]  Precautions/Allergies:   Sulfa (sulfonamide antibiotics) and Adhesive   TREATMENT PLAN:  Effective Dates: 2021 TO 2021. Frequency/Duration: 2 times a week for 12 weeks MEDICAL/REFERRING DIAGNOSIS:  Rupture of anterior cruciate ligament of left knee, initial encounter [S83.512A]  Acute medial meniscus tear, left, initial encounter [P17.085Y]   DATE OF ONSET: 3/31/21  REFERRING PHYSICIAN: Corina Randle MD MD Orders: Evaluation and Treat, Per Protocol; 2 x a week for 12 weeks  Return MD Appointment: 21       Pre-treatment Symptoms/Complaints:  Pt. Notes continued anterior knee pain in the L knee. Pain: Initial: Pain Intensity 1: 2 /10 Post Session:  2/10   Medications Last Reviewed:  2021  Updated Objective Findings:  L knee extension 2 degrees. TREATMENT:     Therapeutic Exercise: (30 Minutes):  Exercises per grid below to improve mobility, strength, balance and coordination. Required moderate visual, verbal and manual cues to promote proper body alignment, promote proper body posture and promote proper body mechanics. Progressed resistance, range and repetitions as indicated.      Date:  2021   Activity/Exercise Parameters   Knee extension ROM 5 minutes   Golfers lift --   Knee flexion ROM --   Step ups front and lateral 8\" --   Stars --   Bridges with LE lift 3 x 15   SLR --   Calf stretch 3 minutes   Hip abduction --   clams --   Squats (eccentric control) 4 x 10   Shuttle (4-8 bands) --   bike 5 minutes   TKE (purple band) --   Lateral walking (red Band) --   Calf raise 2 x 20   Front and side planks --   Step down 2\" 3 x 10   Manual Therapy (    Soft Tissue Mobilization Duration  Duration: 25 Minutes): Manual techniques to facilitate improved motion and decreased pain. (Used abbreviations: MET - muscle energy technique; PNF - proprioceptive neuromuscular facilitation; NMR - neuromuscular re-education; a/p - anterior to posterior; p/a - posterior to anterior)   · Joint mobilization: L patellofemoral all directions grade III-IV, shearing ant-post, post-ant grade III-IV, seated flexion with anterior tibial glide  · Soft tissue mobilization: L quadriceps, hamstrings and calf musculature. Game ready: 15 minutes (NOT PERFORMED)     [employer] Portal  Treatment/Session Summary:    · Response to Treatment:  Duration of manual therapy is increased today to address anterior knee pain complaints. L knee extension ROM remains nearly the same as unaffected side. .  · Communication/Consultation:  None today  · Equipment provided today:  None today  · Recommendations/Intent for next treatment session: Next visit will focus on lower quarter strengthening.      Total Treatment Billable Duration:  55 minutes total time  PT Patient Time In/Time Out  Time In: 5154  Time Out: Elio Fishman, PT    Future Appointments   Date Time Provider Melody Puentes   8/3/2021  3:30 PM Ceci Chavis, PT [facility]   8/10/2021  3:30 PM Ceci Chavis, PT [facility]   8/11/2021  2:30 PM Ceci Cahvis, PT [facility]   8/16/2021  3:30 PM Ceci Chavis, PT [facility]   8/19/2021  3:30 PM Ceci Chavis, PT [facility]   8/23/2021  2:30 PM Ceci Chavis, PT [facility]   8/26/2021  2:30 PM Ceci Chavis, PT [facility]   8/30/2021  3:30 PM Ceci Chavis, PT [facility]   9/2/2021  3:30 PM Ceci Chavis, PT [facility]

## 2021-08-03 ENCOUNTER — HOSPITAL ENCOUNTER (OUTPATIENT)
Dept: PHYSICAL THERAPY | Age: 41
Discharge: HOME | End: 2021-08-03
Payer: COMMERCIAL

## 2021-08-03 PROCEDURE — 97110 THERAPEUTIC EXERCISES: CPT

## 2021-08-03 PROCEDURE — 97140 MANUAL THERAPY 1/> REGIONS: CPT

## 2021-08-03 NOTE — PROGRESS NOTES
Hugo Roldan  : 1980  Primary: [employer]  Secondary:  [facility] at [address], [address] [place] [address]  [phone]   [phone]        OUTPATIENT PHYSICAL THERAPY: Daily Treatment Note 8/3/2021  Visit Count:  24    ICD-10: Treatment Diagnosis: Pain in Joint, L knee [M25.562]; Difficulty Walking, Not Elsewhere Classified [R26.2]; Muscle Wasting and Atrophy, Not Elsewhere Classified, L thigh [M62.552]  Precautions/Allergies:   Sulfa (sulfonamide antibiotics) and Adhesive   TREATMENT PLAN:  Effective Dates: 2021 TO 2021. Frequency/Duration: 2 times a week for 12 weeks MEDICAL/REFERRING DIAGNOSIS:  Rupture of anterior cruciate ligament of left knee, initial encounter [S83.512A]  Acute medial meniscus tear, left, initial encounter [I39.108M]   DATE OF ONSET: 3/31/21  REFERRING PHYSICIAN: Mariam Santana MD MD Orders: Evaluation and Treat, Per Protocol; 2 x a week for 12 weeks  Return MD Appointment: 21       Pre-treatment Symptoms/Complaints:  Pt. Reports anterior knee pain remains aggravated. Pain is felt with day to day walking. Pain: Initial: Pain Intensity 1: 3 /10 Post Session:  2/10   Medications Last Reviewed:  8/3/2021  Updated Objective Findings:  None Today  TREATMENT:     Therapeutic Exercise: (40 Minutes):  Exercises per grid below to improve mobility, strength, balance and coordination. Required moderate visual, verbal and manual cues to promote proper body alignment, promote proper body posture and promote proper body mechanics. Progressed resistance, range and repetitions as indicated.      Date:  8/3/2021   Activity/Exercise Parameters   Knee extension ROM --   Golfers lift --   Knee flexion ROM --   Step ups front and lateral 8\" --   Stars 4 minutes   Bridges with LE lift --   SLR --   Calf stretch 3 minutes   Hip abduction --   clams --   Single LE eccentric squats on shuttle 3 x 10   Squats (eccentric control) 4 x 10   Shuttle squats (7 bands) 3 x 20   bike 5 minutes   TKE (purple band) 2 minutes   Lateral walking (red Band) 4 minutes   Calf raise --   Front and side planks --   Step down 2\" --   Manual Therapy (    Soft Tissue Mobilization Duration  Duration: 15 Minutes): Manual techniques to facilitate improved motion and decreased pain. (Used abbreviations: MET - muscle energy technique; PNF - proprioceptive neuromuscular facilitation; NMR - neuromuscular re-education; a/p - anterior to posterior; p/a - posterior to anterior)   · Joint mobilization: L patellofemoral all directions grade III-IV, shearing ant-post, post-ant grade III-IV, seated flexion with anterior tibial glide  · Soft tissue mobilization: L quadriceps, hamstrings and calf musculature. Game ready: 15 minutes (NOT PERFORMED)     [id number]  Treatment/Session Summary:    · Response to Treatment:  Progression of L quadriceps strengthening remains limited secondary to reports of anterior knee pain. PT suspects patellar tendon tenonitis developing. .  · Communication/Consultation:  None today  · Equipment provided today:  None today  · Recommendations/Intent for next treatment session: Next visit will focus on lower quarter strengthening.      Total Treatment Billable Duration:  55 minutes total time  PT Patient Time In/Time Out  Time In: 2640  Time Out: Elio Fishman, PT    Future Appointments   Date Time Provider Meoldy Puentes   8/10/2021  3:30 PM Braydon Merlos, PT [facility]   8/11/2021  2:30 PM Braydon Merlos, PT [facility]   8/16/2021  3:30 PM Braydon Merlos, PT [facility]   8/19/2021  3:30 PM Braydondipak Merlos, PT [facility]   8/23/2021  2:30 PM Braydon Merlos, PT [facility]   8/26/2021  2:30 PM Braydon Merlos, PT [facility]   8/30/2021  3:30 PM Braydondipak Merlos, PT [facility]   9/2/2021  3:30 PM Braydon Merlos, PT [facility]

## 2021-08-10 ENCOUNTER — HOSPITAL ENCOUNTER (OUTPATIENT)
Dept: PHYSICAL THERAPY | Age: 41
Discharge: HOME | End: 2021-08-10
Payer: COMMERCIAL

## 2021-08-11 ENCOUNTER — HOSPITAL ENCOUNTER (OUTPATIENT)
Dept: PHYSICAL THERAPY | Age: 41
Discharge: HOME | End: 2021-08-11
Payer: COMMERCIAL

## 2021-08-11 PROCEDURE — 97140 MANUAL THERAPY 1/> REGIONS: CPT

## 2021-08-11 PROCEDURE — 97110 THERAPEUTIC EXERCISES: CPT

## 2021-08-11 NOTE — PROGRESS NOTES
Hugo Roldan  : 1980  Primary: [employer]  Secondary:  [facility] at [address], [address] [place] [address]  [phone]   [phone]        OUTPATIENT PHYSICAL THERAPY: Daily Treatment Note 2021  Visit Count:  25    ICD-10: Treatment Diagnosis: Pain in Joint, L knee [M25.562]; Difficulty Walking, Not Elsewhere Classified [R26.2]; Muscle Wasting and Atrophy, Not Elsewhere Classified, L thigh [M62.552]  Precautions/Allergies:   Sulfa (sulfonamide antibiotics) and Adhesive   TREATMENT PLAN:  Effective Dates: 2021 TO 2021. Frequency/Duration: 2 times a week for 12 weeks MEDICAL/REFERRING DIAGNOSIS:  Rupture of anterior cruciate ligament of left knee, initial encounter [S83.512A]  Acute medial meniscus tear, left, initial encounter [I23.367J]   DATE OF ONSET: 3/31/21  REFERRING PHYSICIAN: Humphrey Kramer MD MD Orders: Evaluation and Treat, Per Protocol; 2 x a week for 12 weeks  Return MD Appointment: 21       Pre-treatment Symptoms/Complaints:  Pt. Notes some improvement in L anterior knee pain this week. Pain: Initial: Pain Intensity 1: 3 /10 Post Session:  210   Medications Last Reviewed:  2021  Updated Objective Findings:  L quadriceps strength 4+/5 MMT  TREATMENT:     Therapeutic Exercise: (40 Minutes):  Exercises per grid below to improve mobility, strength, balance and coordination. Required moderate visual, verbal and manual cues to promote proper body alignment, promote proper body posture and promote proper body mechanics. Progressed resistance, range and repetitions as indicated.      Date:  2021   Activity/Exercise Parameters   Knee extension ROM 2 minutes   Golfers lift --   Knee flexion ROM --   Step ups front and lateral 8\" --   Stars 4 minutes   Bridges with LE lift 3 x 10   SLR --   Calf stretch 3 minutes   Hip abduction --   clams --   Single LE eccentric squats on shuttle 3 x 10   Squats (eccentric control) 4 x 10   Shuttle squats (7 bands) 3 x 20   bike --   TKE (purple band) 2 minutes   Lateral walking (red Band) 4 minutes   Calf raise --   Front and side planks --   Mini unilateral squat with UE support 3 x 10   Manual Therapy (    Soft Tissue Mobilization Duration  Duration: 15 Minutes): Manual techniques to facilitate improved motion and decreased pain. (Used abbreviations: MET - muscle energy technique; PNF - proprioceptive neuromuscular facilitation; NMR - neuromuscular re-education; a/p - anterior to posterior; p/a - posterior to anterior)   · Joint mobilization: L patellofemoral all directions grade III-IV, shearing ant-post, post-ant grade III-IV, seated flexion with anterior tibial glide  · Soft tissue mobilization: L quadriceps, hamstrings and calf musculature. · Instrument assisted soft tissue mobilization: L quadriceps patellar tendon    Game ready: 15 minutes (NOT PERFORMED)     [employer] Portal  Treatment/Session Summary:    · Response to Treatment:  Pt. is able to tolerate increased quadriceps strengthening in closed chain with less complaints of anterior knee pain today. Instrument assisted soft tissue mobilizaiton is tolerated today without complaints. .  · Communication/Consultation:  None today  · Equipment provided today:  None today  · Recommendations/Intent for next treatment session: Next visit will focus on lower quarter strengthening.      Total Treatment Billable Duration:  55 minutes total time  PT Patient Time In/Time Out  Time In: 1430  Time Out: 315 Jimmy Luis, PT    Future Appointments   Date Time Provider Melody Puentes   8/16/2021  3:30 PM Milus Span, PT [facility]   8/19/2021  3:30 PM Milus Span, PT [facility]   8/23/2021  2:30 PM Milus Span, PT [facility]   8/26/2021  2:30 PM Milus Span, PT [facility]   8/30/2021  3:30 PM Milus Span, PT [facility]   9/2/2021  3:30 PM Milus Span, PT [facility]

## 2021-08-11 NOTE — PROGRESS NOTES
Hugo Roldan  : 1980  Payor: Neftali Rosales / Plan: Lucy Villatoro PPO / Product Type: PPO /  [address]  at [facility], [address] [facility]  [phone]   [phone]          DATE: 8/10/2021    Patient cancelled appointment today due to work conflict. Will plan to follow up on next scheduled visit.     Cleve Prader, PT, DPT, OCS

## 2021-08-30 ENCOUNTER — APPOINTMENT (OUTPATIENT)
Dept: PHYSICAL THERAPY | Age: 41
End: 2021-08-30
Payer: COMMERCIAL

## 2021-09-02 ENCOUNTER — APPOINTMENT (OUTPATIENT)
Dept: PHYSICAL THERAPY | Age: 41
End: 2021-09-02

## 2022-03-18 PROBLEM — T78.2XXA ANAPHYLAXIS: Status: ACTIVE | Noted: 2019-06-25

## 2022-03-18 PROBLEM — K59.04 CHRONIC IDIOPATHIC CONSTIPATION: Status: ACTIVE | Noted: 2020-11-20

## 2022-03-18 PROBLEM — T63.441A: Status: ACTIVE | Noted: 2019-06-25

## 2022-03-18 PROBLEM — S83.242A ACUTE MEDIAL MENISCUS TEAR, LEFT, INITIAL ENCOUNTER: Status: ACTIVE | Noted: 2021-03-09

## 2022-03-18 PROBLEM — Z09 SURGERY FOLLOW-UP: Status: ACTIVE | Noted: 2021-04-12

## 2022-03-18 PROBLEM — L30.9 PERIORBITAL DERMATITIS: Status: ACTIVE | Noted: 2019-06-25

## 2022-03-18 PROBLEM — F41.9 ANXIETY: Status: ACTIVE | Noted: 2021-04-21

## 2022-03-18 PROBLEM — T78.3XXA ANGIO-EDEMA: Status: ACTIVE | Noted: 2019-06-25

## 2022-03-18 PROBLEM — M79.662 PAIN OF LEFT CALF: Status: ACTIVE | Noted: 2021-04-27

## 2022-03-19 PROBLEM — M79.641 PAIN IN BOTH HANDS: Status: ACTIVE | Noted: 2021-04-21

## 2022-03-19 PROBLEM — S83.512A LEFT ANTERIOR CRUCIATE LIGAMENT TEAR: Status: ACTIVE | Noted: 2021-03-22

## 2022-03-19 PROBLEM — Z00.00 PHYSICAL EXAM, ROUTINE: Status: ACTIVE | Noted: 2021-04-20

## 2022-03-19 PROBLEM — R76.8 POSITIVE ANA (ANTINUCLEAR ANTIBODY): Status: ACTIVE | Noted: 2021-04-21

## 2022-03-19 PROBLEM — S83.519A ACL (ANTERIOR CRUCIATE LIGAMENT) RUPTURE: Status: ACTIVE | Noted: 2021-03-09

## 2022-03-19 PROBLEM — L50.8 CHRONIC URTICARIA: Status: ACTIVE | Noted: 2019-06-25

## 2022-03-19 PROBLEM — M79.642 PAIN IN BOTH HANDS: Status: ACTIVE | Noted: 2021-04-21

## 2022-03-19 PROBLEM — G47.00 INSOMNIA: Status: ACTIVE | Noted: 2021-04-21

## 2022-03-19 PROBLEM — Z80.0 FAMILY HISTORY OF COLON CANCER: Status: ACTIVE | Noted: 2020-11-20

## 2022-03-19 PROBLEM — I82.4Z2 ACUTE DEEP VEIN THROMBOSIS (DVT) OF DISTAL VEIN OF LEFT LOWER EXTREMITY (HCC): Status: ACTIVE | Noted: 2021-05-06

## 2022-03-19 PROBLEM — M25.562 LEFT KNEE PAIN: Status: ACTIVE | Noted: 2021-03-16

## 2023-09-18 ENCOUNTER — OFFICE VISIT (OUTPATIENT)
Dept: ORTHOPEDIC SURGERY | Age: 43
End: 2023-09-18
Payer: COMMERCIAL

## 2023-09-18 DIAGNOSIS — S62.640A CLOSED NONDISPLACED FRACTURE OF PROXIMAL PHALANX OF RIGHT INDEX FINGER, INITIAL ENCOUNTER: Primary | ICD-10-CM

## 2023-09-18 PROCEDURE — 99204 OFFICE O/P NEW MOD 45 MIN: CPT | Performed by: NURSE PRACTITIONER

## 2023-09-18 NOTE — H&P (VIEW-ONLY)
Orthopaedic Hand Clinic Note    Name: Gil Tompkins  YOB: 1980  Gender: female  MRN: [id number]      CC: Patient referred for evaluation of right index finger pain    HPI: Gil Tompkins is a 38 y.o. female right hand dominant with a chief complaint of right index finger injury. She reports on Saturday she tripped over some wire dog fencing. She fell and injured her right index finger. She was seen at [facility] urgent care. She was x-rayed. She is placed in a splint and referred to orthopedics for follow-up. ROS/Meds/PSH/PMH/FH/SH: I personally reviewed the patients standard intake form. Pertinents are discussed in the HPI    Physical Examination:  General: Awake and alert. HEENT: Normocephalic, atraumatic  CV/Pulm: Breathing even and unlabored  Skin: No obvious rashes noted. Lymphatic: No obvious evidence of lymphedema or lymphadenopathy    Musculoskeletal Exam:  Examination on the right upper extremity demonstrates cap refill < 5 seconds in all fingers  Patient has swelling and ecchymosis to the right index finger. She has decreased range of motion secondary to pain. She denies paresthesia. She has normal capillary refill. Imaging / Electrodiagnostic Tests:     X-rays include a three-view right hand are reviewed. Patient has a comminuted fracture of the proximal phalange of the right index finger. Assessment:   1. Closed nondisplaced fracture of proximal phalanx of right index finger, initial encounter        Plan:   We discussed the diagnosis and different treatment options. We discussed observation, therapy, antiinflammatory medications and other pertinent treatment modalities. After discussing in detail the patient elects to proceed with surgical intervention. Risk and benefits of surgery were addressed in detail the patient progenitors and wishes to proceed we will get surgery set up for Monday as an outpatient. We did discuss therapy after surgery.   We also discussed

## 2023-09-19 DIAGNOSIS — S62.640A CLOSED NONDISPLACED FRACTURE OF PROXIMAL PHALANX OF RIGHT INDEX FINGER, INITIAL ENCOUNTER: Primary | ICD-10-CM

## 2023-09-19 PROBLEM — S62.614A CLOSED DISPLACED FRACTURE OF PROXIMAL PHALANX OF RIGHT RING FINGER: Status: ACTIVE | Noted: 2023-09-19

## 2023-09-20 RX ORDER — EPINEPHRINE 0.3 MG/.3ML
0.3 INJECTION SUBCUTANEOUS PRN
COMMUNITY
Start: 2018-03-01

## 2023-09-20 RX ORDER — TIRZEPATIDE 2.5 MG/.5ML
2.5 INJECTION, SOLUTION SUBCUTANEOUS WEEKLY
COMMUNITY

## 2023-09-20 RX ORDER — ALPRAZOLAM 0.25 MG/1
0.25 TABLET ORAL DAILY PRN
COMMUNITY
Start: 2022-11-18

## 2023-09-20 NOTE — PERIOP NOTE
Patient verified name and . Order for consent NOT found in EHR; patient states procedure not correct - it is right index finger and not right ring finger. Scheduling notified and correction to case in process. Right index finger noted on OV notes from 2023. Dajuan Flakes in surgery scheduling notified of incorrect digit on posting. Type 1B surgery, phone assessment complete. Orders NOT received. Labs per surgeon: Unknown  Labs per anesthesia protocol: None per protocol    Patient answered medical/surgical history questions at their best of ability. All prior to admission medications documented in [facility]. If you have not heard from [address] by 2 pm, please call [phone]. Clear liquids only day before surgery 2023, and then No food or drink after midnight night before surgery, which includes any gum, mints, candy, or ice chips. Patient instructed to take the following medications the day of surgery according to anesthesia guidelines with a small sip of water: xanax if needed DOS. On the day before surgery please take Acetaminophen 1000mg in the morning and then again before bed. You may substitute for Tylenol 650 mg. Hold all vitamins 7 days prior to surgery and NSAIDS 5 days prior to surgery. Prescription meds to hold: last dose mounjaro taken 9/15/2023 which will be 10 days from surgery; patient instructed to be clear liquids only day before surgery (see above instructions).      Patient instructed on the following:    > Arrive at [facility], time of arrival to be called the day before by 1700  > NPO after midnight, unless otherwise indicated, including gum, mints, and ice chips  > Responsible adult must drive patient to the hospital, stay during surgery, and patient will need supervision 24 hours after anesthesia  > Use antibacterial soap in shower the night before surgery and on the morning of surgery  > All piercings must be removed prior to arrival.    >

## 2023-09-20 NOTE — PERIOP NOTE
Dear Mrs. Monroy,      Thank you for completing your phone assessment with me today. Here are your requested surgery instructions. Please call #[phone] with any questions/concerns. Your surgery is scheduled at [facility]: [address], [id number]. Please arrive at Outpatient Entrance. The Pre-op department (#[phone] for Outpatient) will call you on the business day before your surgery with your arrival time. If you have any questions on the day of surgery, please call the pre-op department at the telephone number above. If you have not received a telephone call from Outpatient by 3 pm the business day before surgery, please call Outpatient # listed above. If you are sick the day of surgery: fever >100 deg F, coughing up colored mucus, or have abdominal sickness (intractable nausea, vomiting or diarrhea) please call [phone] the day of surgery as early as possible and speak to a nurse about your symptoms. They will advise you on next steps. Clear liquids day before surgery ONLY, and then No food or drink after midnight which includes any gum, mints, candy, or ice chips. Please take these medications on the morning of surgery with a small sip of water: xanax as needed day of surgery with water sips only. On the day before surgery take Acetaminophen 1000mg in the morning and at bedtime OR Acetaminophen 650mg in the morning, afternoon and bedtime. Please stop all vitamins/supplements 7 days prior to surgery and stop all NSAIDS (ibuprofen, naproxen, aleve, motrin, advil) 5 days before your surgery. A responsible adult must drive you to the hospital, remain in the building during surgery and you will need adult supervision for 24 hours after anesthesia. Please use an antibacterial soap (Dial, Safeguard, etc.) the night before surgery and on the morning of surgery. Change sheets on your bed night before surgery.    Do NOT wear: deodorant,

## 2023-09-22 DIAGNOSIS — S62.640A CLOSED NONDISPLACED FRACTURE OF PROXIMAL PHALANX OF RIGHT INDEX FINGER, INITIAL ENCOUNTER: Primary | ICD-10-CM

## 2023-09-22 NOTE — PERIOP NOTE
Preop department called to notify patient of arrival time for scheduled procedure. Instructions given to   - Arrive at [facility]. - Remain NPO after midnight, unless otherwise indicated, including gum, mints, and ice chips. - Have a responsible adult to drive patient to the hospital, stay during surgery, and patient will need supervision 24 hours after anesthesia. - Use antibacterial soap in shower the night before surgery and on the morning of surgery.        Was patient contacted: no  Voicemail left: yes  Numbers contacted: [phone]   Arrival time: 0800

## 2023-09-24 ENCOUNTER — ANESTHESIA EVENT (OUTPATIENT)
Dept: SURGERY | Age: 43
End: 2023-09-24
Payer: COMMERCIAL

## 2023-09-24 RX ORDER — HYDROMORPHONE HYDROCHLORIDE 2 MG/ML
0.5 INJECTION, SOLUTION INTRAMUSCULAR; INTRAVENOUS; SUBCUTANEOUS EVERY 5 MIN PRN
Status: CANCELLED | OUTPATIENT
Start: 2023-09-24

## 2023-09-24 RX ORDER — DEXTROSE MONOHYDRATE 100 MG/ML
INJECTION, SOLUTION INTRAVENOUS CONTINUOUS PRN
Status: CANCELLED | OUTPATIENT
Start: 2023-09-24

## 2023-09-24 RX ORDER — SODIUM CHLORIDE 0.9 % (FLUSH) 0.9 %
5-40 SYRINGE (ML) INJECTION EVERY 12 HOURS SCHEDULED
Status: CANCELLED | OUTPATIENT
Start: 2023-09-24

## 2023-09-24 RX ORDER — HALOPERIDOL 5 MG/ML
1 INJECTION INTRAMUSCULAR
Status: CANCELLED | OUTPATIENT
Start: 2023-09-24 | End: 2023-09-25

## 2023-09-24 RX ORDER — SODIUM CHLORIDE 9 MG/ML
INJECTION, SOLUTION INTRAVENOUS PRN
Status: CANCELLED | OUTPATIENT
Start: 2023-09-24

## 2023-09-24 RX ORDER — SODIUM CHLORIDE, SODIUM LACTATE, POTASSIUM CHLORIDE, CALCIUM CHLORIDE 600; 310; 30; 20 MG/100ML; MG/100ML; MG/100ML; MG/100ML
INJECTION, SOLUTION INTRAVENOUS CONTINUOUS
Status: CANCELLED | OUTPATIENT
Start: 2023-09-24

## 2023-09-24 RX ORDER — SODIUM CHLORIDE 0.9 % (FLUSH) 0.9 %
5-40 SYRINGE (ML) INJECTION PRN
Status: CANCELLED | OUTPATIENT
Start: 2023-09-24

## 2023-09-24 RX ORDER — OXYCODONE HYDROCHLORIDE 5 MG/1
5 TABLET ORAL
Status: CANCELLED | OUTPATIENT
Start: 2023-09-24 | End: 2023-09-25

## 2023-09-24 RX ORDER — DIPHENHYDRAMINE HYDROCHLORIDE 50 MG/ML
12.5 INJECTION INTRAMUSCULAR; INTRAVENOUS
Status: CANCELLED | OUTPATIENT
Start: 2023-09-24 | End: 2023-09-25

## 2023-09-24 RX ORDER — PROCHLORPERAZINE EDISYLATE 5 MG/ML
5 INJECTION INTRAMUSCULAR; INTRAVENOUS
Status: CANCELLED | OUTPATIENT
Start: 2023-09-24 | End: 2023-09-25

## 2023-09-25 ENCOUNTER — APPOINTMENT (OUTPATIENT)
Dept: GENERAL RADIOLOGY | Age: 43
End: 2023-09-25
Attending: ORTHOPAEDIC SURGERY
Payer: COMMERCIAL

## 2023-09-25 ENCOUNTER — ANESTHESIA (OUTPATIENT)
Dept: SURGERY | Age: 43
End: 2023-09-25
Payer: COMMERCIAL

## 2023-09-25 ENCOUNTER — HOSPITAL ENCOUNTER (OUTPATIENT)
Age: 43
Setting detail: OUTPATIENT SURGERY
Discharge: HOME | End: 2023-09-25
Attending: ORTHOPAEDIC SURGERY | Admitting: ORTHOPAEDIC SURGERY
Payer: COMMERCIAL

## 2023-09-25 VITALS
RESPIRATION RATE: 18 BRPM | HEART RATE: 81 BPM | WEIGHT: 158 LBS | HEIGHT: 67 IN | OXYGEN SATURATION: 98 % | DIASTOLIC BLOOD PRESSURE: 74 MMHG | TEMPERATURE: 97.7 F | BODY MASS INDEX: 24.8 KG/M2 | SYSTOLIC BLOOD PRESSURE: 107 MMHG

## 2023-09-25 PROCEDURE — 3700000001 HC ADD 15 MINUTES (ANESTHESIA): Performed by: ORTHOPAEDIC SURGERY

## 2023-09-25 PROCEDURE — 6360000002 HC RX W HCPCS: Performed by: ANESTHESIOLOGY

## 2023-09-25 PROCEDURE — 3600000012 HC SURGERY LEVEL 2 ADDTL 15MIN: Performed by: ORTHOPAEDIC SURGERY

## 2023-09-25 PROCEDURE — 7100000000 HC PACU RECOVERY - FIRST 15 MIN: Performed by: ORTHOPAEDIC SURGERY

## 2023-09-25 PROCEDURE — 64415 NJX AA&/STRD BRCH PLXS IMG: CPT | Performed by: ANESTHESIOLOGY

## 2023-09-25 PROCEDURE — 2500000003 HC RX 250 WO HCPCS: Performed by: ANESTHESIOLOGY

## 2023-09-25 PROCEDURE — 6360000002 HC RX W HCPCS

## 2023-09-25 PROCEDURE — 2580000003 HC RX 258

## 2023-09-25 PROCEDURE — 6370000000 HC RX 637 (ALT 250 FOR IP): Performed by: ANESTHESIOLOGY

## 2023-09-25 PROCEDURE — C1713 ANCHOR/SCREW BN/BN,TIS/BN: HCPCS | Performed by: ORTHOPAEDIC SURGERY

## 2023-09-25 PROCEDURE — 3600000002 HC SURGERY LEVEL 2 BASE: Performed by: ORTHOPAEDIC SURGERY

## 2023-09-25 PROCEDURE — 7100000011 HC PHASE II RECOVERY - ADDTL 15 MIN: Performed by: ORTHOPAEDIC SURGERY

## 2023-09-25 PROCEDURE — 26746 TREAT FINGER FRACTURE EACH: CPT | Performed by: ORTHOPAEDIC SURGERY

## 2023-09-25 PROCEDURE — 2500000003 HC RX 250 WO HCPCS

## 2023-09-25 PROCEDURE — 2580000003 HC RX 258: Performed by: ANESTHESIOLOGY

## 2023-09-25 PROCEDURE — 2709999900 HC NON-CHARGEABLE SUPPLY: Performed by: ORTHOPAEDIC SURGERY

## 2023-09-25 PROCEDURE — 7100000001 HC PACU RECOVERY - ADDTL 15 MIN: Performed by: ORTHOPAEDIC SURGERY

## 2023-09-25 PROCEDURE — 6360000002 HC RX W HCPCS: Performed by: NURSE PRACTITIONER

## 2023-09-25 PROCEDURE — 7100000010 HC PHASE II RECOVERY - FIRST 15 MIN: Performed by: ORTHOPAEDIC SURGERY

## 2023-09-25 PROCEDURE — 3700000000 HC ANESTHESIA ATTENDED CARE: Performed by: ORTHOPAEDIC SURGERY

## 2023-09-25 DEVICE — SCREW BNE L13MM DIA1.5MM HND 316L S STL ST VAR ANG LOK T4: Type: IMPLANTABLE DEVICE | Site: INDEX FINGER | Status: FUNCTIONAL

## 2023-09-25 DEVICE — SCREW BNE L8MM DIA1.5MM CORT S STL ST FULL THRD T4 STARDRV: Type: IMPLANTABLE DEVICE | Site: INDEX FINGER | Status: FUNCTIONAL

## 2023-09-25 DEVICE — SCREW BNE L6MM DIA1.5MM CORT S STL ST FULL THRD T4 STARDRV: Type: IMPLANTABLE DEVICE | Site: INDEX FINGER | Status: FUNCTIONAL

## 2023-09-25 DEVICE — SCREW BNE L14MM DIA1.5MM HND 316L S STL ST VAR ANG LOK T4: Type: IMPLANTABLE DEVICE | Site: INDEX FINGER | Status: FUNCTIONAL

## 2023-09-25 DEVICE — SCREW BNE L12MM DIA1.5MM HND 316L S STL ST VAR ANG LOK T4: Type: IMPLANTABLE DEVICE | Site: INDEX FINGER | Status: FUNCTIONAL

## 2023-09-25 DEVICE — SCREW BNE L7MM DIA1.5MM CORT S STL ST FULL THRD T4 STARDRV: Type: IMPLANTABLE DEVICE | Site: INDEX FINGER | Status: FUNCTIONAL

## 2023-09-25 DEVICE — PLATE BNE L42MM THK1MM 3X7 H HND 316L S STL T SHP VAR ANG: Type: IMPLANTABLE DEVICE | Site: INDEX FINGER | Status: FUNCTIONAL

## 2023-09-25 RX ORDER — ACETAMINOPHEN 500 MG
1000 TABLET ORAL ONCE
Status: COMPLETED | OUTPATIENT
Start: 2023-09-25 | End: 2023-09-25

## 2023-09-25 RX ORDER — MIDAZOLAM HYDROCHLORIDE 2 MG/2ML
2 INJECTION, SOLUTION INTRAMUSCULAR; INTRAVENOUS
Status: COMPLETED | OUTPATIENT
Start: 2023-09-25 | End: 2023-09-25

## 2023-09-25 RX ORDER — LIDOCAINE HYDROCHLORIDE 10 MG/ML
1 INJECTION, SOLUTION INFILTRATION; PERINEURAL
Status: DISCONTINUED | OUTPATIENT
Start: 2023-09-25 | End: 2023-09-25 | Stop reason: HOSPADM

## 2023-09-25 RX ORDER — SODIUM CHLORIDE 9 MG/ML
INJECTION, SOLUTION INTRAVENOUS PRN
Status: DISCONTINUED | OUTPATIENT
Start: 2023-09-25 | End: 2023-09-25 | Stop reason: HOSPADM

## 2023-09-25 RX ORDER — LIDOCAINE HCL/EPINEPHRINE/PF 2%-1:200K
VIAL (ML) INJECTION PRN
Status: DISCONTINUED | OUTPATIENT
Start: 2023-09-25 | End: 2023-09-25 | Stop reason: SDUPTHER

## 2023-09-25 RX ORDER — SODIUM CHLORIDE 0.9 % (FLUSH) 0.9 %
5-40 SYRINGE (ML) INJECTION PRN
Status: DISCONTINUED | OUTPATIENT
Start: 2023-09-25 | End: 2023-09-25 | Stop reason: HOSPADM

## 2023-09-25 RX ORDER — PROPOFOL 10 MG/ML
INJECTION, EMULSION INTRAVENOUS PRN
Status: DISCONTINUED | OUTPATIENT
Start: 2023-09-25 | End: 2023-09-25 | Stop reason: SDUPTHER

## 2023-09-25 RX ORDER — FENTANYL CITRATE 50 UG/ML
100 INJECTION, SOLUTION INTRAMUSCULAR; INTRAVENOUS
Status: COMPLETED | OUTPATIENT
Start: 2023-09-25 | End: 2023-09-25

## 2023-09-25 RX ORDER — ONDANSETRON 4 MG/1
4 TABLET, FILM COATED ORAL EVERY 8 HOURS PRN
Qty: 20 TABLET | Refills: 0 | Status: SHIPPED | OUTPATIENT
Start: 2023-09-25

## 2023-09-25 RX ORDER — ROPIVACAINE HYDROCHLORIDE 5 MG/ML
INJECTION, SOLUTION EPIDURAL; INFILTRATION; PERINEURAL
Status: COMPLETED | OUTPATIENT
Start: 2023-09-25 | End: 2023-09-25

## 2023-09-25 RX ORDER — SODIUM CHLORIDE 0.9 % (FLUSH) 0.9 %
5-40 SYRINGE (ML) INJECTION EVERY 12 HOURS SCHEDULED
Status: DISCONTINUED | OUTPATIENT
Start: 2023-09-25 | End: 2023-09-25 | Stop reason: HOSPADM

## 2023-09-25 RX ORDER — OXYCODONE HYDROCHLORIDE 5 MG/1
5 TABLET ORAL EVERY 6 HOURS PRN
Qty: 20 TABLET | Refills: 0 | Status: SHIPPED | OUTPATIENT
Start: 2023-09-25 | End: 2023-09-30

## 2023-09-25 RX ORDER — DEXAMETHASONE SODIUM PHOSPHATE 10 MG/ML
INJECTION, SOLUTION INTRAMUSCULAR; INTRAVENOUS
Status: COMPLETED | OUTPATIENT
Start: 2023-09-25 | End: 2023-09-25

## 2023-09-25 RX ORDER — LIDOCAINE HYDROCHLORIDE 20 MG/ML
INJECTION, SOLUTION EPIDURAL; INFILTRATION; INTRACAUDAL; PERINEURAL PRN
Status: DISCONTINUED | OUTPATIENT
Start: 2023-09-25 | End: 2023-09-25 | Stop reason: SDUPTHER

## 2023-09-25 RX ORDER — SODIUM CHLORIDE, SODIUM LACTATE, POTASSIUM CHLORIDE, CALCIUM CHLORIDE 600; 310; 30; 20 MG/100ML; MG/100ML; MG/100ML; MG/100ML
INJECTION, SOLUTION INTRAVENOUS CONTINUOUS
Status: DISCONTINUED | OUTPATIENT
Start: 2023-09-25 | End: 2023-09-25 | Stop reason: HOSPADM

## 2023-09-25 RX ADMIN — PHENYLEPHRINE HYDROCHLORIDE 50 MCG: 10 INJECTION INTRAVENOUS at 10:28

## 2023-09-25 RX ADMIN — DEXAMETHASONE SODIUM PHOSPHATE 4 MG: 10 INJECTION, SOLUTION INTRAMUSCULAR; INTRAVENOUS at 08:33

## 2023-09-25 RX ADMIN — LIDOCAINE HYDROCHLORIDE,EPINEPHRINE BITARTRATE 10 ML: 20; .005 INJECTION, SOLUTION EPIDURAL; INFILTRATION; INTRACAUDAL; PERINEURAL at 08:33

## 2023-09-25 RX ADMIN — ACETAMINOPHEN 1000 MG: 500 TABLET, FILM COATED ORAL at 08:11

## 2023-09-25 RX ADMIN — PROPOFOL 30 MG: 10 INJECTION, EMULSION INTRAVENOUS at 10:00

## 2023-09-25 RX ADMIN — PROPOFOL 50 MG: 10 INJECTION, EMULSION INTRAVENOUS at 10:09

## 2023-09-25 RX ADMIN — PROPOFOL 50 MG: 10 INJECTION, EMULSION INTRAVENOUS at 09:50

## 2023-09-25 RX ADMIN — Medication 2000 MG: at 09:55

## 2023-09-25 RX ADMIN — MIDAZOLAM 2 MG: 1 INJECTION INTRAMUSCULAR; INTRAVENOUS at 08:33

## 2023-09-25 RX ADMIN — LIDOCAINE HYDROCHLORIDE 60 MG: 20 INJECTION, SOLUTION EPIDURAL; INFILTRATION; INTRACAUDAL; PERINEURAL at 09:50

## 2023-09-25 RX ADMIN — SODIUM CHLORIDE, POTASSIUM CHLORIDE, SODIUM LACTATE AND CALCIUM CHLORIDE: 600; 310; 30; 20 INJECTION, SOLUTION INTRAVENOUS at 08:12

## 2023-09-25 RX ADMIN — ROPIVACAINE HYDROCHLORIDE 20 ML: 5 INJECTION, SOLUTION EPIDURAL; INFILTRATION; PERINEURAL at 08:33

## 2023-09-25 RX ADMIN — FENTANYL CITRATE 100 MCG: 50 INJECTION, SOLUTION INTRAMUSCULAR; INTRAVENOUS at 08:33

## 2023-09-25 RX ADMIN — PHENYLEPHRINE HYDROCHLORIDE 50 MCG: 10 INJECTION INTRAVENOUS at 10:31

## 2023-09-25 RX ADMIN — PROPOFOL 125 MCG/KG/MIN: 10 INJECTION, EMULSION INTRAVENOUS at 09:52

## 2023-09-25 ASSESSMENT — PAIN - FUNCTIONAL ASSESSMENT: PAIN_FUNCTIONAL_ASSESSMENT: 0-10

## 2023-09-25 NOTE — DISCHARGE INSTRUCTIONS
contraceptives as your primary form of birth control. In addition to this, we recommend continuing your oral contraceptive as prescribed, unless otherwise instructed by your physician, during this time    After general anesthesia or intravenous sedation, for 24 hours or while taking prescription Narcotics:  Limit your activities  Some people will feel drowsy or dizzy for up to a few hours after waking up. A responsible adult needs to be with you for the next 24 hours  Do not drive and operate hazardous machinery  Do not make important personal or business decisions  Do not drink alcoholic beverages  If you have not urinated within 8 hours after discharge, and you are experiencing discomfort from urinary retention, please go to the nearest ED. If you have sleep apnea and have a CPAP machine, please use it for all naps and sleeping. Please use caution when taking narcotics and any of your home medications that may cause drowsiness. *  Please give a list of your current medications to your Primary Care Provider. *  Please update this list whenever your medications are discontinued, doses are      changed, or new medications (including over-the-counter products) are added. *  Please carry medication information at all times in case of emergency situations. These are general instructions for a healthy lifestyle:  No smoking/ No tobacco products/ Avoid exposure to second hand smoke  Surgeon General's Warning:  Quitting smoking now greatly reduces serious risk to your health.   Obesity, smoking, and sedentary lifestyle greatly increases your risk for illness  A healthy diet, regular physical exercise & weight monitoring are important for maintaining a healthy lifestyle    You may be retaining fluid if you have a history of heart failure or if you experience any of the following symptoms:  Weight gain of 3 pounds or more overnight or 5 pounds in a week, increased swelling in our hands or feet or shortness of breath

## 2023-09-25 NOTE — PERIOP NOTE
PACU DISCHARGE NOTE    Pt and significant other verbalized understanding of discharge inst. Pt tolerated po fluids well. Vital signs stable, pain well controlled, alert and oriented times three or at baseline, follow up per surgeon, no anesthetic complications.

## 2023-09-25 NOTE — ANESTHESIA PROCEDURE NOTES
Peripheral Block    Patient location during procedure: pre-op  Reason for block: post-op pain management and at surgeon's request  Start time: 9/25/2023 8:33 AM  End time: 9/25/2023 8:37 AM  Staffing  Performed: anesthesiologist   Anesthesiologist: Kolton Johnson MD  Performed by: Kolton Johnson MD  Authorized by: Kolton Johnson MD    Preanesthetic Checklist  Completed: patient identified, IV checked, site marked, risks and benefits discussed, surgical/procedural consents, equipment checked, pre-op evaluation, timeout performed, anesthesia consent given, oxygen available and monitors applied/VS acknowledged  Peripheral Block   Patient position: sitting  Prep: ChloraPrep  Provider prep: sterile gloves and mask  Patient monitoring: cardiac monitor, continuous pulse ox, frequent blood pressure checks, IV access, oxygen and responsive to questions  Block type: Brachial plexus  Supraclavicular  Laterality: right  Injection technique: single-shot  Guidance: ultrasound guided    Needle   Needle type: insulated echogenic nerve stimulator needle   Needle gauge: 20 G  Needle localization: ultrasound guidance  Needle length: 10 cm  Assessment   Injection assessment: negative aspiration for heme, no paresthesia on injection, local visualized surrounding nerve on ultrasound and no intravascular symptoms  Paresthesia pain: none  Slow fractionated injection: yes  Hemodynamics: stable  Real-time US image taken/store: yes  Outcomes: uncomplicated and patient tolerated procedure well    Additional Notes  Ultrasound image taken and stored in chart     20cc 0.5% Ropi + 4mg Decadron + 10cc 2% Lido with Epi  Medications Administered  dexamethasone (DECADRON) (PF) 10 mg/mL injection - Other   4 mg - 9/25/2023 8:33:00 AM  ropivacaine (NAROPIN) injection 0.5% - Perineural   20 mL - 9/25/2023 8:33:00 AM

## 2023-09-25 NOTE — PERIOP NOTE
Belongings sent to security. Patient's father in car due to health concerns. Pt stated it would be easier for him to stay in car until procedure finished.

## 2023-09-25 NOTE — ANESTHESIA POSTPROCEDURE EVALUATION
Department of Anesthesiology  Postprocedure Note    Patient: Gil Tompkins  MRN: [id number]  YOB: 1980  Date of evaluation: 9/25/2023      Procedure Summary     Date: 09/25/23 Room / Location: [facility] OP OR 06 / [facility]    Anesthesia Start: 0946 Anesthesia Stop: 6716    Procedure: ORIF right index finger (Right: Index Finger) Diagnosis:       Closed displaced fracture of proximal phalanx of right ring finger      (Closed displaced fracture of proximal phalanx of right ring finger [S62.614A])    Surgeons: Luzma Perla MD Responsible Provider: Joey Montesinos MD    Anesthesia Type: TIVA ASA Status: 2          Anesthesia Type: TIVA    Carmen Phase I: Carmen Score: 7    Carmen Phase II:        Anesthesia Post Evaluation    Patient location during evaluation: PACU  Patient participation: complete - patient participated  Level of consciousness: awake and alert  Airway patency: patent  Nausea: well controlled. Complications: no  Cardiovascular status: acceptable.   Respiratory status: acceptable  Hydration status: stable  Pain management: adequate

## 2023-09-25 NOTE — INTERVAL H&P NOTE
H&P Update:  Mulugeta Monroy was seen and examined. History and physical has been reviewed. The patient has been examined.  There have been no significant clinical changes since the completion of the originally dated History and Physical.    Ashtyn Lawson MD  Orthopaedic Surgery  09/25/23  8:05 AM

## 2023-09-26 NOTE — OP NOTE
STAFF:    Circulator: Marley Valente RN  Scrub Person First: Sanjeev Santiago     ESTIMATED BLOOD LOSS: Minimal       TOTAL IV FLUIDS : See anesthesia note    COMPLICATIONS: None     DRAINS:       TOURNIQUET TIME:   Total Tourniquet Time Documented:  Arm  (Right) - 40 minutes  Total: Arm  (Right) - 40 minutes       INDICATION FOR PROCEDURE:     Mulugeta Monroy sustained a comminuted articular fracture of the right index finger proximal phalanx. Surgical and non-surgical treatment options were discussed with the patient and their family, as well as the risk and benefits of each option. After thorough discussion, the patient decided to proceed with surgical management. Specific to this treatment plan, we discussed in detail surgical risks including scar, pain, bleeding, infection, anesthetic risks, neurovascular injury, failure to achieve desired results, hardware problems, need for further surgery,  weakness, stiffness, risk of death and potential risk of other unforseen complication. The patient consented to the procedure after discussion of the risks and benefits. DESCRIPTION OF PROCEDURE:     The patient was identified in the holding room. The right index finger was marked and confirmed as the correct operative site. They were then brought to the OR and transferred onto the OR table in the supine position. All bony prominences were well padded. SCDs were placed on the bilateral legs throughout the case. A timeout was performed, verifying the correct patient, the correct side  and the correct procedure. Antibiotics were then administered, and were redosed during the procedure as needed at indicated intervals. A non-sterile tourniquet was placed on the arm. The upper extremity was pre scrubbed and then prepped and draped in routine sterile fashion. An incisional timeout was performed re-confirming the correct patient, surgical site and procedure, as well as verifying antibiotics.

## 2023-10-06 ENCOUNTER — EVALUATION (OUTPATIENT)
Age: 43
End: 2023-10-06
Payer: COMMERCIAL

## 2023-10-06 ENCOUNTER — OFFICE VISIT (OUTPATIENT)
Dept: ORTHOPEDIC SURGERY | Age: 43
End: 2023-10-06

## 2023-10-06 DIAGNOSIS — S62.640A CLOSED NONDISPLACED FRACTURE OF PROXIMAL PHALANX OF RIGHT INDEX FINGER, INITIAL ENCOUNTER: Primary | ICD-10-CM

## 2023-10-06 DIAGNOSIS — S62.640A CLOSED NONDISPLACED FRACTURE OF PROXIMAL PHALANX OF RIGHT INDEX FINGER, INITIAL ENCOUNTER: ICD-10-CM

## 2023-10-06 DIAGNOSIS — M79.641 PAIN OF RIGHT HAND: Primary | ICD-10-CM

## 2023-10-06 PROCEDURE — 97110 THERAPEUTIC EXERCISES: CPT | Performed by: OCCUPATIONAL THERAPIST

## 2023-10-06 PROCEDURE — L3913 HFO W/O JOINTS CF: HCPCS | Performed by: OCCUPATIONAL THERAPIST

## 2023-10-06 PROCEDURE — 97165 OT EVAL LOW COMPLEX 30 MIN: CPT | Performed by: OCCUPATIONAL THERAPIST

## 2023-10-06 RX ORDER — HYDROCODONE BITARTRATE AND ACETAMINOPHEN 5; 325 MG/1; MG/1
1 TABLET ORAL EVERY 6 HOURS PRN
Qty: 15 TABLET | Refills: 0 | Status: SHIPPED | OUTPATIENT
Start: 2023-10-06 | End: 2023-10-11

## 2023-10-06 NOTE — PROGRESS NOTES
[email or web address]  URL: [email or web address]  Date: 10/06/2023  Prepared by: Kenny Sampson    Exercises  - Seated Claw Fist AROM  - 5 x daily - 7 x weekly - 1 sets - 15 reps - 3 sec hold  - Seated Full Fist AROM  - 5 x daily - 7 x weekly - 1 sets - 15 reps - 3 sec hold  - Thumb AROM Opposition  - 5 x daily - 7 x weekly - 1 sets - 15 reps - 3 sec hold        ORTHOSIS ISSUED:   : HFO (hand finger orthosis), C/F (custom fabricated) - without joints, may include soft interface, straps, includes fitting and adjustment. Hand based safe position IF/MF prism material    TREATMENT PROVIDED:   Patient instructed in purpose, care, and precaution of orthosis wearing, Patient instructed in wearing schedule, and Patient instructed in HEP    WEAR SCHEDULE:   remove for exercise, dressing changes, hygiene    CARE OF ORTHOSIS AND PRECAUTIONS REVIEWED:   The orthosis can be cleaned with soap and water, rubbing alcohol, or a mild cleanser  Keep away from any direct source of heat, it will melt and/or lose it's shape  Keep away from dogs and/or pets that will chew the orthosis  Should the orthosis result in increased pain, numbness/tingling, increased swelling, or overall worsening of condition, patient is to contact the office immediately during business hours       CLINICAL DECISION MAKING/ASSESSMENT     Performance Deficits  Physical: Mobility, Strength, and Fine motor coordination  Cognitive: No deficiencies noted  Psychosocial: No deficiencies noted  Rehab potential: good    The patient presents today s/p Right index finger proximal phalanx open reduction internal fixation of articular fracture . The patient presents with guarding and pain, stiffness in the R IF . These deficits appear to be secondary to injury and surgery . Based on these subjective and objective findings, the patient is perceived as currently having a primary functional deficit of  77% dysfunction.      After a brief chart/PMH and OT profile

## 2023-10-09 ENCOUNTER — TREATMENT (OUTPATIENT)
Age: 43
End: 2023-10-09
Payer: COMMERCIAL

## 2023-10-09 DIAGNOSIS — M25.641 STIFFNESS OF FINGER JOINT OF RIGHT HAND: ICD-10-CM

## 2023-10-09 DIAGNOSIS — M79.641 PAIN OF RIGHT HAND: Primary | ICD-10-CM

## 2023-10-09 PROCEDURE — 97110 THERAPEUTIC EXERCISES: CPT | Performed by: OCCUPATIONAL THERAPIST

## 2023-10-09 NOTE — PROGRESS NOTES
[facility] OT INT Rodney Crew  [address] [facility] [address]  Dept: [phone]      Occupational Therapy Daily Note     Referring MD: Garrett Gordon MD    Diagnosis:     ICD-10-CM    1. Pain of right hand  M79.641       2. Stiffness of finger joint of right hand  M25.641            Surgery: Date 9/25/23 Right index finger proximal phalanx open reduction internal fixation of articular fracture    Therapy precautions: Fracture precautions    History of injury/onset : Tripped over invisible fencing in her yard    Total Direct Treatment Time: 45 min                       Total In Office Time: 45 min  Modifier needed: No  Episode visit count:  2     Preferred Name:  Devika Becerra        SUBJECTIVE     Current Symptoms/Chief complaints:   Chief Complaint   Patient presents with    Hand Pain     States she is having less pain this date. OBJECTIVE     Digital AROM:   IF 10/6/23      AROM    MCP 0/45      AROM      PIP 0/24      AROM      DIP 0/14      Active flexion to [facility]           Treatment:     Therapeutic exercise (05621) x 45 min:  Home Exercise Program development and Education: see below. Patient I with program following instruction and performance  Gentle LLSS to the IF to tolerance by therapist  BRIANNA and scar mobilization by therapist  Izabella instruction for edema management  AROM to target: cone    Access Code: [email or web address]  URL: [email or web address]  Date: 10/06/2023  Prepared by: Laurena Nyhan    Exercises  - Seated Claw Fist AROM  - 5 x daily - 7 x weekly - 1 sets - 15 reps - 3 sec hold  - Seated Full Fist AROM  - 5 x daily - 7 x weekly - 1 sets - 15 reps - 3 sec hold  - Thumb AROM Opposition  - 5 x daily - 7 x weekly - 1 sets - 15 reps - 3 sec hold        ASSESSMENT     Toelrated more motion this date, significantly stiff in the IF. PLAN     Continue with AROM and gentle over pressure of IF by therapist, edema and pain management.     GOALS     Short term goals:

## 2023-10-11 ENCOUNTER — TREATMENT (OUTPATIENT)
Age: 43
End: 2023-10-11
Payer: COMMERCIAL

## 2023-10-11 DIAGNOSIS — M79.641 PAIN OF RIGHT HAND: Primary | ICD-10-CM

## 2023-10-11 DIAGNOSIS — S62.640A CLOSED NONDISPLACED FRACTURE OF PROXIMAL PHALANX OF RIGHT INDEX FINGER, INITIAL ENCOUNTER: ICD-10-CM

## 2023-10-11 PROCEDURE — 97022 WHIRLPOOL THERAPY: CPT | Performed by: OCCUPATIONAL THERAPIST

## 2023-10-11 PROCEDURE — 97110 THERAPEUTIC EXERCISES: CPT | Performed by: OCCUPATIONAL THERAPIST

## 2023-10-13 ENCOUNTER — TREATMENT (OUTPATIENT)
Age: 43
End: 2023-10-13
Payer: COMMERCIAL

## 2023-10-13 DIAGNOSIS — M79.641 PAIN OF RIGHT HAND: Primary | ICD-10-CM

## 2023-10-13 DIAGNOSIS — M25.641 STIFFNESS OF FINGER JOINT OF RIGHT HAND: ICD-10-CM

## 2023-10-13 DIAGNOSIS — S62.640A CLOSED NONDISPLACED FRACTURE OF PROXIMAL PHALANX OF RIGHT INDEX FINGER, INITIAL ENCOUNTER: ICD-10-CM

## 2023-10-13 PROCEDURE — 97022 WHIRLPOOL THERAPY: CPT | Performed by: OCCUPATIONAL THERAPIST

## 2023-10-13 PROCEDURE — 97110 THERAPEUTIC EXERCISES: CPT | Performed by: OCCUPATIONAL THERAPIST

## 2023-10-13 NOTE — PROGRESS NOTES
[facility] OT INT Manny Hong  [address] [place] [id number]  Dept: [phone]      Occupational Therapy Daily Note      Referring MD: Alexander Mcconnell MD    Diagnosis:     ICD-10-CM    1. Pain of right hand  M79.641       2. Closed nondisplaced fracture of proximal phalanx of right index finger, initial encounter [S62.640A]  S62.640A       3. Stiffness of finger joint of right hand  M25.641              Surgery: Date 23 Right index finger proximal phalanx open reduction internal fixation of articular fracture    Therapy precautions: Fracture precautions    History of injury/onset : Tripped over invisible fencing in her yard    Total Direct Treatment Time: 55 min                       Total In Office Time: 60 min  Modifier needed: No  Episode visit count:  4     Preferred Name:  [address]:   Past Medical History:   Diagnosis Date    Anxiety     xanax    Hx of blood clots     after left knee ACL/meniscus tear DVT left calf; xarelto for 3 months then stopped - blood clot after surgery    IBS (irritable bowel syndrome)     Insomnia     ambien    Prediabetes     mounjaro taken twice a month   ,   Past Surgical History:   Procedure Laterality Date    BREAST SURGERY  2019    breast reduction    FINGER CLOSED REDUCTION Right 2023    ORIF right index finger performed by Alexander Mcconnell MD at [address] [place] [address]       x 1    GYN      laparoscopic    HEENT  2011    sinus surgery      Medications. : Reviewed in chart  Allergies: Allergies   Allergen Reactions    Sulfa Antibiotics Anaphylaxis     Other reaction(s): Anaphylactic shock-Allergy    Adhesive Tape Rash        SUBJECTIVE     Pt reports she tried to use her exercise tool several time while she was out of town. Some pain reported but mostly stiffness with AROM of the finger.      OBJECTIVE     Functional Outcome Measures: Quick Dash  45 score=   77.27 % functional deficit  Hand/Side

## 2023-10-16 ENCOUNTER — TREATMENT (OUTPATIENT)
Age: 43
End: 2023-10-16
Payer: COMMERCIAL

## 2023-10-16 DIAGNOSIS — M79.641 PAIN OF RIGHT HAND: Primary | ICD-10-CM

## 2023-10-16 DIAGNOSIS — M25.641 STIFFNESS OF FINGER JOINT OF RIGHT HAND: ICD-10-CM

## 2023-10-16 DIAGNOSIS — S62.640A CLOSED NONDISPLACED FRACTURE OF PROXIMAL PHALANX OF RIGHT INDEX FINGER, INITIAL ENCOUNTER: ICD-10-CM

## 2023-10-16 PROCEDURE — 97110 THERAPEUTIC EXERCISES: CPT | Performed by: OCCUPATIONAL THERAPIST

## 2023-10-16 NOTE — PROGRESS NOTES
[facility] OT [facility]  [address] [place] [id number]  Dept: [phone]      Occupational Therapy Daily Note      Referring MD: Luzma Perla MD    Diagnosis:     ICD-10-CM    1. Pain of right hand  M79.641       2. Closed nondisplaced fracture of proximal phalanx of right index finger, initial encounter [S62.640A]  S62.640A       3. Stiffness of finger joint of right hand  M25.641                Surgery: Date 23 Right index finger proximal phalanx open reduction internal fixation of articular fracture    Therapy precautions: Fracture precautions    History of injury/onset : Tripped over invisible fencing in her yard    Total Direct Treatment Time: 45 min                       Total In Office Time: 55 min  Modifier needed: No  Episode visit count:  5     Preferred Name:  [address]:   Past Medical History:   Diagnosis Date    Anxiety     xanax    Hx of blood clots     after left knee ACL/meniscus tear DVT left calf; xarelto for 3 months then stopped - blood clot after surgery    IBS (irritable bowel syndrome)     Insomnia     ambien    Prediabetes     mounjaro taken twice a month   ,   Past Surgical History:   Procedure Laterality Date    BREAST SURGERY  2019    breast reduction    FINGER CLOSED REDUCTION Right 2023    ORIF right index finger performed by Luzma Perla MD at [address] [place] [address]       x 1    GYN      laparoscopic    HEENT      sinus surgery      Medications. : Reviewed in chart  Allergies: Allergies   Allergen Reactions    Sulfa Antibiotics Anaphylaxis     Other reaction(s): Anaphylactic shock-Allergy    Adhesive Tape Rash        SUBJECTIVE     Pt reports she feels like she can touch her IF and thumb together more.      OBJECTIVE     Functional Outcome Measures: Quick Dash  45 score=   77.27 % functional deficit  Hand/Side Dominance: right handed  Observation/Posture: Holding UE in protected

## 2023-10-18 ENCOUNTER — TELEPHONE (OUTPATIENT)
Age: 43
End: 2023-10-18

## 2023-10-20 ENCOUNTER — TREATMENT (OUTPATIENT)
Age: 43
End: 2023-10-20
Payer: COMMERCIAL

## 2023-10-20 DIAGNOSIS — M25.641 STIFFNESS OF FINGER JOINT OF RIGHT HAND: ICD-10-CM

## 2023-10-20 DIAGNOSIS — M79.641 PAIN OF RIGHT HAND: Primary | ICD-10-CM

## 2023-10-20 PROCEDURE — 97110 THERAPEUTIC EXERCISES: CPT | Performed by: OCCUPATIONAL THERAPIST

## 2023-10-20 PROCEDURE — 97022 WHIRLPOOL THERAPY: CPT | Performed by: OCCUPATIONAL THERAPIST

## 2023-10-23 ENCOUNTER — OFFICE VISIT (OUTPATIENT)
Dept: ORTHOPEDIC SURGERY | Age: 43
End: 2023-10-23
Payer: COMMERCIAL

## 2023-10-23 ENCOUNTER — TREATMENT (OUTPATIENT)
Age: 43
End: 2023-10-23
Payer: COMMERCIAL

## 2023-10-23 DIAGNOSIS — R29.898 UPPER EXTREMITY WEAKNESS: ICD-10-CM

## 2023-10-23 DIAGNOSIS — M79.641 PAIN OF RIGHT HAND: Primary | ICD-10-CM

## 2023-10-23 DIAGNOSIS — S82.61XA CLOSED DISPLACED FRACTURE OF LATERAL MALLEOLUS OF RIGHT FIBULA, INITIAL ENCOUNTER: Primary | ICD-10-CM

## 2023-10-23 DIAGNOSIS — M25.641 STIFFNESS OF FINGER JOINT OF RIGHT HAND: ICD-10-CM

## 2023-10-23 PROCEDURE — 97110 THERAPEUTIC EXERCISES: CPT | Performed by: OCCUPATIONAL THERAPIST

## 2023-10-23 PROCEDURE — 99214 OFFICE O/P EST MOD 30 MIN: CPT | Performed by: ORTHOPAEDIC SURGERY

## 2023-10-23 PROCEDURE — 97140 MANUAL THERAPY 1/> REGIONS: CPT | Performed by: OCCUPATIONAL THERAPIST

## 2023-10-23 PROCEDURE — 97022 WHIRLPOOL THERAPY: CPT | Performed by: OCCUPATIONAL THERAPIST

## 2023-10-23 RX ORDER — HYDROCODONE BITARTRATE AND ACETAMINOPHEN 7.5; 325 MG/1; MG/1
1 TABLET ORAL EVERY 4 HOURS PRN
COMMUNITY
Start: 2023-10-22

## 2023-10-23 NOTE — PROGRESS NOTES
Name: Rufina Gudino  YOB: 1980  Gender: female  MRN: [id number]    Summary:   Right Corona a fibula fracture       CC: New Patient (Right ankle fx seen at [facility] ER )       HPI: Rufina Gudino is a 38 y.o. female who presents with New Patient (Right ankle fx seen at [facility] ER )  . This patient presents to the office today after a fall off of a curb last night where she felt a pop in the lateral aspect of her ankle. She was seen in the emergency room and diagnosed with an ankle fracture. She is here today in a walker boot for orthopedic follow-up. History was obtained by Patient and her     ROS/Meds/PSH/PMH/FH/SH: I personally reviewed the patients standard intake form. Below are the pertinents    Tobacco:  reports that she has never smoked. She has never used smokeless tobacco.  Diabetes: None      Physical Examination:    Exam of the right lower extremity shows tenderness palpation of the distal fibula. The peroneal tendons are intact. There is no medial ankle tenderness. There is no interosseous pain. There is midfoot is nontender. She has palpable pulses and intact sensation. Imaging:   I independently interpreted XR ordered by a different physician, taken from an outside facility of the right ankle which shows a mildly displaced Corona a fibula fracture with intact ankle mortise           Junior Shelly FRANCIS MD           Assessment:   Right Corona a fibula fracture    Treatment Plan:   4 This is a chronic illness/condition with exacerbation and progression  Treatment at this time: Closed treatment of fracture without manipulation which is an elective major procedure without identified risk factors  Studies ordered: Ankle XR needed @ Next Visit    Weight-bearing status: WBAT        Return to work/work restrictions: none  No medications given    She weight-bear as tolerated on her walker boot and return in 6 weeks for an x-ray.   We discussed the small risk of nonunion requiring

## 2023-10-23 NOTE — PROGRESS NOTES
[facility] OT INT Vivian Tirado  [address] [facility] [address]  Dept: [phone]      Occupational Therapy Daily Note      Referring MD: Rosita Costa MD    Diagnosis:     ICD-10-CM    1. Pain of right hand  M79.641       2. Stiffness of finger joint of right hand  M25.641       3. Upper extremity weakness  R29.898           Surgery: Date 9/25/23 Right index finger proximal phalanx open reduction internal fixation of articular fracture    Therapy precautions: Fracture precautions    History of injury/onset : Tripped over invisible fencing in her yard    Total Direct Treatment Time: 45 min                       Total In Office Time: 55 min  Modifier needed: No  Episode visit count:  7    Preferred Name:  García Koch     Pt presented in crutches and boot on her right foot, reports she fell on a curb at home and fractured her right ankle, reports she did not reinjure her right hand though. Reports IF stiffness/pain end range PROM but reports she is trying to perform stretches at home    Pain level in IF 3-4/10. OBJECTIVE     Functional Outcome Measures: Quick Dash  45 score=   77.27 % functional deficit  Hand/Side Dominance: right handed  Observation/Posture: Holding UE in protected position  Palpation: tender R IF  Swelling/Edema: moderate : R IF PIP 6.4 cm, L IF PIP 5.8 cm  Skin Integrity: incision well healed and no signs of infection       Digital AROM:     IF   10/6/23 IF  10/13/23 IF AROM (10/23/23 Post tx)    AROM    MCP 0/45 /70 0/75    AROM      PIP 0/24 /43 0/47    AROM      DIP 0/14 /32 0/50    Active flexion to [facility]           Treatment:     luidotherapy (52817):   Therapist guided AROM in fluidotherapy for heat/ROM prior to exercise and manual intervention to increase available ROM and joint mobility    Therapeutic exercise (21225) x 45 min:  Retrograde massage and scar massage dorsal IF along scar, issued small piece of dycem for circular massage to scar/entire IF

## 2023-10-24 NOTE — PROGRESS NOTES
[facility] OT [facility] Crow Berg  [address] [facility] [address]  Dept: [phone]      Occupational Therapy Daily Note      Referring MD: Luzma Perla MD    Diagnosis:     ICD-10-CM    1. Pain of right hand  M79.641       2. Stiffness of finger joint of right hand  M25.641       3. Upper extremity weakness  R29.898       4. Closed nondisplaced fracture of proximal phalanx of right index finger, initial encounter [B26.952A]  S62.640A           Surgery: Date 9/25/23 Right index finger proximal phalanx open reduction internal fixation of articular fracture    Therapy precautions: Fracture precautions    History of injury/onset : Tripped over invisible fencing in her yard    Total Direct Treatment Time: 45 min                       Total In Office Time: 55 min  Modifier needed: No  Episode visit count:  7    Preferred Name:  [address] [place] [address]     Pt reports she feels like is improves somewhat throughout the day and then gets stiff consistently. OBJECTIVE     Functional Outcome Measures: Quick Dash  45 score=   77.27 % functional deficit  Hand/Side Dominance: right handed  Observation/Posture: Holding UE in protected position  Palpation: tender R IF  Swelling/Edema: moderate : R IF PIP 6.4 cm, L IF PIP 5.8 cm  Skin Integrity: incision well healed and no signs of infection       Digital AROM:     IF   10/6/23 IF  10/13/23 IF AROM (10/23/23 Post tx)    AROM    MCP 0/45 /70 0/75    AROM      PIP 0/24 /43 0/47    AROM      DIP 0/14 /32 0/50    Active flexion to [facility]           Treatment:     luidotherapy (57836):   Therapist guided AROM in fluidotherapy for heat/ROM prior to exercise and manual intervention to increase available ROM and joint mobility    Therapeutic exercise (82384) x 25 min:  Retrograde massage and scar massage dorsal IF along scar, issued small piece of dycem for circular massage to scar/entire IF to improve tissue/scar tissue extensibility  IF AROM:   PIP blocking  DIP

## 2023-10-25 ENCOUNTER — TREATMENT (OUTPATIENT)
Age: 43
End: 2023-10-25

## 2023-10-25 DIAGNOSIS — M25.641 STIFFNESS OF FINGER JOINT OF RIGHT HAND: ICD-10-CM

## 2023-10-25 DIAGNOSIS — M79.641 PAIN OF RIGHT HAND: Primary | ICD-10-CM

## 2023-10-25 DIAGNOSIS — S62.640A CLOSED NONDISPLACED FRACTURE OF PROXIMAL PHALANX OF RIGHT INDEX FINGER, INITIAL ENCOUNTER: ICD-10-CM

## 2023-10-25 DIAGNOSIS — R29.898 UPPER EXTREMITY WEAKNESS: ICD-10-CM

## 2023-10-27 ENCOUNTER — TREATMENT (OUTPATIENT)
Age: 43
End: 2023-10-27
Payer: COMMERCIAL

## 2023-10-27 DIAGNOSIS — M25.641 STIFFNESS OF FINGER JOINT OF RIGHT HAND: ICD-10-CM

## 2023-10-27 DIAGNOSIS — R29.898 UPPER EXTREMITY WEAKNESS: ICD-10-CM

## 2023-10-27 DIAGNOSIS — M79.641 PAIN OF RIGHT HAND: Primary | ICD-10-CM

## 2023-10-27 DIAGNOSIS — S62.640A CLOSED NONDISPLACED FRACTURE OF PROXIMAL PHALANX OF RIGHT INDEX FINGER, INITIAL ENCOUNTER: ICD-10-CM

## 2023-10-27 PROCEDURE — 97022 WHIRLPOOL THERAPY: CPT | Performed by: OCCUPATIONAL THERAPIST

## 2023-10-27 PROCEDURE — 97110 THERAPEUTIC EXERCISES: CPT | Performed by: OCCUPATIONAL THERAPIST

## 2023-10-27 NOTE — PROGRESS NOTES
[facility] OT INT Crow Berg  [address] [place] [address]  Dept: [phone]      Occupational Therapy Daily Note      Referring MD: Luzma Perla MD    Diagnosis:     ICD-10-CM    1. Pain of right hand  M79.641       2. Stiffness of finger joint of right hand  M25.641       3. Upper extremity weakness  R29.898       4. Closed nondisplaced fracture of proximal phalanx of right index finger, initial encounter [T70.640A]  S62.640A           Surgery: Date 9/25/23 Right index finger proximal phalanx open reduction internal fixation of articular fracture    Therapy precautions: Fracture precautions    History of injury/onset : Tripped over invisible fencing in her yard    Total Direct Treatment Time: 45 min                       Total In Office Time: 55 min  Modifier needed: No  Episode visit count:  7    Preferred Name:  [address] [place] [address]     Pt reports she feels like is improves somewhat throughout the day and then gets stiff consistently. OBJECTIVE     Functional Outcome Measures: Quick Dash  45 score=   77.27 % functional deficit  Hand/Side Dominance: right handed  Observation/Posture: Holding UE in protected position  Palpation: tender R IF  Swelling/Edema: moderate : R IF PIP 6.4 cm, L IF PIP 5.8 cm  Skin Integrity: incision well healed and no signs of infection       Digital AROM:     IF   10/6/23 IF  10/13/23 IF AROM (10/23/23 Post tx)    AROM    MCP 0/45 /70 0/75    AROM      PIP 0/24 /43 0/47    AROM      DIP 0/14 /32 0/50    Active flexion to [facility]           Treatment:     luidotherapy (55760):   Therapist guided AROM in fluidotherapy for heat/ROM prior to exercise and manual intervention to increase available ROM and joint mobility    Therapeutic exercise (81444) x 35 min:  Retrograde massage and scar massage dorsal IF along scar, issued small piece of dycem for circular massage to scar/entire IF to improve tissue/scar tissue extensibility  IF AROM:   PIP blocking  DIP

## 2023-10-30 ENCOUNTER — TELEPHONE (OUTPATIENT)
Dept: ORTHOPEDIC SURGERY | Age: 43
End: 2023-10-30

## 2023-10-30 ENCOUNTER — TREATMENT (OUTPATIENT)
Age: 43
End: 2023-10-30
Payer: COMMERCIAL

## 2023-10-30 DIAGNOSIS — R29.898 UPPER EXTREMITY WEAKNESS: ICD-10-CM

## 2023-10-30 DIAGNOSIS — S62.640A CLOSED NONDISPLACED FRACTURE OF PROXIMAL PHALANX OF RIGHT INDEX FINGER, INITIAL ENCOUNTER: ICD-10-CM

## 2023-10-30 DIAGNOSIS — M25.641 STIFFNESS OF FINGER JOINT OF RIGHT HAND: ICD-10-CM

## 2023-10-30 DIAGNOSIS — M79.641 PAIN OF RIGHT HAND: Primary | ICD-10-CM

## 2023-10-30 DIAGNOSIS — S82.61XA CLOSED DISPLACED FRACTURE OF LATERAL MALLEOLUS OF RIGHT FIBULA, INITIAL ENCOUNTER: Primary | ICD-10-CM

## 2023-10-30 PROCEDURE — 97110 THERAPEUTIC EXERCISES: CPT | Performed by: OCCUPATIONAL THERAPIST

## 2023-10-30 PROCEDURE — 97022 WHIRLPOOL THERAPY: CPT | Performed by: OCCUPATIONAL THERAPIST

## 2023-10-30 RX ORDER — HYDROCODONE BITARTRATE AND ACETAMINOPHEN 7.5; 325 MG/1; MG/1
1 TABLET ORAL EVERY 6 HOURS PRN
Qty: 20 TABLET | Refills: 0 | Status: SHIPPED | OUTPATIENT
Start: 2023-10-30 | End: 2023-11-04

## 2023-10-30 NOTE — PROGRESS NOTES
[facility] OT INT Anjelica Rodriguez  [address] [address]  Dept: [phone]      Occupational Therapy Daily Note      Referring MD: Tara Miranda MD    Diagnosis:     ICD-10-CM    1. Pain of right hand  M79.641       2. Stiffness of finger joint of right hand  M25.641       3. Upper extremity weakness  R29.898       4. Closed nondisplaced fracture of proximal phalanx of right index finger, initial encounter [S62.640A]  S62.640A           Surgery: Date 9/25/23 Right index finger proximal phalanx open reduction internal fixation of articular fracture    Therapy precautions: Fracture precautions    History of injury/onset : Tripped over invisible fencing in her yard    Total Direct Treatment Time: 45 min                       Total In Office Time: 55 min  Modifier needed: No  Episode visit count:  7    Preferred Name:  [address] [place] [address]     Pt reports she feels more stiff today. She is not sure why. She used her static progressive PIP flexion orthosis for up to 15 minutes at a time over the weekend. OBJECTIVE     Functional Outcome Measures: Quick Dash  45 score=   77.27 % functional deficit  Hand/Side Dominance: right handed  Observation/Posture: Holding UE in protected position  Palpation: tender R IF  Swelling/Edema: moderate : R IF PIP 6.4 cm, L IF PIP 5.8 cm  Skin Integrity: incision well healed and no signs of infection       Digital AROM:     IF   10/6/23 IF  10/13/23 IF AROM (10/23/23 Post tx)    AROM    MCP 0/45 /70 0/75    AROM      PIP 0/24 /43 0/47    AROM      DIP 0/14 /32 0/50    Active flexion to [facility]           Treatment:     luidotherapy (85755):   Therapist guided AROM in fluidotherapy for heat/ROM prior to exercise and manual intervention to increase available ROM and joint mobility    Therapeutic exercise (12461) x 35 min:  Retrograde massage and scar massage dorsal IF along scar, issued small piece of dycem for circular massage to scar/entire IF to improve

## 2023-10-30 NOTE — TELEPHONE ENCOUNTER
She was seen last week for an ankle fracture and is wondering if she can get a refill of the Norco that was prescribed in the ER. The [facility] on file is correct.

## 2023-10-31 ENCOUNTER — TELEPHONE (OUTPATIENT)
Dept: ORTHOPEDIC SURGERY | Age: 43
End: 2023-10-31

## 2023-10-31 DIAGNOSIS — S82.61XA CLOSED DISPLACED FRACTURE OF LATERAL MALLEOLUS OF RIGHT FIBULA, INITIAL ENCOUNTER: Primary | ICD-10-CM

## 2023-10-31 DIAGNOSIS — M79.89 CALF SWELLING: ICD-10-CM

## 2023-10-31 DIAGNOSIS — M79.661 RIGHT CALF PAIN: ICD-10-CM

## 2023-10-31 NOTE — TELEPHONE ENCOUNTER
Zainab Ortiz spoke to patient, Set up Ultrasound for 11/01/2023 @ 10:15 [facility] , if positive for DVT she would get sent to ER for blood thinner and then she would need to follow up with PCP for blood thinner management .

## 2023-10-31 NOTE — TELEPHONE ENCOUNTER
Patient will send me an email with a picture of her foot and ankle and I will show Dr. Maria Fisher.

## 2023-11-01 ENCOUNTER — HOSPITAL ENCOUNTER (OUTPATIENT)
Dept: ULTRASOUND IMAGING | Age: 43
Discharge: HOME | End: 2023-11-04
Attending: ORTHOPAEDIC SURGERY

## 2023-11-01 DIAGNOSIS — M79.89 CALF SWELLING: ICD-10-CM

## 2023-11-01 DIAGNOSIS — S82.61XA CLOSED DISPLACED FRACTURE OF LATERAL MALLEOLUS OF RIGHT FIBULA, INITIAL ENCOUNTER: ICD-10-CM

## 2023-11-01 DIAGNOSIS — M79.661 RIGHT CALF PAIN: ICD-10-CM

## 2023-11-02 PROBLEM — S62.614A CLOSED DISPLACED FRACTURE OF PROXIMAL PHALANX OF RIGHT RING FINGER: Status: RESOLVED | Noted: 2023-09-19 | Resolved: 2023-11-02

## 2023-11-03 ENCOUNTER — TREATMENT (OUTPATIENT)
Age: 43
End: 2023-11-03
Payer: COMMERCIAL

## 2023-11-03 ENCOUNTER — OFFICE VISIT (OUTPATIENT)
Dept: ORTHOPEDIC SURGERY | Age: 43
End: 2023-11-03

## 2023-11-03 DIAGNOSIS — R29.898 UPPER EXTREMITY WEAKNESS: ICD-10-CM

## 2023-11-03 DIAGNOSIS — S62.640A CLOSED NONDISPLACED FRACTURE OF PROXIMAL PHALANX OF RIGHT INDEX FINGER, INITIAL ENCOUNTER: ICD-10-CM

## 2023-11-03 DIAGNOSIS — M79.641 PAIN OF RIGHT HAND: Primary | ICD-10-CM

## 2023-11-03 DIAGNOSIS — M25.641 STIFFNESS OF FINGER JOINT OF RIGHT HAND: ICD-10-CM

## 2023-11-03 DIAGNOSIS — M79.644 FINGER PAIN, RIGHT: Primary | ICD-10-CM

## 2023-11-03 PROCEDURE — 97110 THERAPEUTIC EXERCISES: CPT | Performed by: OCCUPATIONAL THERAPIST

## 2023-11-03 PROCEDURE — 97140 MANUAL THERAPY 1/> REGIONS: CPT | Performed by: OCCUPATIONAL THERAPIST

## 2023-11-03 NOTE — PROGRESS NOTES
less of PIP extension involved digit. Minimal edema in involved digit. Improve Quick DASH functional assessment score from less than 20% deficit.       [facility]     OT Protocols

## 2023-11-03 NOTE — PROGRESS NOTES
Orthopaedic Hand Surgery Note    Name: Emre Dwyer  Age: 38 y.o. YOB: 1980  Gender: female  MRN: [id number]    Post Operative Visit: ORIF right index finger - Right    HPI: Patient is status post ORIF right index finger - Right on 9/25/2023. Patient reports no pain at rest, pain mainly with therapy. Physical Examination:  Well-healed surgical wounds. Sensation is intact in all fingers. Motor exam reveals no deficits. She has 75 degrees of flexion at the MCP joint 45 degrees of flexion at the PIP joint with a 10 degree flexion contracture of the PIP joint. Imaging:     right index finger XR: AP, Lateral, Oblique views     Clinical Indication  1. Finger pain, right    2. Closed nondisplaced fracture of proximal phalanx of right index finger, initial encounter           Report: AP, Lateral, Oblique XR demonstrates comminuted fracture of the right index finger status post plate and screw fixation without hardware complication    Impression: as above     Balta Martin MD         Assessment:   1. Finger pain, right    2. Closed nondisplaced fracture of proximal phalanx of right index finger, initial encounter         Status post ORIF right index finger - Right on 9/25/2023    Plan:  We discussed the post operative course and progression.   Aggressive active and passive motion as tolerated, I will reassess the patient in 2 months, if at that point she still has significant stiffness then we may discuss hardware removal, tenolysis and arthroereisis surgery    Balta Martin MD  11/03/23  12:20 PM

## 2023-11-06 ENCOUNTER — TREATMENT (OUTPATIENT)
Age: 43
End: 2023-11-06
Payer: COMMERCIAL

## 2023-11-06 DIAGNOSIS — M79.641 PAIN OF RIGHT HAND: Primary | ICD-10-CM

## 2023-11-06 DIAGNOSIS — M25.641 STIFFNESS OF FINGER JOINT OF RIGHT HAND: ICD-10-CM

## 2023-11-06 DIAGNOSIS — R29.898 UPPER EXTREMITY WEAKNESS: ICD-10-CM

## 2023-11-06 DIAGNOSIS — S62.640A CLOSED NONDISPLACED FRACTURE OF PROXIMAL PHALANX OF RIGHT INDEX FINGER, INITIAL ENCOUNTER: ICD-10-CM

## 2023-11-06 PROCEDURE — 97022 WHIRLPOOL THERAPY: CPT | Performed by: OCCUPATIONAL THERAPIST

## 2023-11-06 PROCEDURE — 97110 THERAPEUTIC EXERCISES: CPT | Performed by: OCCUPATIONAL THERAPIST

## 2023-11-06 PROCEDURE — 97140 MANUAL THERAPY 1/> REGIONS: CPT | Performed by: OCCUPATIONAL THERAPIST

## 2023-11-06 NOTE — PROGRESS NOTES
[facility] OT CALVIN Lozano  [address] [address]  Dept: [phone]      Occupational Therapy Daily Note      Referring MD: Cyndi Morris MD    Diagnosis:     ICD-10-CM    1. Pain of right hand  M79.641       2. Stiffness of finger joint of right hand  M25.641       3. Upper extremity weakness  R29.898       4. Closed nondisplaced fracture of proximal phalanx of right index finger, initial encounter [S68.640A]  S62.640A           Surgery: Date 9/25/23 Right index finger proximal phalanx open reduction internal fixation of articular fracture    Therapy precautions: Fracture precautions    History of injury/onset : Tripped over invisible fencing in her yard    Total Direct Treatment Time: 55 min                       Total In Office Time: 60 min  Modifier needed: No  Episode visit count:  11    Preferred Name:  [address] [place] [address]     Pt reports continued stiffness. She is trying to use the stretching device. OBJECTIVE     Functional Outcome Measures: Quick Dash  45 score=   77.27 % functional deficit  Hand/Side Dominance: right handed  Observation/Posture: Holding UE in protected position  Palpation: tender R IF  Swelling/Edema: moderate : R IF PIP 6.4 cm, L IF PIP 5.8 cm  Skin Integrity: incision well healed and no signs of infection       Digital AROM:     IF   10/6/23 IF  10/13/23 IF AROM (10/23/23 Post tx) IF  11/6/23   AROM    MCP 0/45 /70 0/75    AROM      PIP 0/24 /43 0/47    AROM      DIP 0/14 /32 0/50    Active flexion to [facility]           Treatment:     Vaughn Weinberg ([id number]):   Therapist guided AROM in fluidotherapy for heat/ROM prior to exercise and manual intervention to increase available ROM and joint mobility    Therapeutic exercise (16605) x 30 min:  Moist hot pack to right hand prior to manual tx   IF AROM:   PIP blocking  DIP blocking  Reverse blocking   Towel walks at tabletop with 2# DB  Dowel press yellow theraputty  Yellow resistive pin with small

## 2023-11-10 ENCOUNTER — TREATMENT (OUTPATIENT)
Age: 43
End: 2023-11-10
Payer: COMMERCIAL

## 2023-11-10 DIAGNOSIS — S62.640A CLOSED NONDISPLACED FRACTURE OF PROXIMAL PHALANX OF RIGHT INDEX FINGER, INITIAL ENCOUNTER: ICD-10-CM

## 2023-11-10 DIAGNOSIS — R29.898 UPPER EXTREMITY WEAKNESS: ICD-10-CM

## 2023-11-10 DIAGNOSIS — M79.641 PAIN OF RIGHT HAND: Primary | ICD-10-CM

## 2023-11-10 DIAGNOSIS — M25.641 STIFFNESS OF FINGER JOINT OF RIGHT HAND: ICD-10-CM

## 2023-11-10 PROCEDURE — 97110 THERAPEUTIC EXERCISES: CPT | Performed by: OCCUPATIONAL THERAPIST

## 2023-11-10 PROCEDURE — 97022 WHIRLPOOL THERAPY: CPT | Performed by: OCCUPATIONAL THERAPIST

## 2023-11-10 PROCEDURE — 97140 MANUAL THERAPY 1/> REGIONS: CPT | Performed by: OCCUPATIONAL THERAPIST

## 2023-11-13 ENCOUNTER — TREATMENT (OUTPATIENT)
Age: 43
End: 2023-11-13
Payer: COMMERCIAL

## 2023-11-13 DIAGNOSIS — M25.641 STIFFNESS OF FINGER JOINT OF RIGHT HAND: ICD-10-CM

## 2023-11-13 DIAGNOSIS — M79.641 PAIN OF RIGHT HAND: Primary | ICD-10-CM

## 2023-11-13 DIAGNOSIS — R29.898 UPPER EXTREMITY WEAKNESS: ICD-10-CM

## 2023-11-13 DIAGNOSIS — S62.640A CLOSED NONDISPLACED FRACTURE OF PROXIMAL PHALANX OF RIGHT INDEX FINGER, INITIAL ENCOUNTER: ICD-10-CM

## 2023-11-13 PROCEDURE — 97110 THERAPEUTIC EXERCISES: CPT | Performed by: OCCUPATIONAL THERAPIST

## 2023-11-13 PROCEDURE — 97140 MANUAL THERAPY 1/> REGIONS: CPT | Performed by: OCCUPATIONAL THERAPIST

## 2023-11-13 PROCEDURE — 97022 WHIRLPOOL THERAPY: CPT | Performed by: OCCUPATIONAL THERAPIST

## 2023-11-15 ENCOUNTER — TREATMENT (OUTPATIENT)
Age: 43
End: 2023-11-15
Payer: COMMERCIAL

## 2023-11-15 DIAGNOSIS — R29.898 UPPER EXTREMITY WEAKNESS: ICD-10-CM

## 2023-11-15 DIAGNOSIS — M25.641 STIFFNESS OF FINGER JOINT OF RIGHT HAND: ICD-10-CM

## 2023-11-15 DIAGNOSIS — M79.641 PAIN OF RIGHT HAND: Primary | ICD-10-CM

## 2023-11-15 DIAGNOSIS — S62.640A CLOSED NONDISPLACED FRACTURE OF PROXIMAL PHALANX OF RIGHT INDEX FINGER, INITIAL ENCOUNTER: ICD-10-CM

## 2023-11-15 PROCEDURE — 97140 MANUAL THERAPY 1/> REGIONS: CPT | Performed by: OCCUPATIONAL THERAPIST

## 2023-11-15 PROCEDURE — 97110 THERAPEUTIC EXERCISES: CPT | Performed by: OCCUPATIONAL THERAPIST

## 2023-11-15 PROCEDURE — 97022 WHIRLPOOL THERAPY: CPT | Performed by: OCCUPATIONAL THERAPIST

## 2023-11-15 NOTE — PROGRESS NOTES
[facility] OT INT Vivian Tirado  [address] [facility] [address]  Dept: [phone]      Occupational Therapy Daily Note      Referring MD: Rosita Costa MD    Diagnosis:     ICD-10-CM    1. Pain of right hand  M79.641       2. Stiffness of finger joint of right hand  M25.641       3. Upper extremity weakness  R29.898       4. Closed nondisplaced fracture of proximal phalanx of right index finger, initial encounter [S62.640A]  S62.640A           Surgery: Date 9/25/23 Right index finger proximal phalanx open reduction internal fixation of articular fracture    Therapy precautions: Fracture precautions    History of injury/onset : Tripped over invisible fencing in her yard. Total Direct Treatment Time: 55 min                       Total In Office Time: 60 min  Modifier needed: No  Episode visit count:  11    Preferred Name:  García Koch     Pt reports she is very sore today. OBJECTIVE     Functional Outcome Measures: Quick Dash  45 score=   77.27 % functional deficit  Hand/Side Dominance: right handed  Observation/Posture: Holding UE in protected position  Palpation: tender R IF  Swelling/Edema: moderate : R IF PIP 6.4 cm, L IF PIP 5.8 cm  Skin Integrity: incision well healed and no signs of infection       Digital AROM:     IF   10/6/23 IF  10/13/23 IF AROM (10/23/23 Post tx) IF  11/13/23   AROM    MCP 0/45 /70 0/75 75   AROM      PIP 0/24 /43 0/47 47   AROM      DIP 0/14 /32 0/50 41   Active flexion to [facility]           Treatment:     Maria De Jesus Thompson ([id number]):   Therapist guided AROM in fluidotherapy for heat/ROM prior to exercise and manual intervention to increase available ROM and joint mobility    Therapeutic exercise (53330) x 30 min:  IF AROM:   PIP blocking  DIP blocking  Reverse blocking   Towel walks at tabletop with 3# DB  Red theraputty: rolling, pinching   Dowel press red theraputty (light)   Blue resistive pin with small sponges (tip to tip pinch)     Manual

## 2023-11-16 NOTE — PROGRESS NOTES
[facility] OT INT Vivian Tiraod  [address] [facility] [address]  Dept: [phone]      Occupational Therapy Daily Note      Referring MD: Rosita Costa MD    Diagnosis:     ICD-10-CM    1. Pain of right hand  M79.641       2. Stiffness of finger joint of right hand  M25.641       3. Upper extremity weakness  R29.898       4. Closed nondisplaced fracture of proximal phalanx of right index finger, initial encounter [S62.640A]  S62.640A           Surgery: Date 9/25/23 Right index finger proximal phalanx open reduction internal fixation of articular fracture    Therapy precautions: Fracture precautions    History of injury/onset : Tripped over invisible fencing in her yard. Total Direct Treatment Time: 45 min                       Total In Office Time: 55 min  Modifier needed: No  Episode visit count:  11    Preferred Name:  García Koch     Pt reports she feels like her finger is starting to bend more. OBJECTIVE     Functional Outcome Measures: Quick Dash  45 score=   77.27 % functional deficit  Hand/Side Dominance: right handed  Observation/Posture: Holding UE in protected position  Palpation: tender R IF  Swelling/Edema: moderate : R IF PIP 6.4 cm, L IF PIP 5.8 cm  Skin Integrity: incision well healed and no signs of infection       Digital AROM:     IF   10/6/23 IF  10/13/23 IF AROM (10/23/23 Post tx) IF  11/13/23   AROM    MCP 0/45 /70 0/75 75   AROM      PIP 0/24 /43 0/47 47   AROM      DIP 0/14 /32 0/50 41   Active flexion to [facility]           Treatment:     Maria De Jesus Thompson ([id number]):   Therapist guided AROM in fluidotherapy for heat/ROM prior to exercise and manual intervention to increase available ROM and joint mobility    Therapeutic exercise (39750) x 30 min:  IF AROM:   PIP blocking  DIP blocking  Reverse blocking   Towel walks at tabletop with 3# DB  Red theraputty: rolling, pinching   Dowel press green theraputty    Blue resistive pin with small sponges (tip to tip

## 2023-11-17 ENCOUNTER — TREATMENT (OUTPATIENT)
Age: 43
End: 2023-11-17
Payer: COMMERCIAL

## 2023-11-17 DIAGNOSIS — R29.898 UPPER EXTREMITY WEAKNESS: ICD-10-CM

## 2023-11-17 DIAGNOSIS — S62.640A CLOSED NONDISPLACED FRACTURE OF PROXIMAL PHALANX OF RIGHT INDEX FINGER, INITIAL ENCOUNTER: ICD-10-CM

## 2023-11-17 DIAGNOSIS — M79.641 PAIN OF RIGHT HAND: Primary | ICD-10-CM

## 2023-11-17 DIAGNOSIS — M25.641 STIFFNESS OF FINGER JOINT OF RIGHT HAND: ICD-10-CM

## 2023-11-17 PROCEDURE — 97022 WHIRLPOOL THERAPY: CPT | Performed by: OCCUPATIONAL THERAPIST

## 2023-11-17 PROCEDURE — 97110 THERAPEUTIC EXERCISES: CPT | Performed by: OCCUPATIONAL THERAPIST

## 2023-11-17 PROCEDURE — 97140 MANUAL THERAPY 1/> REGIONS: CPT | Performed by: OCCUPATIONAL THERAPIST

## 2023-11-20 ENCOUNTER — TREATMENT (OUTPATIENT)
Age: 43
End: 2023-11-20
Payer: COMMERCIAL

## 2023-11-20 DIAGNOSIS — M25.641 STIFFNESS OF FINGER JOINT OF RIGHT HAND: ICD-10-CM

## 2023-11-20 DIAGNOSIS — R29.898 UPPER EXTREMITY WEAKNESS: ICD-10-CM

## 2023-11-20 DIAGNOSIS — M79.641 PAIN OF RIGHT HAND: Primary | ICD-10-CM

## 2023-11-20 PROCEDURE — 97110 THERAPEUTIC EXERCISES: CPT | Performed by: OCCUPATIONAL THERAPIST

## 2023-11-20 PROCEDURE — 97140 MANUAL THERAPY 1/> REGIONS: CPT | Performed by: OCCUPATIONAL THERAPIST

## 2023-11-20 PROCEDURE — 97022 WHIRLPOOL THERAPY: CPT | Performed by: OCCUPATIONAL THERAPIST

## 2023-11-20 NOTE — PROGRESS NOTES
[facility] OT INT Patricia Mouse  [address] [facility] [address]  Dept: [phone]      Occupational Therapy Daily Note      Referring MD: Denilson Hagan MD    Diagnosis:     ICD-10-CM    1. Pain of right hand  M79.641       2. Stiffness of finger joint of right hand  M25.641       3. Upper extremity weakness  R29.898           Surgery: Date 9/25/23 Right index finger proximal phalanx open reduction internal fixation of articular fracture    Therapy precautions: Fracture precautions    History of injury/onset : Tripped over invisible fencing in her yard. Total Direct Treatment Time: 45 min                       Total In Office Time: 55 min  Modifier needed: No  Episode visit count:  11    Preferred Name:  Carmen Willson     Pt is 8 weeks post-op. Stiffness reported but using her hand more. OBJECTIVE     Functional Outcome Measures: Quick Dash  45 score=   77.27 % functional deficit  Hand/Side Dominance: right handed  Observation/Posture: Holding UE in protected position  Palpation: tender R IF  Swelling/Edema: moderate : R IF PIP 6.4 cm, L IF PIP 5.8 cm  Skin Integrity: incision well healed and no signs of infection       Digital AROM:     IF   10/6/23 IF  10/13/23 IF AROM (10/23/23 Post tx) IF  11/13/23 IF  11/20/23   AROM    MCP 0/45 /70 0/75 75 74   AROM      PIP 0/24 /43 0/47 47 50   AROM      DIP 0/14 /32 0/50 41 40   Active flexion to [facility]            Treatment:     Machelle Asa ([id number]):   Therapist guided AROM in fluidotherapy for heat/ROM prior to exercise and manual intervention to increase available ROM and joint mobility    Therapeutic exercise (54826) x 30 min:  IF AROM:   PIP blocking  DIP blocking  Reverse blocking   Towel walks at tabletop with 2# DB  Red theraputty: rolling, pinching   Dowel press green theraputty    Blue resistive pin with small sponges (tip to tip pinch)     Manual Treatment: x15 min  PROM (progressive) to PIP and DIP joints, proximal phalanx

## 2023-11-22 ENCOUNTER — TREATMENT (OUTPATIENT)
Age: 43
End: 2023-11-22
Payer: COMMERCIAL

## 2023-11-22 DIAGNOSIS — M25.641 STIFFNESS OF FINGER JOINT OF RIGHT HAND: ICD-10-CM

## 2023-11-22 DIAGNOSIS — M79.641 PAIN OF RIGHT HAND: Primary | ICD-10-CM

## 2023-11-22 DIAGNOSIS — R29.898 UPPER EXTREMITY WEAKNESS: ICD-10-CM

## 2023-11-22 DIAGNOSIS — S62.640A CLOSED NONDISPLACED FRACTURE OF PROXIMAL PHALANX OF RIGHT INDEX FINGER, INITIAL ENCOUNTER: ICD-10-CM

## 2023-11-22 PROCEDURE — 97110 THERAPEUTIC EXERCISES: CPT | Performed by: OCCUPATIONAL THERAPIST

## 2023-11-22 PROCEDURE — 97022 WHIRLPOOL THERAPY: CPT | Performed by: OCCUPATIONAL THERAPIST

## 2023-11-22 NOTE — PROGRESS NOTES
[facility] OT INT Manny Hong  [address] [place] [address]  Dept: [phone]      Occupational Therapy Daily Note      Referring MD: Alexander Mcconnell MD    Diagnosis:     ICD-10-CM    1. Pain of right hand  M79.641       2. Stiffness of finger joint of right hand  M25.641       3. Upper extremity weakness  R29.898       4. Closed nondisplaced fracture of proximal phalanx of right index finger, initial encounter [S61.640A]  S62.640A           Surgery: Date 9/25/23 Right index finger proximal phalanx open reduction internal fixation of articular fracture    Therapy precautions: Fracture precautions    History of injury/onset : Tripped over invisible fencing in her yard. Total Direct Treatment Time: 45 min                       Total In Office Time: 55 min  Modifier needed: No  Episode visit count:  19    Preferred Name:  Amrita Saunders     Pt reports she has been using her finger more over the weekend, she notices she can pinch more.      OBJECTIVE     Functional Outcome Measures: Quick Dash  45 score=   77.27 % functional deficit  Hand/Side Dominance: right handed  Observation/Posture: Holding UE in protected position  Palpation: tender R IF  Swelling/Edema: moderate : R IF PIP 6.4 cm, L IF PIP 5.8 cm  Skin Integrity: incision well healed and no signs of infection       Digital AROM:     IF   10/6/23 IF  10/13/23 IF AROM (10/23/23 Post tx) IF  11/13/23 IF  11/20/23 IF  11/27/23   AROM    MCP 0/45 /70 0/75 75 74 72   AROM      PIP 0/24 /43 0/47 47 50 51   AROM      DIP 0/14 /32 0/50 41 40 46   Active flexion to [facility]             Treatment:       Therapeutic exercise (58556) x 30 min:  Moist hot pack to right hand prior to manual tx with coban wrap into composite flexion   IF AROM:   PIP blocking  DIP blocking  Reverse blocking   Towel walks at tabletop with 3# DB  Green theraputty: rolling, pinching   Dowel press green theraputty    L-bar raking into green theraputty   Large knob

## 2023-11-22 NOTE — PROGRESS NOTES
[facility] OT INT Caesar Tavares  [address] [place] [address]  Dept: [phone]      Occupational Therapy Daily Note      Referring MD: Adalberto Cooper MD    Diagnosis:     ICD-10-CM    1. Pain of right hand  M79.641       2. Stiffness of finger joint of right hand  M25.641       3. Upper extremity weakness  R29.898       4. Closed nondisplaced fracture of proximal phalanx of right index finger, initial encounter [S62.640A]  S62.640A           Surgery: Date 9/25/23 Right index finger proximal phalanx open reduction internal fixation of articular fracture    Therapy precautions: Fracture precautions    History of injury/onset : Tripped over invisible fencing in her yard. Total Direct Treatment Time: 45 min                       Total In Office Time: 55 min  Modifier needed: No  Episode visit count:  18    Preferred Name:  Julio Justin     Pt is 8 weeks post-op. Stiffness reported but using her hand more. OBJECTIVE     Functional Outcome Measures: Quick Dash  45 score=   77.27 % functional deficit  Hand/Side Dominance: right handed  Observation/Posture: Holding UE in protected position  Palpation: tender R IF  Swelling/Edema: moderate : R IF PIP 6.4 cm, L IF PIP 5.8 cm  Skin Integrity: incision well healed and no signs of infection       Digital AROM:     IF   10/6/23 IF  10/13/23 IF AROM (10/23/23 Post tx) IF  11/13/23 IF  11/20/23   AROM    MCP 0/45 /70 0/75 75 74   AROM      PIP 0/24 /43 0/47 47 50   AROM      DIP 0/14 /32 0/50 41 40   Active flexion to [facility]          Strength  Strength (psi): RIGHT  11/22/23 LEFT  11/22/23      Position II 26 52       3pt pinch: 7 18          Treatment:     Fluidotherapy (45106):   Therapist guided AROM in fluidotherapy for heat/ROM prior to exercise and manual intervention to increase available ROM and joint mobility    Therapeutic exercise (26919) x 35 min:  IF AROM:   PIP blocking  DIP blocking  Reverse blocking   Towel walks at

## 2023-11-27 ENCOUNTER — TREATMENT (OUTPATIENT)
Age: 43
End: 2023-11-27
Payer: COMMERCIAL

## 2023-11-27 DIAGNOSIS — S62.640A CLOSED NONDISPLACED FRACTURE OF PROXIMAL PHALANX OF RIGHT INDEX FINGER, INITIAL ENCOUNTER: ICD-10-CM

## 2023-11-27 DIAGNOSIS — M79.641 PAIN OF RIGHT HAND: Primary | ICD-10-CM

## 2023-11-27 DIAGNOSIS — M25.641 STIFFNESS OF FINGER JOINT OF RIGHT HAND: ICD-10-CM

## 2023-11-27 DIAGNOSIS — R29.898 UPPER EXTREMITY WEAKNESS: ICD-10-CM

## 2023-11-27 PROCEDURE — 97110 THERAPEUTIC EXERCISES: CPT | Performed by: OCCUPATIONAL THERAPIST

## 2023-11-27 PROCEDURE — 97140 MANUAL THERAPY 1/> REGIONS: CPT | Performed by: OCCUPATIONAL THERAPIST

## 2023-11-30 ENCOUNTER — TREATMENT (OUTPATIENT)
Age: 43
End: 2023-11-30
Payer: COMMERCIAL

## 2023-11-30 DIAGNOSIS — M79.641 PAIN OF RIGHT HAND: Primary | ICD-10-CM

## 2023-11-30 DIAGNOSIS — R29.898 UPPER EXTREMITY WEAKNESS: ICD-10-CM

## 2023-11-30 DIAGNOSIS — M25.641 STIFFNESS OF FINGER JOINT OF RIGHT HAND: ICD-10-CM

## 2023-11-30 DIAGNOSIS — S62.640A CLOSED NONDISPLACED FRACTURE OF PROXIMAL PHALANX OF RIGHT INDEX FINGER, INITIAL ENCOUNTER: ICD-10-CM

## 2023-11-30 PROCEDURE — 97140 MANUAL THERAPY 1/> REGIONS: CPT | Performed by: OCCUPATIONAL THERAPIST

## 2023-11-30 PROCEDURE — 97022 WHIRLPOOL THERAPY: CPT | Performed by: OCCUPATIONAL THERAPIST

## 2023-11-30 PROCEDURE — 97110 THERAPEUTIC EXERCISES: CPT | Performed by: OCCUPATIONAL THERAPIST

## 2023-11-30 NOTE — PROGRESS NOTES
[facility] OT INT Shraddha Stout  [address] [place] [address]  Dept: [phone]      Occupational Therapy Daily Note      Referring MD: Isis Caputo MD    Diagnosis:     ICD-10-CM    1. Pain of right hand  M79.641       2. Stiffness of finger joint of right hand  M25.641       3. Upper extremity weakness  R29.898       4. Closed nondisplaced fracture of proximal phalanx of right index finger, initial encounter [S62.640A]  S62.640A           Surgery: Date 9/25/23 Right index finger proximal phalanx open reduction internal fixation of articular fracture    Therapy precautions: Fracture precautions    History of injury/onset : Tripped over invisible fencing in her yard. Total Direct Treatment Time: 45 min                       Total In Office Time: 55 min  Modifier needed: No  Episode visit count:  19    Preferred Name:  Ekta Acosta     Pt reports she has been using her finger more over the weekend, she notices she can pinch more. OBJECTIVE     Functional Outcome Measures: Quick Dash  45 score=   77.27 % functional deficit  Hand/Side Dominance: right handed  Observation/Posture: Holding UE in protected position  Palpation: tender R IF  Swelling/Edema: moderate : R IF PIP 6.4 cm, L IF PIP 5.8 cm  Skin Integrity: incision well healed and no signs of infection       Digital AROM:     IF   10/6/23 IF  10/13/23 IF AROM (10/23/23 Post tx) IF  11/13/23 IF  11/20/23 IF  11/27/23   AROM    MCP 0/45 /70 0/75 75 74 72   AROM      PIP 0/24 /43 0/47 47 50 51   AROM      DIP 0/14 /32 0/50 41 40 46   Active flexion to [facility]             Treatment:     Lloyd Richard ([id number]):   Therapist guided AROM in fluidotherapy for heat/ROM prior to exercise and manual intervention to increase available ROM and joint mobility    Therapeutic exercise (61707) x 30 min:  Moist hot pack to right hand prior to manual tx with coban wrap into composite flexion   IF AROM:   PIP blocking  DIP blocking  Reverse

## 2023-12-04 ENCOUNTER — OFFICE VISIT (OUTPATIENT)
Dept: ORTHOPEDIC SURGERY | Age: 43
End: 2023-12-04
Payer: COMMERCIAL

## 2023-12-04 ENCOUNTER — TREATMENT (OUTPATIENT)
Age: 43
End: 2023-12-04
Payer: COMMERCIAL

## 2023-12-04 DIAGNOSIS — R29.898 UPPER EXTREMITY WEAKNESS: ICD-10-CM

## 2023-12-04 DIAGNOSIS — S62.640A CLOSED NONDISPLACED FRACTURE OF PROXIMAL PHALANX OF RIGHT INDEX FINGER, INITIAL ENCOUNTER: ICD-10-CM

## 2023-12-04 DIAGNOSIS — M25.641 STIFFNESS OF FINGER JOINT OF RIGHT HAND: ICD-10-CM

## 2023-12-04 DIAGNOSIS — S82.61XA CLOSED DISPLACED FRACTURE OF LATERAL MALLEOLUS OF RIGHT FIBULA, INITIAL ENCOUNTER: ICD-10-CM

## 2023-12-04 DIAGNOSIS — S62.640A CLOSED NONDISPLACED FRACTURE OF PROXIMAL PHALANX OF RIGHT INDEX FINGER, INITIAL ENCOUNTER: Primary | ICD-10-CM

## 2023-12-04 DIAGNOSIS — M79.641 PAIN OF RIGHT HAND: Primary | ICD-10-CM

## 2023-12-04 PROCEDURE — 97110 THERAPEUTIC EXERCISES: CPT | Performed by: OCCUPATIONAL THERAPIST

## 2023-12-04 PROCEDURE — 99214 OFFICE O/P EST MOD 30 MIN: CPT | Performed by: ORTHOPAEDIC SURGERY

## 2023-12-04 RX ORDER — HYDROCODONE BITARTRATE AND ACETAMINOPHEN 5; 325 MG/1; MG/1
1 TABLET ORAL EVERY 6 HOURS PRN
Qty: 20 TABLET | Refills: 0 | Status: SHIPPED | OUTPATIENT
Start: 2023-12-04 | End: 2023-12-09

## 2023-12-04 NOTE — PROGRESS NOTES
will get into a lace up ankle brace and start a home exercise program provided and return as needed.

## 2023-12-04 NOTE — PROGRESS NOTES
[facility] OT CALVIN Lozano  [address] [place] [address]  Dept: [phone]      Occupational Therapy Daily Note      Referring MD: Cyndi Morris MD    Diagnosis:     ICD-10-CM    1. Pain of right hand  M79.641       2. Stiffness of finger joint of right hand  M25.641       3. Upper extremity weakness  R29.898       4. Closed nondisplaced fracture of proximal phalanx of right index finger, initial encounter [S69.640A]  S62.640A           Surgery: Date 9/25/23 Right index finger proximal phalanx open reduction internal fixation of articular fracture    Therapy precautions: Fracture precautions    History of injury/onset : Tripped over invisible fencing in her yard. Total Direct Treatment Time: 45 min                       Total In Office Time: 55 min  Modifier needed: No  Episode visit count:  19    Preferred Name:  Jan Sánchez     Pt reports she has been using her finger more over the weekend, she notices she can pinch more.      OBJECTIVE     Functional Outcome Measures: Quick Dash  45 score=   77.27 % functional deficit  Hand/Side Dominance: right handed  Observation/Posture: Holding UE in protected position  Palpation: tender R IF  Swelling/Edema: moderate : R IF PIP 6.4 cm, L IF PIP 5.8 cm  Skin Integrity: incision well healed and no signs of infection       Digital AROM:     IF   10/6/23 IF  10/13/23 IF AROM (10/23/23 Post tx) IF  11/13/23 IF  11/20/23 IF  11/27/23 IF  12/4/23   AROM    MCP 0/45 /70 0/75 75 74 72 80   AROM      PIP 0/24 /43 0/47 47 50 51 55   AROM      DIP 0/14 /32 0/50 41 40 46 45   Active flexion to [facility]            12/4/23: PROM PIP IF: 65 degrees     Treatment:       Therapeutic exercise (90031) x 45 min:  Moist hot pack to right hand prior to manual tx with coban wrap into composite flexion   IF AROM:   PIP blocking  DIP blocking  Reverse blocking   PROM MP-IP Index finger   Towel walks at tabletop with 3# DB with RMO   Red theraputty: rolling,

## 2023-12-07 ENCOUNTER — TREATMENT (OUTPATIENT)
Age: 43
End: 2023-12-07
Payer: COMMERCIAL

## 2023-12-07 DIAGNOSIS — M25.641 STIFFNESS OF FINGER JOINT OF RIGHT HAND: ICD-10-CM

## 2023-12-07 DIAGNOSIS — M79.641 PAIN OF RIGHT HAND: Primary | ICD-10-CM

## 2023-12-07 DIAGNOSIS — R29.898 UPPER EXTREMITY WEAKNESS: ICD-10-CM

## 2023-12-07 DIAGNOSIS — S62.640A CLOSED NONDISPLACED FRACTURE OF PROXIMAL PHALANX OF RIGHT INDEX FINGER, INITIAL ENCOUNTER: ICD-10-CM

## 2023-12-07 PROCEDURE — 97110 THERAPEUTIC EXERCISES: CPT | Performed by: OCCUPATIONAL THERAPIST

## 2023-12-07 PROCEDURE — 97022 WHIRLPOOL THERAPY: CPT | Performed by: OCCUPATIONAL THERAPIST

## 2023-12-07 NOTE — PROGRESS NOTES
[facility] OT Count includes the [facility] Caesar Tavares  [address] [place] [address]  Dept: [phone]      Occupational Therapy Daily Note      Referring MD: Adalberto Cooper MD    Diagnosis:     ICD-10-CM    1. Pain of right hand  M79.641       2. Stiffness of finger joint of right hand  M25.641       3. Upper extremity weakness  R29.898       4. Closed nondisplaced fracture of proximal phalanx of right index finger, initial encounter [S62.640A]  S62.640A           Surgery: Date 9/25/23 Right index finger proximal phalanx open reduction internal fixation of articular fracture    Therapy precautions: Fracture precautions    History of injury/onset : Tripped over invisible fencing in her yard. Total Direct Treatment Time: 45 min                       Total In Office Time: 55 min  Modifier needed: No  Episode visit count:  22    Preferred Name:  Julio Justin     Pt reports she has been using her finger more over the weekend, she notices she can pinch more. OBJECTIVE     Functional Outcome Measures: Quick Dash  45 score=   77.27 % functional deficit  Hand/Side Dominance: right handed  Observation/Posture: Holding UE in protected position  Palpation: tender R IF  Swelling/Edema: moderate : R IF PIP 6.4 cm, L IF PIP 5.8 cm  Skin Integrity: incision well healed and no signs of infection       Digital AROM:     IF   10/6/23 IF  10/13/23 IF AROM (10/23/23 Post tx) IF  11/13/23 IF  11/20/23 IF  11/27/23 IF  12/4/23   AROM    MCP 0/45 /70 0/75 75 74 72 80   AROM      PIP 0/24 /43 0/47 47 50 51 55   AROM      DIP 0/14 /32 0/50 41 40 46 45   Active flexion to [facility]            12/4/23: PROM PIP IF: 65 degrees     Treatment:     Quan Larson ([id number]):   Therapist guided AROM in fluidotherapy for heat/ROM prior to exercise and manual intervention to increase available ROM and joint mobility    Therapeutic exercise (05045) x 35 min:  IF AROM:   PIP blocking  DIP blocking  Reverse blocking   PROM MP-IP Index

## 2023-12-14 ENCOUNTER — TREATMENT (OUTPATIENT)
Age: 43
End: 2023-12-14

## 2023-12-14 DIAGNOSIS — R29.898 UPPER EXTREMITY WEAKNESS: ICD-10-CM

## 2023-12-14 DIAGNOSIS — M25.641 STIFFNESS OF FINGER JOINT OF RIGHT HAND: ICD-10-CM

## 2023-12-14 DIAGNOSIS — M79.641 PAIN OF RIGHT HAND: Primary | ICD-10-CM

## 2023-12-14 NOTE — PROGRESS NOTES
[facility] [address] [address]  Dept: [phone]      Occupational Therapy Daily Note      Referring MD: Lisa Viveros MD    Diagnosis:     ICD-10-CM    1. Pain of right hand  M79.641       2. Stiffness of finger joint of right hand  M25.641       3. Upper extremity weakness  R29.898             Surgery: Date 9/25/23 Right index finger proximal phalanx open reduction internal fixation of articular fracture    Therapy precautions: Fracture precautions    History of injury/onset : Tripped over invisible fencing in her yard. Total Direct Treatment Time: 50 min                       Total In Office Time: 55 min  Modifier needed: No  Episode visit count:  22    Preferred Name:  Eleonora Vaughan     Pt reports she continues to have trouble with active motion. Is adapting to less motion over all. Is open to a procedure to free up the joint. OBJECTIVE     Functional Outcome Measures: Quick Dash  45 score=   77.27 % functional deficit  Hand/Side Dominance: right handed  Observation/Posture: Holding UE in protected position  Palpation: tender R IF  Swelling/Edema: moderate : R IF PIP 6.4 cm, L IF PIP 5.8 cm  Skin Integrity: incision well healed and no signs of infection       Digital AROM:     IF   10/6/23 IF  10/13/23 IF AROM (10/23/23 Post tx) IF  11/13/23 IF  11/20/23 IF  11/27/23 IF  12/4/23   AROM    MCP 0/45 /70 0/75 75 74 72 80   AROM      PIP 0/24 /43 0/47 47 50 51 55   AROM      DIP 0/14 /32 0/50 41 40 46 45   Active flexion to [facility]            12/4/23: PROM PIP IF: 65 degrees     Treatment:     Manuel Javier ([id number]):   Therapist guided AROM in fluidotherapy for heat/ROM prior to exercise and manual intervention to increase available ROM and joint mobility    Therapeutic exercise (03981) x 25 min:  IF AROM:   PIP blocking  DIP blocking  Reverse blocking   PROM MP-IP Index finger     Manual Treatment: x15 min    Scar massage volar IF finger :

## 2023-12-15 ENCOUNTER — OFFICE VISIT (OUTPATIENT)
Dept: ORTHOPEDIC SURGERY | Age: 43
End: 2023-12-15
Payer: COMMERCIAL

## 2023-12-15 DIAGNOSIS — S62.640A CLOSED NONDISPLACED FRACTURE OF PROXIMAL PHALANX OF RIGHT INDEX FINGER, INITIAL ENCOUNTER: Primary | ICD-10-CM

## 2023-12-15 PROCEDURE — 99214 OFFICE O/P EST MOD 30 MIN: CPT | Performed by: ORTHOPAEDIC SURGERY

## 2023-12-15 NOTE — PROGRESS NOTES
desired results and possible need for additional surgery. Patient understands and wishes to proceed with surgery.      On Exam:   The patient is alert and oriented; ;   Lung auscultation is clear bilaterally   Heart has RRR without murmurs      Francoise Gonzales MD  12/15/23  12:03 PM

## 2023-12-15 NOTE — H&P (VIEW-ONLY)
Orthopaedic Hand Surgery Note    Name: Dorine Olszewski  Age: 37 y.o. YOB: 1980  Gender: female  MRN: [id number]    Post Operative Visit: ORIF right index finger - Right    HPI: Patient is status post ORIF right index finger - Right on 9/25/2023. Patient reports no pain except with passive motion of the PIP joint. Physical Examination:  Well-healed surgical wounds. Sensation is intact in all fingers. Motor exam reveals no deficits. She has no tenderness palpation of the fracture site, she has pain with passive stretch of the PIP joint, motion of the PIP joint is 10 to 40 degrees, DIP joint actively 0 to 70 degrees, she has good gliding of the FDP, limited gliding of the FDS, there is some thickening of the fascia on the volar aspect of the finger, active and passive motion of the MCP is 0 to 80 degrees. Imaging:     None new    Assessment:   1. Closed displaced fracture of lateral malleolus of right fibula, initial encounter         Status post ORIF right index finger - Right on 9/25/2023    Plan:  We discussed the post operative course and progression.   Left index finger hardware removal, tenolysis and joint release, we discussed all treatment options in detail, I explained that continued therapy will likely improve her motion however, she has PIP motion of 10 to 40 degrees which is severely limited, we discussed surgery and the patient would like to be aggressive in order to regain as much motion as possible, we will perform this under sedation with local anesthesia, I will infiltrate the surgical area with lidocaine with epinephrine so that we can release the tourniquet at the end of the case and have her participate actively in motion in order to have a good measure of the extent of the release    Patient understands risks and benefits of LEFT INDEX FINGER HARDWARE REMOVAL, TENOLYSIS AND JOINT RELEASE including but not limited to nerve injury, vessel injury, infection, failure to achieve

## 2023-12-19 PROBLEM — S62.610A DISPLACED FRACTURE OF PROXIMAL PHALANX OF RIGHT INDEX FINGER, INITIAL ENCOUNTER FOR CLOSED FRACTURE: Status: ACTIVE | Noted: 2023-12-18

## 2023-12-19 PROBLEM — M24.541 CONTRACTURE OF JOINT OF FINGER OF RIGHT HAND: Status: ACTIVE | Noted: 2023-12-18

## 2023-12-20 NOTE — PERIOP NOTE
Patient verified name and . Order for consent Not found in EHR patient verifies procedure. Type 1B surgery, Phone assessment complete. Orders not received. Labs per surgeon: none  Labs per anesthesia protocol: none    Patient answered medical/surgical history questions at their best of ability. All prior to admission medications documented in EPIC. Patient instructed to take the following medications the day of surgery according to anesthesia guidelines with a small sip of water: Alprazolam (Xanax) if needed On the day before surgery please take 2 Tylenol in the morning and then again before bed. You may use either regular or extra strength. Hold all vitamins 7 days prior to surgery and NSAIDS 5 days prior to surgery. Prescription meds to hold:none  Patient instructed on the following:    > Arrive at [facility], time of arrival to be called the day before by 1700  > NPO after midnight, unless otherwise indicated, including gum, mints, and ice chips  > Responsible adult must drive patient to the hospital, stay during surgery, and patient will need supervision 24 hours after anesthesia  > Use non moisturizing soap in shower the night before surgery and on the morning of surgery  > All piercings must be removed prior to arrival.    > Leave all valuables (money and jewelry) at home but bring insurance card and ID on DOS.   > You may be required to pay a deductible or co-pay on the day of your procedure. You can pre-pay by calling [phone] if your surgery is at the [facility] or [phone] if your surgery is at the [facility]. > Do not wear make-up, nail polish, lotions, cologne, perfumes, powders, or oil on skin. Artificial nails are not permitted.

## 2023-12-22 NOTE — PERIOP NOTE
Preop department called to notify patient of arrival time for scheduled procedure. Instructions given to   - Arrive at [address] [facility]. - Remain NPO after midnight, unless otherwise indicated, including gum, mints, and ice chips. - Have a responsible adult to drive patient to the hospital, stay during surgery, and patient will need supervision 24 hours after anesthesia. - Use antibacterial soap in shower the night before surgery and on the morning of surgery.        Was patient contacted: pt  Voicemail left:   Numbers contacted: [phone]   Arrival time: 4001

## 2023-12-25 ENCOUNTER — ANESTHESIA EVENT (OUTPATIENT)
Dept: SURGERY | Age: 43
End: 2023-12-25
Payer: COMMERCIAL

## 2023-12-25 DIAGNOSIS — S82.61XA CLOSED DISPLACED FRACTURE OF LATERAL MALLEOLUS OF RIGHT FIBULA, INITIAL ENCOUNTER: ICD-10-CM

## 2023-12-25 DIAGNOSIS — S62.640A CLOSED NONDISPLACED FRACTURE OF PROXIMAL PHALANX OF RIGHT INDEX FINGER, INITIAL ENCOUNTER: Primary | ICD-10-CM

## 2023-12-25 RX ORDER — OXYCODONE HYDROCHLORIDE 5 MG/1
5 TABLET ORAL EVERY 6 HOURS PRN
Qty: 20 TABLET | Refills: 0 | Status: SHIPPED | OUTPATIENT
Start: 2023-12-25 | End: 2023-12-26

## 2023-12-25 RX ORDER — ONDANSETRON 4 MG/1
4 TABLET, FILM COATED ORAL EVERY 8 HOURS PRN
Qty: 20 TABLET | Refills: 0 | Status: SHIPPED | OUTPATIENT
Start: 2023-12-25

## 2023-12-26 ENCOUNTER — HOSPITAL ENCOUNTER (OUTPATIENT)
Age: 43
Setting detail: OUTPATIENT SURGERY
Discharge: HOME | End: 2023-12-26
Attending: ORTHOPAEDIC SURGERY | Admitting: ORTHOPAEDIC SURGERY
Payer: COMMERCIAL

## 2023-12-26 ENCOUNTER — ANESTHESIA (OUTPATIENT)
Dept: SURGERY | Age: 43
End: 2023-12-26
Payer: COMMERCIAL

## 2023-12-26 ENCOUNTER — TELEPHONE (OUTPATIENT)
Dept: ORTHOPEDIC SURGERY | Age: 43
End: 2023-12-26

## 2023-12-26 ENCOUNTER — APPOINTMENT (OUTPATIENT)
Dept: GENERAL RADIOLOGY | Age: 43
End: 2023-12-26
Attending: ORTHOPAEDIC SURGERY
Payer: COMMERCIAL

## 2023-12-26 VITALS
WEIGHT: 155 LBS | DIASTOLIC BLOOD PRESSURE: 73 MMHG | HEART RATE: 68 BPM | OXYGEN SATURATION: 100 % | BODY MASS INDEX: 24.91 KG/M2 | RESPIRATION RATE: 20 BRPM | HEIGHT: 66 IN | SYSTOLIC BLOOD PRESSURE: 121 MMHG | TEMPERATURE: 98.6 F

## 2023-12-26 DIAGNOSIS — S62.640A CLOSED NONDISPLACED FRACTURE OF PROXIMAL PHALANX OF RIGHT INDEX FINGER, INITIAL ENCOUNTER: Primary | ICD-10-CM

## 2023-12-26 PROCEDURE — 26525 RELEASE FINGER CONTRACTURE: CPT | Performed by: ORTHOPAEDIC SURGERY

## 2023-12-26 PROCEDURE — 3600000002 HC SURGERY LEVEL 2 BASE: Performed by: ORTHOPAEDIC SURGERY

## 2023-12-26 PROCEDURE — 6360000002 HC RX W HCPCS: Performed by: NURSE PRACTITIONER

## 2023-12-26 PROCEDURE — 6370000000 HC RX 637 (ALT 250 FOR IP): Performed by: ANESTHESIOLOGY

## 2023-12-26 PROCEDURE — 2580000003 HC RX 258: Performed by: ANESTHESIOLOGY

## 2023-12-26 PROCEDURE — 6360000002 HC RX W HCPCS: Performed by: ORTHOPAEDIC SURGERY

## 2023-12-26 PROCEDURE — 6360000002 HC RX W HCPCS: Performed by: ANESTHESIOLOGY

## 2023-12-26 PROCEDURE — 3600000012 HC SURGERY LEVEL 2 ADDTL 15MIN: Performed by: ORTHOPAEDIC SURGERY

## 2023-12-26 PROCEDURE — 7100000010 HC PHASE II RECOVERY - FIRST 15 MIN: Performed by: ORTHOPAEDIC SURGERY

## 2023-12-26 PROCEDURE — 3700000001 HC ADD 15 MINUTES (ANESTHESIA): Performed by: ORTHOPAEDIC SURGERY

## 2023-12-26 PROCEDURE — 2709999900 HC NON-CHARGEABLE SUPPLY: Performed by: ORTHOPAEDIC SURGERY

## 2023-12-26 PROCEDURE — 3700000000 HC ANESTHESIA ATTENDED CARE: Performed by: ORTHOPAEDIC SURGERY

## 2023-12-26 PROCEDURE — 20680 REMOVAL OF IMPLANT DEEP: CPT | Performed by: ORTHOPAEDIC SURGERY

## 2023-12-26 PROCEDURE — 6360000002 HC RX W HCPCS: Performed by: NURSE ANESTHETIST, CERTIFIED REGISTERED

## 2023-12-26 PROCEDURE — 2500000003 HC RX 250 WO HCPCS: Performed by: ORTHOPAEDIC SURGERY

## 2023-12-26 PROCEDURE — 26449 RELEASE FOREARM/HAND TENDON: CPT | Performed by: ORTHOPAEDIC SURGERY

## 2023-12-26 PROCEDURE — 7100000000 HC PACU RECOVERY - FIRST 15 MIN: Performed by: ORTHOPAEDIC SURGERY

## 2023-12-26 PROCEDURE — 2500000003 HC RX 250 WO HCPCS: Performed by: NURSE ANESTHETIST, CERTIFIED REGISTERED

## 2023-12-26 PROCEDURE — 7100000001 HC PACU RECOVERY - ADDTL 15 MIN: Performed by: ORTHOPAEDIC SURGERY

## 2023-12-26 RX ORDER — HYDROMORPHONE HYDROCHLORIDE 2 MG/ML
0.25 INJECTION, SOLUTION INTRAMUSCULAR; INTRAVENOUS; SUBCUTANEOUS EVERY 5 MIN PRN
Status: DISCONTINUED | OUTPATIENT
Start: 2023-12-26 | End: 2023-12-26 | Stop reason: HOSPADM

## 2023-12-26 RX ORDER — PROPOFOL 10 MG/ML
INJECTION, EMULSION INTRAVENOUS PRN
Status: DISCONTINUED | OUTPATIENT
Start: 2023-12-26 | End: 2023-12-26 | Stop reason: SDUPTHER

## 2023-12-26 RX ORDER — SODIUM CHLORIDE, SODIUM LACTATE, POTASSIUM CHLORIDE, CALCIUM CHLORIDE 600; 310; 30; 20 MG/100ML; MG/100ML; MG/100ML; MG/100ML
INJECTION, SOLUTION INTRAVENOUS CONTINUOUS
Status: DISCONTINUED | OUTPATIENT
Start: 2023-12-26 | End: 2023-12-26 | Stop reason: HOSPADM

## 2023-12-26 RX ORDER — SODIUM CHLORIDE 0.9 % (FLUSH) 0.9 %
5-40 SYRINGE (ML) INJECTION EVERY 12 HOURS SCHEDULED
Status: DISCONTINUED | OUTPATIENT
Start: 2023-12-26 | End: 2023-12-26 | Stop reason: HOSPADM

## 2023-12-26 RX ORDER — HYDROMORPHONE HYDROCHLORIDE 2 MG/ML
0.5 INJECTION, SOLUTION INTRAMUSCULAR; INTRAVENOUS; SUBCUTANEOUS EVERY 5 MIN PRN
Status: DISCONTINUED | OUTPATIENT
Start: 2023-12-26 | End: 2023-12-26 | Stop reason: HOSPADM

## 2023-12-26 RX ORDER — SODIUM CHLORIDE 0.9 % (FLUSH) 0.9 %
5-40 SYRINGE (ML) INJECTION PRN
Status: DISCONTINUED | OUTPATIENT
Start: 2023-12-26 | End: 2023-12-26 | Stop reason: HOSPADM

## 2023-12-26 RX ORDER — LABETALOL HYDROCHLORIDE 5 MG/ML
10 INJECTION, SOLUTION INTRAVENOUS
Status: DISCONTINUED | OUTPATIENT
Start: 2023-12-26 | End: 2023-12-26 | Stop reason: HOSPADM

## 2023-12-26 RX ORDER — HYDRALAZINE HYDROCHLORIDE 20 MG/ML
10 INJECTION INTRAMUSCULAR; INTRAVENOUS
Status: DISCONTINUED | OUTPATIENT
Start: 2023-12-26 | End: 2023-12-26 | Stop reason: HOSPADM

## 2023-12-26 RX ORDER — SODIUM CHLORIDE 9 MG/ML
INJECTION, SOLUTION INTRAVENOUS PRN
Status: DISCONTINUED | OUTPATIENT
Start: 2023-12-26 | End: 2023-12-26 | Stop reason: HOSPADM

## 2023-12-26 RX ORDER — LIDOCAINE HYDROCHLORIDE 20 MG/ML
INJECTION, SOLUTION EPIDURAL; INFILTRATION; INTRACAUDAL; PERINEURAL PRN
Status: DISCONTINUED | OUTPATIENT
Start: 2023-12-26 | End: 2023-12-26 | Stop reason: SDUPTHER

## 2023-12-26 RX ORDER — OXYCODONE HYDROCHLORIDE 5 MG/1
5 TABLET ORAL
Status: COMPLETED | OUTPATIENT
Start: 2023-12-26 | End: 2023-12-26

## 2023-12-26 RX ORDER — MIDAZOLAM HYDROCHLORIDE 2 MG/2ML
2 INJECTION, SOLUTION INTRAMUSCULAR; INTRAVENOUS
Status: DISCONTINUED | OUTPATIENT
Start: 2023-12-26 | End: 2023-12-26 | Stop reason: HOSPADM

## 2023-12-26 RX ORDER — FENTANYL CITRATE 50 UG/ML
100 INJECTION, SOLUTION INTRAMUSCULAR; INTRAVENOUS
Status: DISCONTINUED | OUTPATIENT
Start: 2023-12-26 | End: 2023-12-26 | Stop reason: HOSPADM

## 2023-12-26 RX ORDER — PROCHLORPERAZINE EDISYLATE 5 MG/ML
5 INJECTION INTRAMUSCULAR; INTRAVENOUS
Status: DISCONTINUED | OUTPATIENT
Start: 2023-12-26 | End: 2023-12-26 | Stop reason: HOSPADM

## 2023-12-26 RX ORDER — LIDOCAINE HYDROCHLORIDE 10 MG/ML
1 INJECTION, SOLUTION INFILTRATION; PERINEURAL
Status: DISCONTINUED | OUTPATIENT
Start: 2023-12-26 | End: 2023-12-26 | Stop reason: HOSPADM

## 2023-12-26 RX ORDER — ACETAMINOPHEN 500 MG
1000 TABLET ORAL ONCE
Status: COMPLETED | OUTPATIENT
Start: 2023-12-26 | End: 2023-12-26

## 2023-12-26 RX ORDER — BUPIVACAINE HYDROCHLORIDE 2.5 MG/ML
INJECTION, SOLUTION EPIDURAL; INFILTRATION; INTRACAUDAL PRN
Status: DISCONTINUED | OUTPATIENT
Start: 2023-12-26 | End: 2023-12-26 | Stop reason: ALTCHOICE

## 2023-12-26 RX ORDER — HYDROCODONE BITARTRATE AND ACETAMINOPHEN 5; 325 MG/1; MG/1
1 TABLET ORAL EVERY 6 HOURS PRN
Qty: 20 TABLET | Refills: 0 | Status: SHIPPED | OUTPATIENT
Start: 2023-12-26 | End: 2023-12-31

## 2023-12-26 RX ORDER — LIDOCAINE HYDROCHLORIDE AND EPINEPHRINE 10; 10 MG/ML; UG/ML
INJECTION, SOLUTION INFILTRATION; PERINEURAL PRN
Status: DISCONTINUED | OUTPATIENT
Start: 2023-12-26 | End: 2023-12-26 | Stop reason: ALTCHOICE

## 2023-12-26 RX ORDER — MIDAZOLAM HYDROCHLORIDE 1 MG/ML
INJECTION INTRAMUSCULAR; INTRAVENOUS PRN
Status: DISCONTINUED | OUTPATIENT
Start: 2023-12-26 | End: 2023-12-26 | Stop reason: SDUPTHER

## 2023-12-26 RX ORDER — ONDANSETRON 2 MG/ML
4 INJECTION INTRAMUSCULAR; INTRAVENOUS
Status: COMPLETED | OUTPATIENT
Start: 2023-12-26 | End: 2023-12-26

## 2023-12-26 RX ADMIN — PROPOFOL 100 MG: 10 INJECTION, EMULSION INTRAVENOUS at 07:01

## 2023-12-26 RX ADMIN — ONDANSETRON 4 MG: 2 INJECTION INTRAMUSCULAR; INTRAVENOUS at 08:22

## 2023-12-26 RX ADMIN — ACETAMINOPHEN 1000 MG: 500 TABLET, FILM COATED ORAL at 06:10

## 2023-12-26 RX ADMIN — LIDOCAINE HYDROCHLORIDE 80 MG: 20 INJECTION, SOLUTION EPIDURAL; INFILTRATION; INTRACAUDAL; PERINEURAL at 07:01

## 2023-12-26 RX ADMIN — Medication 2000 MG: at 07:05

## 2023-12-26 RX ADMIN — OXYCODONE 5 MG: 5 TABLET ORAL at 08:23

## 2023-12-26 RX ADMIN — SODIUM CHLORIDE, POTASSIUM CHLORIDE, SODIUM LACTATE AND CALCIUM CHLORIDE: 600; 310; 30; 20 INJECTION, SOLUTION INTRAVENOUS at 06:09

## 2023-12-26 RX ADMIN — PROPOFOL 140 MCG/KG/MIN: 10 INJECTION, EMULSION INTRAVENOUS at 07:03

## 2023-12-26 RX ADMIN — MIDAZOLAM 2 MG: 1 INJECTION INTRAMUSCULAR; INTRAVENOUS at 07:00

## 2023-12-26 NOTE — ANESTHESIA PRE PROCEDURE
Department of Anesthesiology  Preprocedure Note       Name:  Ellen Garcia   Age:  37 y.o.  :  1980                                          MRN:  [id number]         Date:  2023      Surgeon: Owen Adames):  Urszula Murdock MD    Procedure: Procedure(s):  HAND HARDWARE REMOVAL right index finger  PALMAR FASCIECTOMY right index finger    Medications prior to admission:   Prior to Admission medications    Medication Sig Start Date End Date Taking? Authorizing Provider   ondansetron (ZOFRAN) 4 MG tablet Take 1 tablet by mouth every 8 hours as needed for Nausea or Vomiting 23   Nadeen Cameron APRN - CNP   oxyCODONE (ROXICODONE) 5 MG immediate release tablet Take 1 tablet by mouth every 6 hours as needed for Pain for up to 5 days. Max Daily Amount: 20 mg 23  Nadeen Cameron APRN - CNP   ondansetron (ZOFRAN) 4 MG tablet Take 1 tablet by mouth every 8 hours as needed for Nausea or Vomiting 23   Nadeen Cameron APRN - CNP   ALPRAZolam Sandrea Pong) 0.25 MG tablet Take 1 tablet by mouth daily as needed.  22   Provider, MD Merari   EPINEPHrine (EPIPEN) 0.3 MG/0.3ML SOAJ injection Inject 0.3 mLs into the muscle as needed 3/1/18   Provider, MD Merari   zolpidem (AMBIEN CR) 12.5 MG extended release tablet Take 1 tablet by mouth. 19   Automatic Reconciliation, Ar       Current medications:    Current Facility-Administered Medications   Medication Dose Route Frequency Provider Last Rate Last Admin    lidocaine 1 % injection 1 mL  1 mL IntraDERmal Once PRN Blase Pizza, DO        fentaNYL (SUBLIMAZE) injection 100 mcg  100 mcg IntraVENous Once PRN Blase Pizza, DO        lactated ringers IV soln infusion   IntraVENous Continuous Blase Pizza,  mL/hr at 23 0609 New Bag at 23 0609    sodium chloride flush 0.9 % injection 5-40 mL  5-40 mL IntraVENous 2 times per day Shashank Robles,         sodium chloride flush 0.9 % injection

## 2023-12-26 NOTE — TELEPHONE ENCOUNTER
She needs confirmation that patient is to have Norco and Oxycodone. Please give her a call. This was left on voicemail.

## 2023-12-26 NOTE — INTERVAL H&P NOTE
H&P Update:  Aime Sainz was seen and examined. History and physical has been reviewed. The patient has been examined.  There have been no significant clinical changes since the completion of the originally dated History and Physical.    Tamiko Patten MD  Orthopaedic Surgery  12/26/23  6:47 AM

## 2023-12-26 NOTE — TELEPHONE ENCOUNTER
Rx Oxy was sent to pharm but patient is requesting to change it to Rx Norco. Any questions call [facility]/ [facility] [phone]

## 2023-12-26 NOTE — ANESTHESIA POSTPROCEDURE EVALUATION
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 Carmen Phase I: Carmen Score: 10    Carmen Phase II: Carmen Score: 10    Anesthesia Post Evaluation    Patient location during evaluation: bedside  Patient participation: complete - patient participated  Level of consciousness: awake and alert  Airway patency: patent  Nausea & Vomiting: no vomiting  Cardiovascular status: hemodynamically stable  Respiratory status: acceptable  Hydration status: euvolemic  Pain management: adequate    No notable events documented.

## 2023-12-27 ENCOUNTER — TREATMENT (OUTPATIENT)
Age: 43
End: 2023-12-27
Payer: COMMERCIAL

## 2023-12-27 DIAGNOSIS — R29.898 UPPER EXTREMITY WEAKNESS: ICD-10-CM

## 2023-12-27 DIAGNOSIS — M79.641 PAIN OF RIGHT HAND: Primary | ICD-10-CM

## 2023-12-27 DIAGNOSIS — S82.61XA CLOSED DISPLACED FRACTURE OF LATERAL MALLEOLUS OF RIGHT FIBULA, INITIAL ENCOUNTER: ICD-10-CM

## 2023-12-27 DIAGNOSIS — S62.640A CLOSED NONDISPLACED FRACTURE OF PROXIMAL PHALANX OF RIGHT INDEX FINGER, INITIAL ENCOUNTER: Primary | ICD-10-CM

## 2023-12-27 DIAGNOSIS — M25.641 STIFFNESS OF FINGER JOINT OF RIGHT HAND: ICD-10-CM

## 2023-12-27 PROCEDURE — 97140 MANUAL THERAPY 1/> REGIONS: CPT | Performed by: OCCUPATIONAL THERAPIST

## 2023-12-27 PROCEDURE — 97110 THERAPEUTIC EXERCISES: CPT | Performed by: OCCUPATIONAL THERAPIST

## 2023-12-27 RX ORDER — METHYLPREDNISOLONE 4 MG/1
TABLET ORAL
Qty: 1 KIT | Refills: 0 | Status: SHIPPED | OUTPATIENT
Start: 2023-12-27

## 2023-12-27 NOTE — PROGRESS NOTES
use.  IPROM MP-IP Index finger     Manual Treatment: x10 min  Myofascial mobilization manual by therapist hand and finger        Access Code: [email or web address]  URL: [email or web address]  Date: 10/06/2023  Prepared by: Dale Solis    Exercises  - Seated Claw Fist AROM  - 5 x daily - 7 x weekly - 1 sets - 15 reps - 3 sec hold  - Seated Full Fist AROM  - 5 x daily - 7 x weekly - 1 sets - 15 reps - 3 sec hold  - Thumb AROM Opposition  - 5 x daily - 7 x weekly - 1 sets - 15 reps - 3 sec hold    ASSESSMENT     Patient day one post surgical manipulation and hardware removal to the IF. Surgeon obtained excellent range in the OR. Less motion present this date. Obtained about 50% passive. Discussed plan at length, that pain will be present. Pain described this date is \"different\", more tingling than deep ache pre op. Springy end feel with PIP flexion. Coban needed for edema management and MD prescribe a steroid dose pack to aid in this as well. PLAN      Resume OT for aggressive ROM and rehabilitation for strength and function. Pain, Edema and scar management. Previous splinting assessment and may need to re mold for PIP flexion static stretching. GOALS          Short Term Goals: 1/23/24 (4 weeks)   Pt will have at least 70 degrees of active PIP flexion in the affected index finger to improve grasp and prehensile ability. Pt will lack 10 degrees or less of PIP extension in the involved digit. Pt will have at least 35 psi of  strength in affected hand. Pt will demonstrate minimal edema in the index finger in order to facilitate tendon excursion and ROM, functional hand use    Long term goals: 2/20/2024  (8 weeks)   Pt will be able to use the affected UE for all ADL's without difficulty. Pt will have adequate strength to be able to hold and open containers without difficulty. Pt will be able to make a full composite fist with the involved hand to enable to grasp and hold objects.   Pt

## 2023-12-27 NOTE — OP NOTE
central tendon, the joint was exposed, dorsal capsulectomy was performed, collateral ligament release was then performed as well, passive flexion was then performed and full flexion was achieved including 90 degrees at the MCP joint and 100 degrees at the PIP joint, the patient was awoken from sedation and active motion was confirmed with near identical results, with active motion she was still lacking the terminal 20 degrees of flexion that we had achieved passively. The tourniquet was deflated and hemostasis was ensured. The wounds were then thoroughly irrigated and closed in layered fashion with 5-0 interrupted Prolene sutures for the extensor tendon interval, multiple passive flexion maneuvers were performed in order to confirm integrity of this repair, interrupted 4-0 Prolene sutures for skin. A sterile dressing was applied followed by a  compressive dressing     The patient was awakened and taken to PACU in stable condition. All sponge and needle counts were correct at the end of the case. I was present and scrubbed for the entire procedure. DISPOSITION:    The patient will be discharged home. Weightbearing status: WBAT       CHEMICAL VTE (DVT) PROPHYLAXIS: None warranted for this case     FOLLOW-UP:  10-14 days for wound check and XRs if needed.      Arturo Gardner MD    12/27/23  7:34 AM

## 2023-12-28 NOTE — PROGRESS NOTES
[facility] OT INT Yoan Sampson  [address] [facility] [address]  Dept: [phone]      Occupational Therapy Daily Note      Referring MD: Marshall Buchanan MD    Diagnosis:     ICD-10-CM    1. Pain of right hand  M79.641       2. Stiffness of finger joint of right hand  M25.641       3. Upper extremity weakness  R29.898       4. Closed nondisplaced fracture of proximal phalanx of right index finger, initial encounter [S62.640A]  S62.640A           Surgery: Date 9/25/23 Right index finger proximal phalanx open reduction internal fixation of articular fracture  12/26/23:  Right index finger plate removal, right index finger extensor tenolysis including EDC and lateral band, right index finger capsulectomy and joint release at the PIP joint level     Therapy precautions: None    History of injury/onset : Tripped over invisible fencing in her yard. Total Direct Treatment Time: 30 min                       Total In Office Time: 45 min  Modifier needed: No  Episode visit count:  25    Preferred Name:  Lorraine Fu     Pt had surgical procedure yesterday to the IF  per above. Presents to resume therapy this date for ROM. OBJECTIVE     Functional Outcome Measures: Quick Dash  45 score=   77.27 % functional deficit at IE  Hand/Side Dominance: right handed    Swelling/Edema: moderate : R IF PIP 6.4 cm, L IF PIP 5.8 cm  at IE  12/27/23: moderate, unable to assess due to drainage  Skin Integrity:  dorsal IF incision, sutures in tact.   Active bleeding distal proximal phalanx , small opening       Digital AROM:     IF   10/6/23 IF  12/4/23 IF 12/27/23 IF  12/29/23   AROM    MCP 0/45 /80 0/30    AROM      PIP 0/24 /55 0/26 64   AROM      DIP 0/14 /45 0/15 50   Active flexion to [facility]         Digital PROM IF  12/29/23 MF RF SF   MCP 75      PIP 66      DIP       Total AROM       Flexion to [facility]           12/4/23: PROM PIP IF: 65 degrees     Treatment:       Therapeutic exercise (43013) x 30

## 2023-12-29 ENCOUNTER — TREATMENT (OUTPATIENT)
Age: 43
End: 2023-12-29

## 2023-12-29 DIAGNOSIS — M79.641 PAIN OF RIGHT HAND: Primary | ICD-10-CM

## 2023-12-29 DIAGNOSIS — R29.898 UPPER EXTREMITY WEAKNESS: ICD-10-CM

## 2023-12-29 DIAGNOSIS — S62.640A CLOSED NONDISPLACED FRACTURE OF PROXIMAL PHALANX OF RIGHT INDEX FINGER, INITIAL ENCOUNTER: ICD-10-CM

## 2023-12-29 DIAGNOSIS — M25.641 STIFFNESS OF FINGER JOINT OF RIGHT HAND: ICD-10-CM

## 2024-01-02 ENCOUNTER — TREATMENT (OUTPATIENT)
Age: 44
End: 2024-01-02
Payer: COMMERCIAL

## 2024-01-02 DIAGNOSIS — R29.898 UPPER EXTREMITY WEAKNESS: ICD-10-CM

## 2024-01-02 DIAGNOSIS — M25.641 STIFFNESS OF FINGER JOINT OF RIGHT HAND: ICD-10-CM

## 2024-01-02 DIAGNOSIS — S62.640A CLOSED NONDISPLACED FRACTURE OF PROXIMAL PHALANX OF RIGHT INDEX FINGER, INITIAL ENCOUNTER: ICD-10-CM

## 2024-01-02 DIAGNOSIS — M79.641 PAIN OF RIGHT HAND: Primary | ICD-10-CM

## 2024-01-02 PROCEDURE — 97110 THERAPEUTIC EXERCISES: CPT | Performed by: OCCUPATIONAL THERAPIST

## 2024-01-02 NOTE — PROGRESS NOTES
[facility] OT [facility] ORTHOPAEDICS  [address] [place] [address]  Dept: [phone]      Occupational Therapy Daily Note      Referring MD: Nato Day MD    Diagnosis:     ICD-10-CM    1. Pain of right hand  M79.641       2. Stiffness of finger joint of right hand  M25.641       3. Upper extremity weakness  R29.898       4. Closed nondisplaced fracture of proximal phalanx of right index finger, initial encounter [S62.640A]  S62.640A             Surgery: Date 9/25/23 Right index finger proximal phalanx open reduction internal fixation of articular fracture  12/26/23:  Right index finger plate removal, right index finger extensor tenolysis including EDC and lateral band, right index finger capsulectomy and joint release at the PIP joint level     Therapy precautions: None    History of injury/onset : Tripped over invisible fencing in her yard.     Total Direct Treatment Time: 40 min                       Total In Office Time: 45 min  Modifier needed: No  Episode visit count:  1 of 120     Preferred Name:  Mulugeta Clayton    SUBJECTIVE     Pt reports she was been using all her tools that we made prior to this current surgery. She comes in today with fairly good IF AROM.     OBJECTIVE     Functional Outcome Measures: Quick Dash  45 score=   77.27 % functional deficit at IE  Hand/Side Dominance: right handed    Swelling/Edema: moderate : R IF PIP 6.4 cm, L IF PIP 5.8 cm  at IE  12/27/23: moderate, unable to assess due to drainage  Skin Integrity:  dorsal IF incision, sutures in tact.  Active bleeding distal proximal phalanx , small opening       Digital AROM:     IF   10/6/23 IF  12/4/23 IF 12/27/23 IF  12/29/23   AROM    MCP 0/45 /80 0/30    AROM      PIP 0/24 /55 0/26 64   AROM      DIP 0/14 /45 0/15 50   Active flexion to DPC         Digital PROM IF  12/29/23 MF RF SF   MCP 75      PIP 66      DIP       Total AROM       Flexion to DPC           12/4/23: PROM PIP IF: 65 degrees     Treatment:

## 2024-01-03 ENCOUNTER — TREATMENT (OUTPATIENT)
Age: 44
End: 2024-01-03
Payer: COMMERCIAL

## 2024-01-03 DIAGNOSIS — M25.641 STIFFNESS OF FINGER JOINT OF RIGHT HAND: ICD-10-CM

## 2024-01-03 DIAGNOSIS — M79.641 PAIN OF RIGHT HAND: Primary | ICD-10-CM

## 2024-01-03 DIAGNOSIS — R29.898 UPPER EXTREMITY WEAKNESS: ICD-10-CM

## 2024-01-03 PROCEDURE — 97110 THERAPEUTIC EXERCISES: CPT | Performed by: OCCUPATIONAL THERAPIST

## 2024-01-03 PROCEDURE — 97140 MANUAL THERAPY 1/> REGIONS: CPT | Performed by: OCCUPATIONAL THERAPIST

## 2024-01-03 NOTE — PROGRESS NOTES
[facility] OT [facility] ORTHOPAEDICS  [address] [place] [address]  Dept: [phone]      Occupational Therapy Daily Note      Referring MD: Nato Day MD    Diagnosis:     ICD-10-CM    1. Pain of right hand  M79.641       2. Stiffness of finger joint of right hand  M25.641       3. Upper extremity weakness  R29.898               Surgery: Date 9/25/23 Right index finger proximal phalanx open reduction internal fixation of articular fracture  12/26/23:  Right index finger plate removal, right index finger extensor tenolysis including EDC and lateral band, right index finger capsulectomy and joint release at the PIP joint level     Therapy precautions: None    History of injury/onset : Tripped over invisible fencing in her yard.     Total Direct Treatment Time: 50 min                       Total In Office Time: 55 min  Modifier needed: No  Episode visit count:  25    Preferred Name:  Mulugeta Clayton    SUBJECTIVE     Pt reports compliance with ROM.  States that the pain is \"much different\" than previously described.  Can tolerate this type much better and is encouraged with gains.    OBJECTIVE     Functional Outcome Measures: Quick Dash  45 score=   77.27 % functional deficit at IE  Hand/Side Dominance: right handed    Swelling/Edema: moderate : R IF PIP 6.4 cm, L IF PIP 5.8 cm  at IE  12/27/23: moderate, unable to assess due to drainage  Skin Integrity:  dorsal IF incision, sutures in tact.  Active bleeding distal proximal phalanx , small opening       Digital AROM:     IF   10/6/23 IF  12/4/23 IF 12/27/23 IF  12/29/23   AROM    MCP 0/45 /80 0/30    AROM      PIP 0/24 /55 0/26 64   AROM      DIP 0/14 /45 0/15 50   Active flexion to DPC         Digital PROM IF  12/29/23 IF 1/3/23     MCP 75      PIP 66 0/92     DIP       Total AROM       Flexion to DPC           12/4/23: PROM PIP IF: 65 degrees     Treatment:       Therapeutic exercise (61464) x 40 min:  Moist hot pack to right hand prior to manual

## 2024-01-05 ENCOUNTER — TREATMENT (OUTPATIENT)
Age: 44
End: 2024-01-05
Payer: COMMERCIAL

## 2024-01-05 DIAGNOSIS — M79.641 PAIN OF RIGHT HAND: Primary | ICD-10-CM

## 2024-01-05 DIAGNOSIS — S62.640A CLOSED NONDISPLACED FRACTURE OF PROXIMAL PHALANX OF RIGHT INDEX FINGER, INITIAL ENCOUNTER: ICD-10-CM

## 2024-01-05 DIAGNOSIS — R29.898 UPPER EXTREMITY WEAKNESS: ICD-10-CM

## 2024-01-05 DIAGNOSIS — M25.641 STIFFNESS OF FINGER JOINT OF RIGHT HAND: ICD-10-CM

## 2024-01-05 PROCEDURE — 97110 THERAPEUTIC EXERCISES: CPT | Performed by: OCCUPATIONAL THERAPIST

## 2024-01-05 NOTE — PROGRESS NOTES
[facility] OT [facility] ORTHOPAEDICS  [address] [place] [id number]  Dept: [phone]      Occupational Therapy Daily Note      Referring MD: Nato Day MD    Diagnosis:     ICD-10-CM    1. Pain of right hand  M79.641       2. Stiffness of finger joint of right hand  M25.641       3. Upper extremity weakness  R29.898       4. Closed nondisplaced fracture of proximal phalanx of right index finger, initial encounter [S62.640A]  S62.640A             Surgery: Date 9/25/23 Right index finger proximal phalanx open reduction internal fixation of articular fracture  12/26/23:  Right index finger plate removal, right index finger extensor tenolysis including EDC and lateral band, right index finger capsulectomy and joint release at the PIP joint level     Therapy precautions: None    History of injury/onset : Tripped over invisible fencing in her yard.     Total Direct Treatment Time: 40 min                       Total In Office Time: 50 min  Modifier needed: No  Episode visit count:  4     Preferred Name:  Mulugeta Clayton    SUBJECTIVE     Pt continues to report that her pain is somewhat different than previously. She still has pain but she is able to tolerate more passive stretching on her finger.     OBJECTIVE     Functional Outcome Measures: Quick Dash  45 score=   77.27 % functional deficit at IE  Hand/Side Dominance: right handed    Swelling/Edema: moderate : R IF PIP 6.4 cm, L IF PIP 5.8 cm  at IE  12/27/23: moderate, unable to assess due to drainage  Skin Integrity:  dorsal IF incision, sutures in tact.  Active bleeding distal proximal phalanx , small opening       Digital AROM:     IF   10/6/23 IF  12/4/23 IF 12/27/23 IF  12/29/23   AROM    MCP 0/45 /80 0/30    AROM      PIP 0/24 /55 0/26 64   AROM      DIP 0/14 /45 0/15 50   Active flexion to DPC         Digital PROM IF  12/29/23 IF 1/3/23     MCP 75      PIP 66 0/92     DIP       Total AROM       Flexion to DPC           12/4/23: PROM PIP IF: 65

## 2024-01-08 ENCOUNTER — TREATMENT (OUTPATIENT)
Age: 44
End: 2024-01-08
Payer: COMMERCIAL

## 2024-01-08 DIAGNOSIS — R29.898 UPPER EXTREMITY WEAKNESS: ICD-10-CM

## 2024-01-08 DIAGNOSIS — M25.641 STIFFNESS OF FINGER JOINT OF RIGHT HAND: ICD-10-CM

## 2024-01-08 DIAGNOSIS — M79.641 PAIN OF RIGHT HAND: Primary | ICD-10-CM

## 2024-01-08 DIAGNOSIS — S62.640A CLOSED NONDISPLACED FRACTURE OF PROXIMAL PHALANX OF RIGHT INDEX FINGER, INITIAL ENCOUNTER: ICD-10-CM

## 2024-01-08 PROCEDURE — 97110 THERAPEUTIC EXERCISES: CPT | Performed by: OCCUPATIONAL THERAPIST

## 2024-01-08 PROCEDURE — 97140 MANUAL THERAPY 1/> REGIONS: CPT | Performed by: OCCUPATIONAL THERAPIST

## 2024-01-08 NOTE — PROGRESS NOTES
[facility] OT [facility] ORTHOPAEDICS  [address] [place] [address]  Dept: [phone]      Occupational Therapy Daily Note      Referring MD: Nato Day MD    Diagnosis:     ICD-10-CM    1. Pain of right hand  M79.641       2. Stiffness of finger joint of right hand  M25.641       3. Upper extremity weakness  R29.898       4. Closed nondisplaced fracture of proximal phalanx of right index finger, initial encounter [S62.640A]  S62.640A             Surgery: Date 9/25/23 Right index finger proximal phalanx open reduction internal fixation of articular fracture  12/26/23:  Right index finger plate removal, right index finger extensor tenolysis including EDC and lateral band, right index finger capsulectomy and joint release at the PIP joint level     Therapy precautions: None    History of injury/onset : Tripped over invisible fencing in her yard.     Total Direct Treatment Time: 45 min                       Total In Office Time: 55 min  Modifier needed: No  Episode visit count:  25    Preferred Name:  Mulugeta Clayton    SUBJECTIVE     Pt reports she worked with her stretching device over the weekend and progressed with her finger AROM.     OBJECTIVE     Functional Outcome Measures: Quick Dash  45 score=   77.27 % functional deficit at IE  Hand/Side Dominance: right handed    Swelling/Edema: moderate : R IF PIP 6.4 cm, L IF PIP 5.8 cm  at IE  12/27/23: moderate, unable to assess due to drainage  Skin Integrity:  dorsal IF incision, sutures in tact.  Active bleeding distal proximal phalanx , small opening       Digital AROM:     IF   10/6/23 IF  12/4/23 IF 12/27/23 IF  12/29/23   AROM    MCP 0/45 /80 0/30    AROM      PIP 0/24 /55 0/26 64   AROM      DIP 0/14 /45 0/15 50   Active flexion to DPC         Digital PROM IF  12/29/23 IF 1/3/23     MCP 75      PIP 66 0/92     DIP       Total AROM       Flexion to DPC           12/4/23: PROM PIP IF: 65 degrees     Treatment:       Therapeutic exercise (05360) x

## 2024-01-10 ENCOUNTER — TREATMENT (OUTPATIENT)
Age: 44
End: 2024-01-10
Payer: COMMERCIAL

## 2024-01-10 ENCOUNTER — OFFICE VISIT (OUTPATIENT)
Dept: ORTHOPEDIC SURGERY | Age: 44
End: 2024-01-10

## 2024-01-10 DIAGNOSIS — M79.641 PAIN OF RIGHT HAND: Primary | ICD-10-CM

## 2024-01-10 DIAGNOSIS — R29.898 UPPER EXTREMITY WEAKNESS: ICD-10-CM

## 2024-01-10 DIAGNOSIS — M25.641 STIFFNESS OF FINGER JOINT OF RIGHT HAND: ICD-10-CM

## 2024-01-10 DIAGNOSIS — S62.640A CLOSED NONDISPLACED FRACTURE OF PROXIMAL PHALANX OF RIGHT INDEX FINGER, INITIAL ENCOUNTER: Primary | ICD-10-CM

## 2024-01-10 PROCEDURE — 99024 POSTOP FOLLOW-UP VISIT: CPT | Performed by: ORTHOPAEDIC SURGERY

## 2024-01-10 PROCEDURE — 97140 MANUAL THERAPY 1/> REGIONS: CPT | Performed by: OCCUPATIONAL THERAPIST

## 2024-01-10 PROCEDURE — 97110 THERAPEUTIC EXERCISES: CPT | Performed by: OCCUPATIONAL THERAPIST

## 2024-01-10 NOTE — PROGRESS NOTES
[facility] ORTHOPAEDICS  [address] [place] [id number]  Dept: [phone]      Occupational Therapy Daily Note      Referring MD: Nato Day MD    Diagnosis:     ICD-10-CM    1. Pain of right hand  M79.641       2. Stiffness of finger joint of right hand  M25.641       3. Upper extremity weakness  R29.898               Surgery: Date 9/25/23 Right index finger proximal phalanx open reduction internal fixation of articular fracture  12/26/23:  Right index finger plate removal, right index finger extensor tenolysis including EDC and lateral band, right index finger capsulectomy and joint release at the PIP joint level     Therapy precautions: None    History of injury/onset : Tripped over invisible fencing in her yard.     Total Direct Treatment Time: 40 min                       Total In Office Time: 45 min  Modifier needed: No  Episode visit count:  25    Preferred Name:  Mulugeta Clayton    SUBJECTIVE     Pt reports she remains motivated and is compliant with stretching.  OBJECTIVE     Functional Outcome Measures: Quick Dash  45 score=   77.27 % functional deficit at IE  Hand/Side Dominance: right handed    Swelling/Edema: moderate : R IF PIP 6.4 cm, L IF PIP 5.8 cm  at IE  12/27/23: moderate, unable to assess due to drainage  Skin Integrity:  dorsal IF incision, sutures in tact.  Active bleeding distal proximal phalanx ,        Digital AROM:     IF   10/6/23 IF  12/4/23 IF 12/27/23 IF  12/29/23 IF 1/10/24   AROM    MCP 0/45 /80 0/30     AROM      PIP 0/24 /55 0/26 /64 START /61  END /85   AROM      DIP 0/14 /45 0/15 /50 /45   Active flexion to DPC          Digital PROM IF  12/29/23 IF 1/3/23 IF 1/10/24    MCP 75      PIP 66 0/92 /97    DIP       Total AROM       Flexion to DPC           12/4/23: PROM PIP IF: 65 degrees     Treatment:       Therapeutic exercise (75855) x 30 min:  Moist hot pack to right hand prior to manual tx with coban wrap into composite flexion   IF AROM:   PIP

## 2024-01-12 NOTE — PROGRESS NOTES
Orthopaedic Hand Surgery Note    Name: Mulugeta Monroy  Age: 43 y.o.  YOB: 1980  Gender: female  MRN: [id number]    Post Operative Visit: HAND HARDWARE REMOVAL right index finger - Right and PALMAR FASCIECTOMY right index finger - Right    HPI: Patient is status post HAND HARDWARE REMOVAL right index finger - Right and PALMAR FASCIECTOMY right index finger - Right on 12/26/2023. Patient reports pain is much improved from before surgery, motion continues to improve as well.    Physical Examination:  Well-healed surgical wounds. Sensation is intact in all fingers. Motor exam reveals no deficits.  She can make a full composite fist, 5 mm tip to palm distance.    Imaging:     right Hand XR: AP, Lateral, Oblique and Thumb CMC joint     Clinical Indication:  1. Closed nondisplaced fracture of proximal phalanx of right index finger, initial encounter           Report: AP, lateral, oblique and thumb CMC joint hand XRs demonstrates healed right index finger proximal phalanx fracture status post plate removal    Impression: as above     Nato Day MD         Assessment:   1. Closed nondisplaced fracture of proximal phalanx of right index finger, initial encounter         Status post HAND HARDWARE REMOVAL right index finger - Right and PALMAR FASCIECTOMY right index finger - Right on 12/26/2023    Plan:  We discussed the post operative course and progression.  Activity as tolerated, follow-up as needed continue aggressive therapy for improved motion    Nato Day MD  01/12/24  7:41 AM

## 2024-01-22 ENCOUNTER — TREATMENT (OUTPATIENT)
Age: 44
End: 2024-01-22
Payer: COMMERCIAL

## 2024-01-22 DIAGNOSIS — M25.641 STIFFNESS OF FINGER JOINT OF RIGHT HAND: ICD-10-CM

## 2024-01-22 DIAGNOSIS — M79.641 PAIN OF RIGHT HAND: Primary | ICD-10-CM

## 2024-01-22 DIAGNOSIS — R29.898 UPPER EXTREMITY WEAKNESS: ICD-10-CM

## 2024-01-22 DIAGNOSIS — S62.640A CLOSED NONDISPLACED FRACTURE OF PROXIMAL PHALANX OF RIGHT INDEX FINGER, INITIAL ENCOUNTER: ICD-10-CM

## 2024-01-22 PROCEDURE — 97110 THERAPEUTIC EXERCISES: CPT | Performed by: OCCUPATIONAL THERAPIST

## 2024-01-22 NOTE — PROGRESS NOTES
[facility] ORTHOPAEDICS  [address] [place] [id number]  Dept: [phone]      Occupational Therapy Daily Note      Referring MD: Nato Day MD    Diagnosis:     ICD-10-CM    1. Pain of right hand  M79.641       2. Stiffness of finger joint of right hand  M25.641       3. Upper extremity weakness  R29.898       4. Closed nondisplaced fracture of proximal phalanx of right index finger, initial encounter [S62.640A]  S62.640A               Surgery: Date 9/25/23 Right index finger proximal phalanx open reduction internal fixation of articular fracture  12/26/23:  Right index finger plate removal, right index finger extensor tenolysis including EDC and lateral band, right index finger capsulectomy and joint release at the PIP joint level     Therapy precautions: None    History of injury/onset : Tripped over invisible fencing in her yard.     Total Direct Treatment Time: 45 min                       Total In Office Time: 55 min  Modifier needed: No  Episode visit count: 6    Preferred Name:  Mulugeta Clayton    SUBJECTIVE     Pt reports she has been out of town for the past week for a work trip. She has been less consistent with her stretches.     OBJECTIVE     Functional Outcome Measures: Quick Dash  45 score=   77.27 % functional deficit at IE  Hand/Side Dominance: right handed    Swelling/Edema: moderate : R IF PIP 6.4 cm, L IF PIP 5.8 cm  at IE  12/27/23: moderate, unable to assess due to drainage  Skin Integrity:  dorsal IF incision, sutures in tact.  Active bleeding distal proximal phalanx ,        Digital AROM:     IF   10/6/23 IF  12/4/23 IF 12/27/23 IF  12/29/23 IF 1/10/24 IF  1/22/24   AROM    MCP 0/45 /80 0/30   77   AROM      PIP 0/24 /55 0/26 /64 START /61  END /85 76   AROM      DIP 0/14 /45 0/15 /50 /45 52   Active flexion to DPC           Digital PROM IF  12/29/23 IF 1/3/23 IF 1/10/24   MCP 75     PIP 66 0/92 /97   DIP      Total AROM      Flexion to DPC          12/4/23: PROM

## 2024-01-26 ENCOUNTER — TREATMENT (OUTPATIENT)
Age: 44
End: 2024-01-26
Payer: COMMERCIAL

## 2024-01-26 DIAGNOSIS — M25.641 STIFFNESS OF FINGER JOINT OF RIGHT HAND: ICD-10-CM

## 2024-01-26 DIAGNOSIS — R29.898 UPPER EXTREMITY WEAKNESS: ICD-10-CM

## 2024-01-26 DIAGNOSIS — S62.640A CLOSED NONDISPLACED FRACTURE OF PROXIMAL PHALANX OF RIGHT INDEX FINGER, INITIAL ENCOUNTER: ICD-10-CM

## 2024-01-26 DIAGNOSIS — M79.641 PAIN OF RIGHT HAND: Primary | ICD-10-CM

## 2024-01-26 PROCEDURE — 97763 ORTHC/PROSTC MGMT SBSQ ENC: CPT | Performed by: OCCUPATIONAL THERAPIST

## 2024-01-26 PROCEDURE — 97110 THERAPEUTIC EXERCISES: CPT | Performed by: OCCUPATIONAL THERAPIST

## 2024-01-26 NOTE — PROGRESS NOTES
[facility] ORTHOPAEDICS  [address] [place] [id number]  Dept: [phone]      Occupational Therapy Daily Note      Referring MD: Nato Day MD    Diagnosis:     ICD-10-CM    1. Pain of right hand  M79.641       2. Stiffness of finger joint of right hand  M25.641       3. Upper extremity weakness  R29.898       4. Closed nondisplaced fracture of proximal phalanx of right index finger, initial encounter [S62.640A]  S62.640A               Surgery: Date 9/25/23 Right index finger proximal phalanx open reduction internal fixation of articular fracture  12/26/23:  Right index finger plate removal, right index finger extensor tenolysis including EDC and lateral band, right index finger capsulectomy and joint release at the PIP joint level     Therapy precautions: None    History of injury/onset : Tripped over invisible fencing in her yard.     Total Direct Treatment Time: 45 min                       Total In Office Time: 55 min  Modifier needed: No  Episode visit count: 7    Preferred Name:  Mulugeta Clayton    SUBJECTIVE     Pt reports her finger does still get stiff when she is working most days but she tries to stretch when she is at home.     OBJECTIVE     Functional Outcome Measures: Quick Dash  45 score=   77.27 % functional deficit at IE  Hand/Side Dominance: right handed    Swelling/Edema: moderate : R IF PIP 6.4 cm, L IF PIP 5.8 cm  at IE  12/27/23: moderate, unable to assess due to drainage  Skin Integrity:  dorsal IF incision, sutures in tact.  Active bleeding distal proximal phalanx ,        Digital AROM:     IF   10/6/23 IF  12/4/23 IF 12/27/23 IF  12/29/23 IF 1/10/24 IF  1/22/24   AROM    MCP 0/45 /80 0/30   77   AROM      PIP 0/24 /55 0/26 /64 START /61  END /85 76   AROM      DIP 0/14 /45 0/15 /50 /45 52   Active flexion to DPC           Digital PROM IF  12/29/23 IF 1/3/23 IF 1/10/24   MCP 75     PIP 66 0/92 /97   DIP      Total AROM      Flexion to DPC          12/4/23: PROM

## 2024-02-01 ENCOUNTER — TREATMENT (OUTPATIENT)
Age: 44
End: 2024-02-01

## 2024-02-01 DIAGNOSIS — R29.898 UPPER EXTREMITY WEAKNESS: ICD-10-CM

## 2024-02-01 DIAGNOSIS — M25.641 STIFFNESS OF FINGER JOINT OF RIGHT HAND: ICD-10-CM

## 2024-02-01 DIAGNOSIS — M79.641 PAIN OF RIGHT HAND: Primary | ICD-10-CM

## 2024-02-01 DIAGNOSIS — S62.640A CLOSED NONDISPLACED FRACTURE OF PROXIMAL PHALANX OF RIGHT INDEX FINGER, INITIAL ENCOUNTER: ICD-10-CM

## 2024-02-01 NOTE — PROGRESS NOTES
in the index finger in order to facilitate tendon excursion and ROM, functional hand use    Long term goals: 2/20/2024  (8 weeks)   Pt will be able to use the affected UE for all ADL's without difficulty.  Pt will have adequate strength to be able to hold and open containers without difficulty.  Pt will be able to make a full composite fist with the involved hand to enable to grasp and hold objects.  Pt will have full active composite extension of affected side to be able to open hand to acquire items for ADL's.  Pt will lack 10 °  or less of PIP extension involved digit. (Not fully met)   Minimal edema in involved digit.   Improve Quick DASH functional assessment score from less than 20% deficit.      [employer] Portal     OT Protocols

## 2024-02-05 ENCOUNTER — TREATMENT (OUTPATIENT)
Age: 44
End: 2024-02-05
Payer: COMMERCIAL

## 2024-02-05 DIAGNOSIS — M25.641 STIFFNESS OF FINGER JOINT OF RIGHT HAND: ICD-10-CM

## 2024-02-05 DIAGNOSIS — S62.640A CLOSED NONDISPLACED FRACTURE OF PROXIMAL PHALANX OF RIGHT INDEX FINGER, INITIAL ENCOUNTER: ICD-10-CM

## 2024-02-05 DIAGNOSIS — M79.641 PAIN OF RIGHT HAND: Primary | ICD-10-CM

## 2024-02-05 DIAGNOSIS — R29.898 UPPER EXTREMITY WEAKNESS: ICD-10-CM

## 2024-02-05 PROCEDURE — 97110 THERAPEUTIC EXERCISES: CPT | Performed by: OCCUPATIONAL THERAPIST

## 2024-02-05 NOTE — PROGRESS NOTES
PROM PIP IF: 65 degrees     Treatment:       Therapeutic exercise (49118) x 45 min:  Moist hot pack to right hand prior to manual tx with coban wrap into composite flexion   IF AROM:   PIP blocking  DIP blocking  Reverse blocking   PROM MP-IP Index finger (low load, long duration) progressive stretch  Declining dowel red putty -  Removal of marbles from red theraputty using her IF   Education on PROM with use of coban wrap    Self wrapping and elastic strap for added IP stretch        Access Code: [email or web address]  URL: [email or web address]  Date: 01/03/2024  Prepared by: Ebony Adair    Exercises  - Seated Claw Fist AROM  - 5 x daily - 7 x weekly - 1 sets - 15 reps - 3 sec hold  - Seated Full Fist AROM  - 5 x daily - 7 x weekly - 1 sets - 15 reps - 3 sec hold  - Hand AROM FDS Glide  - 5 x daily - 7 x weekly - 1 sets - 15 reps - 3 sec hold  - Finger Extension or EDC Glides: Pen Roll From Fist to Hook Fist  - 5 x daily - 7 x weekly - 2-3 sets - 15 reps - 3 sec hold  - PIP Extension with Reverse Blocking  - 5 x daily - 7 x weekly - 2-3 sets - 15 reps - 3 sec hold  - Seated Finger Composite Flexion Stretch  - 5 x daily - 7 x weekly - 1 sets - 10 reps - 15 sec hold    ASSESSMENT      Persistent PIP stiffness. Able to loosen after stretch and heat. Pt educated on how to utilize coban on her own to apply composite stretch to her IF. She will return tomorrow to therapy to assess.     PLAN    Continue aggressive ROM, edema management with coban.  Tendon gliding F/E, reverse blocks.  Stm/scar mobilization.  Assess efficiency of coban for stretching   Measure  strength       GOALS      Short Term Goals: 1/23/24 (4 weeks)   Pt will have at least 70 degrees of active PIP flexion in the affected index finger to improve grasp and prehensile ability. MET 1/22/24, continue to measure for consistency   Pt will lack 10 degrees or less of PIP extension in the involved digit. MET  Pt will have at least 35 psi of

## 2024-02-06 ENCOUNTER — TREATMENT (OUTPATIENT)
Age: 44
End: 2024-02-06
Payer: COMMERCIAL

## 2024-02-06 DIAGNOSIS — M79.641 PAIN OF RIGHT HAND: Primary | ICD-10-CM

## 2024-02-06 DIAGNOSIS — S62.640A CLOSED NONDISPLACED FRACTURE OF PROXIMAL PHALANX OF RIGHT INDEX FINGER, INITIAL ENCOUNTER: ICD-10-CM

## 2024-02-06 DIAGNOSIS — M25.641 STIFFNESS OF FINGER JOINT OF RIGHT HAND: ICD-10-CM

## 2024-02-06 DIAGNOSIS — R29.898 UPPER EXTREMITY WEAKNESS: ICD-10-CM

## 2024-02-06 PROCEDURE — 97110 THERAPEUTIC EXERCISES: CPT | Performed by: OCCUPATIONAL THERAPIST

## 2024-02-06 NOTE — PROGRESS NOTES
strength in affected hand.   Pt will demonstrate minimal edema in the index finger in order to facilitate tendon excursion and ROM, functional hand use    Long term goals: 2/20/2024  (8 weeks)   Pt will be able to use the affected UE for all ADL's without difficulty.  Pt will have adequate strength to be able to hold and open containers without difficulty.  Pt will be able to make a full composite fist with the involved hand to enable to grasp and hold objects.  Pt will have full active composite extension of affected side to be able to open hand to acquire items for ADL's.  Pt will lack 10 °  or less of PIP extension involved digit. (Not fully met)   Minimal edema in involved digit.   Improve Quick DASH functional assessment score from less than 20% deficit.      [facility] Portal     OT Protocols

## 2024-11-08 ENCOUNTER — APPOINTMENT (OUTPATIENT)
Dept: ULTRASOUND IMAGING | Age: 44
End: 2024-11-08
Payer: COMMERCIAL

## 2024-11-08 ENCOUNTER — HOSPITAL ENCOUNTER (EMERGENCY)
Age: 44
Discharge: HOME | End: 2024-11-08
Payer: COMMERCIAL

## 2024-11-08 ENCOUNTER — APPOINTMENT (OUTPATIENT)
Dept: GENERAL RADIOLOGY | Age: 44
End: 2024-11-08
Payer: COMMERCIAL

## 2024-11-08 VITALS
BODY MASS INDEX: 25.71 KG/M2 | HEART RATE: 70 BPM | WEIGHT: 160 LBS | RESPIRATION RATE: 17 BRPM | OXYGEN SATURATION: 99 % | SYSTOLIC BLOOD PRESSURE: 122 MMHG | DIASTOLIC BLOOD PRESSURE: 80 MMHG | TEMPERATURE: 98.2 F | HEIGHT: 66 IN

## 2024-11-08 DIAGNOSIS — M25.562 ACUTE PAIN OF LEFT KNEE: Primary | ICD-10-CM

## 2024-11-08 PROCEDURE — 99284 EMERGENCY DEPT VISIT MOD MDM: CPT

## 2024-11-08 PROCEDURE — 73562 X-RAY EXAM OF KNEE 3: CPT

## 2024-11-08 PROCEDURE — 93971 EXTREMITY STUDY: CPT | Performed by: RADIOLOGY

## 2024-11-08 PROCEDURE — 93971 EXTREMITY STUDY: CPT

## 2024-11-08 ASSESSMENT — PAIN DESCRIPTION - DESCRIPTORS: DESCRIPTORS: DISCOMFORT

## 2024-11-08 ASSESSMENT — LIFESTYLE VARIABLES
HOW OFTEN DO YOU HAVE A DRINK CONTAINING ALCOHOL: MONTHLY OR LESS
HOW MANY STANDARD DRINKS CONTAINING ALCOHOL DO YOU HAVE ON A TYPICAL DAY: 1 OR 2

## 2024-11-08 ASSESSMENT — PAIN SCALES - GENERAL
PAINLEVEL_OUTOF10: 4
PAINLEVEL_OUTOF10: 2

## 2024-11-08 ASSESSMENT — PAIN - FUNCTIONAL ASSESSMENT
PAIN_FUNCTIONAL_ASSESSMENT: 0-10
PAIN_FUNCTIONAL_ASSESSMENT: 0-10

## 2024-11-08 ASSESSMENT — PAIN DESCRIPTION - LOCATION: LOCATION: KNEE

## 2024-11-08 ASSESSMENT — ENCOUNTER SYMPTOMS
ABDOMINAL PAIN: 0
SHORTNESS OF BREATH: 0

## 2024-11-08 ASSESSMENT — PAIN DESCRIPTION - ORIENTATION: ORIENTATION: LEFT

## 2024-11-08 NOTE — ED PROVIDER NOTES
provided by the patient.       ROS     Review of Systems   Constitutional:  Negative for fever.   Respiratory:  Negative for shortness of breath.    Cardiovascular:  Negative for chest pain.   Gastrointestinal:  Negative for abdominal pain.   Musculoskeletal:  Positive for arthralgias.   All other systems reviewed and are negative.       Physical Exam     Vitals signs and nursing note reviewed:  Vitals:    11/08/24 1248   BP: 120/85   Pulse: 72   Resp: 20   Temp: 98.4 °F (36.9 °C)   SpO2: 100%   Weight: 72.6 kg (160 lb)   Height: 1.676 m (5' 6\")      Physical Exam  Vitals and nursing note reviewed.   Constitutional:       General: She is not in acute distress.     Appearance: Normal appearance. She is well-developed. She is not ill-appearing, toxic-appearing or diaphoretic.   HENT:      Head: Normocephalic and atraumatic.   Cardiovascular:      Rate and Rhythm: Normal rate.      Pulses:           Dorsalis pedis pulses are 2+ on the left side.        Posterior tibial pulses are 2+ on the left side.   Pulmonary:      Effort: Pulmonary effort is normal. No respiratory distress.   Musculoskeletal:      Left knee: No swelling, deformity, effusion, erythema or ecchymosis. Normal range of motion. Tenderness present over the lateral joint line.      Right lower leg: No edema.      Left lower leg: Normal. No edema.      Comments: Mild tenderness with palpation to lateral and posterior left knee.  Patient has full range of motion of the knee but does report increased pain with full extension of the knee.  No swelling or effusions noted.   Neurological:      General: No focal deficit present.      Mental Status: She is alert and oriented to person, place, and time.        Procedures     Procedures    Orders placed during this emergency department visit:     Orders Placed This Encounter   Procedures    XR KNEE LEFT (3 VIEWS)    [facility] Knee Immobilizer, Left (); 20\"    Vascular duplex lower  extremity venous left        Medications given during this emergency department visit:   Medications - No data to display    New prescriptions:     New Prescriptions    No medications on file        Past History and Complexity:     Past Medical History:   Diagnosis Date    Anxiety     xanax    Hx of blood clots     after left knee ACL/meniscus tear DVT left calf; xarelto for 3 months then stopped - blood clot after surgery    IBS (irritable bowel syndrome)     Insomnia     ambien        Past Surgical History:   Procedure Laterality Date    BREAST SURGERY  2019    breast reduction    FINGER CLOSED REDUCTION Right 2023    ORIF right index finger performed by Nato Day MD at [facility]    GYN       x 1    GYN      laparoscopic    HAND SURGERY Right 2023    HAND HARDWARE REMOVAL right index finger performed by Nato Day MD at [facility]    HAND SURGERY Right 2023    PALMAR FASCIECTOMY right index finger performed by Nato Day MD at [facility]    HEENT  2011    sinus surgery        Social History     Socioeconomic History    Marital status:      Spouse name: None    Number of children: None    Years of education: None    Highest education level: None   Tobacco Use    Smoking status: Never    Smokeless tobacco: Never   Vaping Use    Vaping status: Never Used   Substance and Sexual Activity    Alcohol use: Yes     Comment: socially    Drug use: Not Currently     Social Determinants of Health     Financial Resource Strain: Low Risk  (2023)    Received from [facility]    Financial Resource Strain     Difficulty Paying Living Expenses: Not hard at all     Difficulty Paying Medical Expenses: No   Food Insecurity: No Food Insecurity (2023)    Received from [facility]    Food Insecurity     Worried about Running Out of Food in the Last Year: Never true     Ran Out of Food in the Last Year: Never true   Transportation Needs: No Transportation Needs

## 2024-11-08 NOTE — ED NOTES
I have reviewed discharge instructions with the patient.  The patient verbalized understanding.    Patient left ED via Discharge Method: ambulatory to Home with self.    Opportunity for questions and clarification provided.       Patient given 0 scripts.         To continue your aftercare when you leave the hospital, you may receive an automated call from our care team to check in on how you are doing.  This is a free service and part of our promise to provide the best care and service to meet your aftercare needs.” If you have questions, or wish to unsubscribe from this service please call [phone].  Thank you for Choosing our [facility] Emergency Department.

## 2024-11-08 NOTE — DISCHARGE INSTRUCTIONS
Wear the knee immobilizer when you are up during the day.  Use crutches when ambulating.  Continue Tylenol and Aleve to help with discomfort.  Apply ice pack for 10 or 15 minutes at a time when at rest to help with pain and swelling.  Elevate your knee when at rest.  Follow-up with orthopedics.  Return to the emergency department for any new, worsening, or concerning symptoms.

## 2024-11-08 NOTE — ED TRIAGE NOTES
Pt to ed with c/o left knee pain since yesterday after hearing a pop walking up a set up stairs. Pt reports having left knee surgery previously. Knee noted to be swollen on posterior side. Pt reports she has been taking Aleve/Tylenol for pain relief.

## 2024-11-25 RX ORDER — VALACYCLOVIR HYDROCHLORIDE 500 MG/1
TABLET, FILM COATED ORAL
COMMUNITY
Start: 2024-11-05

## 2024-11-25 RX ORDER — HYDROQUINONE 40 MG/G
CREAM TOPICAL
COMMUNITY
Start: 2024-11-05

## 2024-11-25 RX ORDER — BENZONATATE 200 MG/1
200 CAPSULE ORAL 3 TIMES DAILY PRN
COMMUNITY
Start: 2024-02-19

## 2024-11-25 RX ORDER — PHENAZOPYRIDINE HYDROCHLORIDE 100 MG/1
100 TABLET, FILM COATED ORAL 3 TIMES DAILY PRN
COMMUNITY
Start: 2024-05-18

## 2024-11-27 ENCOUNTER — OFFICE VISIT (OUTPATIENT)
Dept: ORTHOPEDIC SURGERY | Age: 44
End: 2024-11-27
Payer: COMMERCIAL

## 2024-11-27 DIAGNOSIS — M25.562 LEFT KNEE PAIN, UNSPECIFIED CHRONICITY: Primary | ICD-10-CM

## 2024-11-27 DIAGNOSIS — S83.241A ACUTE MEDIAL MENISCUS TEAR, RIGHT, INITIAL ENCOUNTER: ICD-10-CM

## 2024-11-27 DIAGNOSIS — Z98.890 HX OF ANTERIOR CRUCIATE LIGAMENT TEAR RECONSTRUCTION: ICD-10-CM

## 2024-11-27 PROCEDURE — 99214 OFFICE O/P EST MOD 30 MIN: CPT | Performed by: ORTHOPAEDIC SURGERY

## 2024-11-27 NOTE — PROGRESS NOTES
Number of children: Not on file    Years of education: Not on file    Highest education level: Not on file   Occupational History    Not on file   Tobacco Use    Smoking status: Never    Smokeless tobacco: Never   Vaping Use    Vaping status: Never Used   Substance and Sexual Activity    Alcohol use: Yes     Comment: socially    Drug use: Not Currently    Sexual activity: Not on file   Other Topics Concern    Not on file   Social History Narrative    Not on file     Social Determinants of Health     Financial Resource Strain: Low Risk  (5/8/2023)    Received from [email or web address]    Financial Resource Strain     Difficulty Paying Living Expenses: Not hard at all     Difficulty Paying Medical Expenses: No   Food Insecurity: No Food Insecurity (5/8/2023)    Received from [email or web address]    Food Insecurity     Worried about Running Out of Food in the Last Year: Never true     Ran Out of Food in the Last Year: Never true   Transportation Needs: No Transportation Needs (5/8/2023)    Received from [email or web address]    Transportation Needs     Lack of Transportation: No   Physical Activity: Insufficiently Active (5/8/2023)    Received from [email or web address]    Physical Activity     Days of Exercise per Week: 3     Minutes of Exercise per Session: 40     Total Minutes of Exercise per Week: 120   Stress: No Stress Concern Present (5/8/2023)    Received from [email or web address]    Stress     Feeling of Stress : Only a little   Social Connections: Unknown (5/8/2024)    Received from [employer]    Social Connections     Frequency of Communication with Friends and Family: Not asked     Frequency of Social Gatherings with Friends and Family: Not asked   Intimate Partner Violence: Unknown (5/8/2023)    Received from [email or web address]    Intimate Partner Violence     Fear of Current or Ex-Partner: Not asked     Emotionally Abused: Not asked     Physically

## 2025-01-29 ENCOUNTER — TELEPHONE (OUTPATIENT)
Dept: ORTHOPEDIC SURGERY | Age: 45
End: 2025-01-29

## 2025-01-29 NOTE — TELEPHONE ENCOUNTER
MRI LEFT KNEE APPROVAL         Order Request Summary   Health Plan:  [facility]  Scheduled Date of Service:  2/5/2025            Order ID: [id number]         Authorized    Approval Valid Through: 01/29/2025 - 03/29/2025       This order is not a guarantee of payment except when required by applicable law. When applicable law allows, payment is subject to the member's active enrollment, benefit limitation and other terms of the member's contract at the time of services provided.               Member Information:  ANA PAULA CASTAÑEDA  Member #: [id number]  [address] [place], [place] [id number]  YOB: 1980  Phone: [phone] Ordering Provider:   GEORGIE BRICENO  [address] [place] [id number]  Phone: [phone]    NPI: [id number]  Servicing Provider:      [facility]  [address] [place] [id number]  Phone: [phone]  Fax: [phone]  NPI: [id number]  TIN:

## 2025-02-14 RX ORDER — PREDNISONE 20 MG/1
40 TABLET ORAL DAILY
COMMUNITY
Start: 2024-12-15

## 2025-02-19 ENCOUNTER — OFFICE VISIT (OUTPATIENT)
Dept: ORTHOPEDIC SURGERY | Age: 45
End: 2025-02-19
Payer: COMMERCIAL

## 2025-02-19 DIAGNOSIS — S83.242D ACUTE MEDIAL MENISCUS TEAR OF LEFT KNEE, SUBSEQUENT ENCOUNTER: Primary | ICD-10-CM

## 2025-02-19 DIAGNOSIS — M25.562 LEFT KNEE PAIN, UNSPECIFIED CHRONICITY: ICD-10-CM

## 2025-02-19 DIAGNOSIS — Z98.890 HX OF ANTERIOR CRUCIATE LIGAMENT TEAR RECONSTRUCTION: ICD-10-CM

## 2025-02-19 DIAGNOSIS — S83.282A ACUTE LATERAL MENISCUS TEAR OF LEFT KNEE, INITIAL ENCOUNTER: ICD-10-CM

## 2025-02-19 PROBLEM — S83.242A ACUTE MEDIAL MENISCUS TEAR OF LEFT KNEE: Status: ACTIVE | Noted: 2021-03-09

## 2025-02-19 PROCEDURE — 99214 OFFICE O/P EST MOD 30 MIN: CPT | Performed by: ORTHOPAEDIC SURGERY

## 2025-02-19 NOTE — PROGRESS NOTES
Name: Mulugeta Monroy  YOB: 1980  Gender: female  MRN: [id number]    CC:   Chief Complaint   Patient presents with    Follow-up     L Knee MRI Results, DOI 11/08/24        History of Present Illness  The patient is a 44-year-old male who presents for a follow-up of his left knee.    He reports persistent discomfort in his left knee, which is exacerbated by daily ambulation. The pain is localized to the anterior and posterior aspects of the knee. He describes a sensation of instability in the knee, accompanied by mild pain. He has been refraining from his usual exercise regimen due to concerns about potential reinjury. He expresses a desire to regain his previous level of activity without being hindered by his knee condition. He also mentions the presence of several cysts and bone spurs. He has been managing his symptoms with Motrin since November 2024. He recalls an incident in either November 2024 or December 2024 when he experienced a sudden onset of pain while ascending stairs.    FAMILY HISTORY  Her father had a total knee replacement.    MEDICATIONS  Current: Motrin    Recall: Patient is status post left ACL allograft with medial meniscus repair in March 2021.  She has not been seen since his 2-month postop visit.  Attempts were made to contact the patient in regards to follow-up which were documented in chart.  The patient went to the emergency department 11-8-2024 after ambulating down the stairs  with her dog and feeling a pop in the knee.  Patient notes anterior and posterior knee pain.  Does note swelling.  Does note sensation of locking.  Denies numbness and tingling.  Has been taking Aleve 3 pills multiple times a day.     Allergies   Allergen Reactions    Sulfa Antibiotics Anaphylaxis     Other reaction(s): Anaphylactic shock-Allergy    Adhesive Tape Rash     Past Medical History:   Diagnosis Date    Anxiety     xanax    Hx of blood clots     after left knee ACL/meniscus tear DVT

## 2025-02-28 DIAGNOSIS — S83.282A ACUTE LATERAL MENISCUS TEAR OF LEFT KNEE, INITIAL ENCOUNTER: ICD-10-CM

## 2025-02-28 DIAGNOSIS — S83.242D ACUTE MEDIAL MENISCUS TEAR OF LEFT KNEE, SUBSEQUENT ENCOUNTER: Primary | ICD-10-CM

## 2025-02-28 DIAGNOSIS — M25.562 LEFT KNEE PAIN, UNSPECIFIED CHRONICITY: ICD-10-CM

## 2025-03-06 DIAGNOSIS — S83.242D ACUTE MEDIAL MENISCUS TEAR OF LEFT KNEE, SUBSEQUENT ENCOUNTER: Primary | ICD-10-CM

## 2025-03-18 NOTE — PERIOP NOTE
Patient verified name and .  Order for consent NOT found in EHR at time of PAT visit. Unable to verify case posting against order; surgery verified by patient.     Type 1B surgery, phone assessment complete.  Orders were not received.  Labs per surgeon: none at time of PAT phone assessment  Labs per anesthesia protocol: not indicated    Patient answered medical/surgical history questions at their best of ability. All prior to admission medications documented in EPIC.    Patient instructed to continue taking all prescription medications up to the day of surgery but to take only the following medications the day of surgery according to anesthesia guidelines with a small sip of water:   ALPRAZOLAM (XANAX) if needed   Also, patient is requested to take 2 Tylenol in the morning and then again before bed on the day before surgery. Regular or extra strength may be used.       Patient informed that all vitamins and supplements should be held 7 days prior to surgery and NSAIDS 5 days prior to surgery. Prescription meds to hold: none    Patient instructed on the following:    > Arrive at [facility] ([address]) Entrance, time of arrival to be called the day before by 1700  > No food after midnight, patient may drink clear liquids up until 2 hours prior to arrival. No gum, candy, mints.   > Responsible adult must drive patient to the hospital, stay during surgery, and patient will need supervision 24 hours after anesthesia  > Use non moisturizing soap in shower the night before surgery and on the morning of surgery  > All piercings must be removed prior to arrival.    > Leave all valuables (money and jewelry) at home but bring insurance card and ID on DOS.   > You may be required to pay a deductible or co-pay on the day of your procedure. You can pre-pay by calling [phone] if your surgery is at the [facility] or [phone] if your surgery is at the [facility].  > Do not wear make-up, nail polish, lotions,

## 2025-03-21 NOTE — DISCHARGE INSTRUCTIONS
Postoperative Instructions - Knee Arthroscopy    Please note these are general instructions for postoperative care.  Specific instructions may be given regarding the type of surgery that you have undergone.    Postoperative dressings may be removed after 2 to 3 days.  Dressings should be kept dry for the first several days.  Following removal of the dressing, wounds should be kept dry when showering.  A large trash bag taped to the thigh can work well.  Do not soak wounds in the tub prior to suture removal.      All steri-strip tapes across the wound should be left in place if your incisions have them.    Some discomfort is expected following any operation.  You will be given a prescription for pain medication along with instructions for its use.  Many pain medications contain Tylenol. Do not take additional Tylenol if you are currently taking the prescribed pain medications. Do not drive while taking pain medications. Do not make important personal or business decisions or sign legal documents the first 24 hours after surgery. If your pain is not adequately controlled, contact your surgeon on call at the numbers on this sheet.    Following simple knee arthroscopy crutches are not usually utilized or are used for only 1 or 2 days.  Working on regaining full motion of the knee is encouraged. Bending and straightening the knee and performing ankle range of motion is encouraged to prevent blood clots. Try to use a stationary bike without resistance within the first week of surgery to help regain motion.    Elevating the lower extremity after surgery is helpful in the first few days postoperatively.  This can help control swelling.  Applying ice for 15 to 20 minutes per hour to the surgical site is often helpful in decreasing pain and swelling.    As pain subsides, discontinuation of narcotic medication is recommended and switching to Tylenol or over the counter anti-inflammatory medication is desirable.    Pain

## 2025-03-21 NOTE — PERIOP NOTE
Preop department called to notify patient of arrival time for scheduled procedure. Instructions given to   - Arrive at [facility] Entrance [address].  - Nothing to eat after midnight unless otherwise indicated. No gum, mints, or ice chips. You may have clear liquids two hours prior to arrival to the hospital.   - Have a responsible adult to drive patient to the hospital, stay during surgery, and patient will need supervision 24 hours after anesthesia.   - Use antibacterial soap in shower the night before surgery and on the morning of surgery.       Was patient contacted: yes, pt  Voicemail left: n/a  Numbers contacted: [phone]   Arrival time: 0530  Time to stop clear liquids: 0330

## 2025-03-21 NOTE — H&P
Outpatient Surgery History and Physical:  Mulugeta Monroy was seen and examined.    CHIEF COMPLAINT:   Left knee pain.     PE:   /68   Pulse 78   Temp 97.8 °F (36.6 °C) (Temporal)   Resp 18   Ht 1.676 m (5' 6\")   Wt 67.1 kg (148 lb)   LMP 2025   SpO2 99%   BMI 23.89 kg/m²     Heart:   Regular rhythm, regular pulses.    Lungs:  Are clear, non-labored respirations.     Principal Problem:    Acute medial meniscus tear of left knee  Active Problems:    Left knee pain    Acute lateral meniscus tear of left knee  Resolved Problems:    * No resolved hospital problems. *      Past Medical History:    Past Medical History:   Diagnosis Date    Anxiety     xanax    Hx of blood clots     after left knee ACL/meniscus tear DVT left calf; xarelto for 3 months then stopped - blood clot after surgery    IBS (irritable bowel syndrome)     Insomnia     ambien       Surgical History:   Past Surgical History:   Procedure Laterality Date    BREAST SURGERY  2019    breast reduction    FINGER CLOSED REDUCTION Right 2023    ORIF right index finger performed by Nato Day MD at [facility]    GYN       x 1    GYN      laparoscopic    HAND SURGERY Right 2023    HAND HARDWARE REMOVAL right index finger performed by Nato Day MD at [facility]    HAND SURGERY Right 2023    PALMAR FASCIECTOMY right index finger performed by Nato Day MD at [facility]    HEENT      sinus surgery       Social History: Patient  reports that she has never smoked. She has never used smokeless tobacco. She reports current alcohol use of about 1.0 standard drink of alcohol per week. She reports that she does not currently use drugs.    Family History:   Family History   Problem Relation Age of Onset    No Known Problems Mother     Cancer Father         prostate       Allergies: Reviewed per EMR  Allergies   Allergen Reactions    Sulfa Antibiotics Anaphylaxis     Other reaction(s): Anaphylactic shock-Allergy

## 2025-03-23 DIAGNOSIS — S83.242D ACUTE MEDIAL MENISCUS TEAR OF LEFT KNEE, SUBSEQUENT ENCOUNTER: Primary | ICD-10-CM

## 2025-03-23 RX ORDER — ONDANSETRON 8 MG/1
4 TABLET, ORALLY DISINTEGRATING ORAL EVERY 6 HOURS
Qty: 16 TABLET | Refills: 0 | Status: SHIPPED | OUTPATIENT
Start: 2025-03-23

## 2025-03-23 RX ORDER — AMOXICILLIN 250 MG
1 CAPSULE ORAL DAILY
Qty: 21 TABLET | Refills: 0 | Status: SHIPPED | OUTPATIENT
Start: 2025-03-23

## 2025-03-23 RX ORDER — HYDROCODONE BITARTRATE AND ACETAMINOPHEN 7.5; 325 MG/1; MG/1
1-2 TABLET ORAL EVERY 6 HOURS PRN
Qty: 30 TABLET | Refills: 0 | Status: SHIPPED | OUTPATIENT
Start: 2025-03-23 | End: 2025-03-28

## 2025-03-24 ENCOUNTER — ANESTHESIA EVENT (OUTPATIENT)
Dept: SURGERY | Age: 45
End: 2025-03-24
Payer: COMMERCIAL

## 2025-03-24 ENCOUNTER — HOSPITAL ENCOUNTER (OUTPATIENT)
Age: 45
Setting detail: OUTPATIENT SURGERY
Discharge: HOME | End: 2025-03-24
Attending: ORTHOPAEDIC SURGERY | Admitting: ORTHOPAEDIC SURGERY
Payer: COMMERCIAL

## 2025-03-24 ENCOUNTER — ANESTHESIA (OUTPATIENT)
Dept: SURGERY | Age: 45
End: 2025-03-24
Payer: COMMERCIAL

## 2025-03-24 VITALS
TEMPERATURE: 97.4 F | OXYGEN SATURATION: 100 % | HEART RATE: 79 BPM | DIASTOLIC BLOOD PRESSURE: 71 MMHG | RESPIRATION RATE: 20 BRPM | WEIGHT: 148 LBS | BODY MASS INDEX: 23.78 KG/M2 | SYSTOLIC BLOOD PRESSURE: 111 MMHG | HEIGHT: 66 IN

## 2025-03-24 PROCEDURE — 7100000010 HC PHASE II RECOVERY - FIRST 15 MIN: Performed by: ORTHOPAEDIC SURGERY

## 2025-03-24 PROCEDURE — 2720000010 HC SURG SUPPLY STERILE: Performed by: ORTHOPAEDIC SURGERY

## 2025-03-24 PROCEDURE — 6360000002 HC RX W HCPCS

## 2025-03-24 PROCEDURE — 2500000003 HC RX 250 WO HCPCS

## 2025-03-24 PROCEDURE — 3600000014 HC SURGERY LEVEL 4 ADDTL 15MIN: Performed by: ORTHOPAEDIC SURGERY

## 2025-03-24 PROCEDURE — 2580000003 HC RX 258: Performed by: ANESTHESIOLOGY

## 2025-03-24 PROCEDURE — 7100000001 HC PACU RECOVERY - ADDTL 15 MIN: Performed by: ORTHOPAEDIC SURGERY

## 2025-03-24 PROCEDURE — 7100000000 HC PACU RECOVERY - FIRST 15 MIN: Performed by: ORTHOPAEDIC SURGERY

## 2025-03-24 PROCEDURE — 2709999900 HC NON-CHARGEABLE SUPPLY: Performed by: ORTHOPAEDIC SURGERY

## 2025-03-24 PROCEDURE — 3700000000 HC ANESTHESIA ATTENDED CARE: Performed by: ORTHOPAEDIC SURGERY

## 2025-03-24 PROCEDURE — 3700000001 HC ADD 15 MINUTES (ANESTHESIA): Performed by: ORTHOPAEDIC SURGERY

## 2025-03-24 PROCEDURE — 6360000002 HC RX W HCPCS: Performed by: ORTHOPAEDIC SURGERY

## 2025-03-24 PROCEDURE — 29880 ARTHRS KNE SRG MNISECTMY M&L: CPT | Performed by: ORTHOPAEDIC SURGERY

## 2025-03-24 PROCEDURE — 6360000002 HC RX W HCPCS: Performed by: PHYSICIAN ASSISTANT

## 2025-03-24 PROCEDURE — 3600000004 HC SURGERY LEVEL 4 BASE: Performed by: ORTHOPAEDIC SURGERY

## 2025-03-24 RX ORDER — SODIUM CHLORIDE, SODIUM LACTATE, POTASSIUM CHLORIDE, CALCIUM CHLORIDE 600; 310; 30; 20 MG/100ML; MG/100ML; MG/100ML; MG/100ML
INJECTION, SOLUTION INTRAVENOUS CONTINUOUS
Status: DISCONTINUED | OUTPATIENT
Start: 2025-03-24 | End: 2025-03-24 | Stop reason: HOSPADM

## 2025-03-24 RX ORDER — FENTANYL CITRATE 50 UG/ML
INJECTION, SOLUTION INTRAMUSCULAR; INTRAVENOUS
Status: DISCONTINUED | OUTPATIENT
Start: 2025-03-24 | End: 2025-03-24 | Stop reason: SDUPTHER

## 2025-03-24 RX ORDER — LIDOCAINE HYDROCHLORIDE 10 MG/ML
1 INJECTION, SOLUTION INFILTRATION; PERINEURAL
Status: DISCONTINUED | OUTPATIENT
Start: 2025-03-24 | End: 2025-03-24 | Stop reason: HOSPADM

## 2025-03-24 RX ORDER — ONDANSETRON 2 MG/ML
INJECTION INTRAMUSCULAR; INTRAVENOUS
Status: DISCONTINUED | OUTPATIENT
Start: 2025-03-24 | End: 2025-03-24 | Stop reason: SDUPTHER

## 2025-03-24 RX ORDER — DEXAMETHASONE SODIUM PHOSPHATE 10 MG/ML
INJECTION, SOLUTION INTRA-ARTICULAR; INTRALESIONAL; INTRAMUSCULAR; INTRAVENOUS; SOFT TISSUE
Status: DISCONTINUED | OUTPATIENT
Start: 2025-03-24 | End: 2025-03-24 | Stop reason: SDUPTHER

## 2025-03-24 RX ORDER — TRANEXAMIC ACID 100 MG/ML
INJECTION, SOLUTION INTRAVENOUS
Status: DISCONTINUED | OUTPATIENT
Start: 2025-03-24 | End: 2025-03-24 | Stop reason: SDUPTHER

## 2025-03-24 RX ORDER — FENTANYL CITRATE 50 UG/ML
100 INJECTION, SOLUTION INTRAMUSCULAR; INTRAVENOUS
Status: DISCONTINUED | OUTPATIENT
Start: 2025-03-24 | End: 2025-03-24 | Stop reason: HOSPADM

## 2025-03-24 RX ORDER — MIDAZOLAM HYDROCHLORIDE 5 MG/5ML
4 INJECTION, SOLUTION INTRAMUSCULAR; INTRAVENOUS
Status: DISCONTINUED | OUTPATIENT
Start: 2025-03-24 | End: 2025-03-24 | Stop reason: HOSPADM

## 2025-03-24 RX ORDER — PROPOFOL 10 MG/ML
INJECTION, EMULSION INTRAVENOUS
Status: DISCONTINUED | OUTPATIENT
Start: 2025-03-24 | End: 2025-03-24 | Stop reason: SDUPTHER

## 2025-03-24 RX ORDER — NALOXONE HYDROCHLORIDE 0.4 MG/ML
INJECTION, SOLUTION INTRAMUSCULAR; INTRAVENOUS; SUBCUTANEOUS PRN
Status: DISCONTINUED | OUTPATIENT
Start: 2025-03-24 | End: 2025-03-24 | Stop reason: HOSPADM

## 2025-03-24 RX ORDER — ROPIVACAINE HYDROCHLORIDE 5 MG/ML
INJECTION, SOLUTION EPIDURAL; INFILTRATION; PERINEURAL PRN
Status: DISCONTINUED | OUTPATIENT
Start: 2025-03-24 | End: 2025-03-24 | Stop reason: ALTCHOICE

## 2025-03-24 RX ORDER — KETOROLAC TROMETHAMINE 30 MG/ML
INJECTION, SOLUTION INTRAMUSCULAR; INTRAVENOUS
Status: DISCONTINUED | OUTPATIENT
Start: 2025-03-24 | End: 2025-03-24 | Stop reason: SDUPTHER

## 2025-03-24 RX ORDER — SODIUM CHLORIDE 0.9 % (FLUSH) 0.9 %
5-40 SYRINGE (ML) INJECTION EVERY 8 HOURS
Status: DISCONTINUED | OUTPATIENT
Start: 2025-03-24 | End: 2025-03-24 | Stop reason: HOSPADM

## 2025-03-24 RX ORDER — LIDOCAINE HYDROCHLORIDE 20 MG/ML
INJECTION, SOLUTION EPIDURAL; INFILTRATION; INTRACAUDAL; PERINEURAL
Status: DISCONTINUED | OUTPATIENT
Start: 2025-03-24 | End: 2025-03-24 | Stop reason: SDUPTHER

## 2025-03-24 RX ORDER — SODIUM CHLORIDE 0.9 % (FLUSH) 0.9 %
5-40 SYRINGE (ML) INJECTION PRN
Status: DISCONTINUED | OUTPATIENT
Start: 2025-03-24 | End: 2025-03-24 | Stop reason: HOSPADM

## 2025-03-24 RX ORDER — OXYCODONE HYDROCHLORIDE 5 MG/1
5 TABLET ORAL
Status: DISCONTINUED | OUTPATIENT
Start: 2025-03-24 | End: 2025-03-24 | Stop reason: HOSPADM

## 2025-03-24 RX ORDER — MIDAZOLAM HYDROCHLORIDE 1 MG/ML
INJECTION, SOLUTION INTRAMUSCULAR; INTRAVENOUS
Status: DISCONTINUED | OUTPATIENT
Start: 2025-03-24 | End: 2025-03-24 | Stop reason: SDUPTHER

## 2025-03-24 RX ORDER — ONDANSETRON 2 MG/ML
4 INJECTION INTRAMUSCULAR; INTRAVENOUS
Status: DISCONTINUED | OUTPATIENT
Start: 2025-03-24 | End: 2025-03-24 | Stop reason: HOSPADM

## 2025-03-24 RX ADMIN — SODIUM CHLORIDE, SODIUM LACTATE, POTASSIUM CHLORIDE, AND CALCIUM CHLORIDE: .6; .31; .03; .02 INJECTION, SOLUTION INTRAVENOUS at 06:24

## 2025-03-24 RX ADMIN — FENTANYL CITRATE 25 MCG: 50 INJECTION, SOLUTION INTRAMUSCULAR; INTRAVENOUS at 07:26

## 2025-03-24 RX ADMIN — ONDANSETRON 4 MG: 2 INJECTION, SOLUTION INTRAMUSCULAR; INTRAVENOUS at 07:36

## 2025-03-24 RX ADMIN — HYDROMORPHONE HYDROCHLORIDE 0.2 MG: 0.5 INJECTION, SOLUTION INTRAMUSCULAR; INTRAVENOUS; SUBCUTANEOUS at 07:36

## 2025-03-24 RX ADMIN — LIDOCAINE HYDROCHLORIDE 100 MG: 20 INJECTION, SOLUTION EPIDURAL; INFILTRATION; INTRACAUDAL; PERINEURAL at 07:04

## 2025-03-24 RX ADMIN — MIDAZOLAM 2 MG: 1 INJECTION INTRAMUSCULAR; INTRAVENOUS at 06:58

## 2025-03-24 RX ADMIN — PHENYLEPHRINE HYDROCHLORIDE 100 MCG: 0.1 INJECTION, SOLUTION INTRAVENOUS at 07:46

## 2025-03-24 RX ADMIN — DEXAMETHASONE SODIUM PHOSPHATE 10 MG: 10 INJECTION INTRAMUSCULAR; INTRAVENOUS at 07:09

## 2025-03-24 RX ADMIN — Medication 2000 MG: at 07:09

## 2025-03-24 RX ADMIN — FENTANYL CITRATE 25 MCG: 50 INJECTION, SOLUTION INTRAMUSCULAR; INTRAVENOUS at 07:13

## 2025-03-24 RX ADMIN — TRANEXAMIC ACID 1000 MG: 100 INJECTION, SOLUTION INTRAVENOUS at 07:06

## 2025-03-24 RX ADMIN — FENTANYL CITRATE 25 MCG: 50 INJECTION, SOLUTION INTRAMUSCULAR; INTRAVENOUS at 07:24

## 2025-03-24 RX ADMIN — KETOROLAC TROMETHAMINE 30 MG: 30 INJECTION, SOLUTION INTRAMUSCULAR at 07:36

## 2025-03-24 RX ADMIN — PROPOFOL 200 MG: 10 INJECTION, EMULSION INTRAVENOUS at 07:04

## 2025-03-24 RX ADMIN — FENTANYL CITRATE 25 MCG: 50 INJECTION, SOLUTION INTRAMUSCULAR; INTRAVENOUS at 07:10

## 2025-03-24 ASSESSMENT — PAIN SCALES - GENERAL
PAINLEVEL_OUTOF10: 0
PAINLEVEL_OUTOF10: 2

## 2025-03-24 NOTE — OP NOTE
Name: Mulugeta Monroy  YOB: 1980  Gender: female  MRN: [id number]    Operative Note    Preoperative diagnosis:  Acute medial meniscus tear of left knee, subsequent encounter [S83.242D]  Left knee pain, unspecified chronicity [M25.562]  Acute lateral meniscus tear of left knee, initial encounter [S83.282A]    Postoperative diagnosis: Left knee pain, mild chondromalacia, medial meniscus degenerative tear, lateral meniscus free edge degenerative tear    Surgeons and Role:     * FELY Haney MD - Primary     Assistant: GETACHEW Bauer, assisted during the procedure.  She was necessary for patient positioning, wound closure, and assistance with the major portions of the operation.  Her presence decreased the operative time and potential complication rate.           Anesthesia: Choice    Tourniquet Time: * Missing tourniquet times found for documented tourniquets in lo *       Antibiotics: Ancef 2 gram IV    Procedures:  Procedure(s):  LEFT KNEE ARTHROSCOPY PARTIAL  MEDIAL/LATERAL MENISCECTOMY     Findings:  1. EUA -   - lachman's and stable- pivot shift. Stable to varus and valgus.  2. PFJ - Normal patellar cartilage.  Trochlea had some chondromalacia grade 2-3 superiorly  3. Medial Joint - there was a medial meniscus tear, it was very degenerative and complex in nature.  Prior repair area was noted as well.  The cartilage appeared more normal on the tibial surface but on the femoral surface there was some central grade II chondromalacia  4. Lateral joint -there was also a lateral meniscus tear, degenerative free edge tear along the body and anterior horn..  The cartilage appeared normal on the femur but there was some grade II chondromalacia on the tibial plateau  5. PCL - stable and intact  6. ACL -  stable and the graft appeared intact.     Indications / Consent:   this is a patient with symptoms compatible with a medial meniscus tear. After previous discussions and treatments using both

## 2025-03-24 NOTE — ANESTHESIA POSTPROCEDURE EVALUATION
Department of Anesthesiology  Postprocedure Note    Patient: Mulugeta Monroy  MRN: [id number]  YOB: 1980  Date of evaluation: 3/24/2025    Procedure Summary       Date: 03/24/25 Room / Location: [facility] OR 07 / [facility]    Anesthesia Start: 0654 Anesthesia Stop: 0748    Procedure: LEFT KNEE ARTHROSCOPY PARTIAL  MEDIAL/LATERAL MENISCECTOMY (Left: Knee) Diagnosis:       Acute medial meniscus tear of left knee, subsequent encounter      Left knee pain, unspecified chronicity      Acute lateral meniscus tear of left knee, initial encounter      (Acute medial meniscus tear of left knee, subsequent encounter [S83.242D])      (Left knee pain, unspecified chronicity [M25.562])      (Acute lateral meniscus tear of left knee, initial encounter [S83.282A])    Surgeons: FELY Haney MD Responsible Provider: Anthony Gonzalez Jr., MD    Anesthesia Type: General ASA Status: 1            Anesthesia Type: General    Carmen Phase I: Carmen Score: 6    Carmen Phase II:      Anesthesia Post Evaluation    Patient location during evaluation: PACU  Patient participation: complete - patient participated  Level of consciousness: awake  Pain score: 0  Airway patency: patent  Nausea & Vomiting: no nausea and no vomiting  Cardiovascular status: blood pressure returned to baseline and hemodynamically stable  Respiratory status: acceptable, spontaneous ventilation and nonlabored ventilation  Hydration status: euvolemic  Multimodal analgesia pain management approach  Pain management: adequate    No notable events documented.

## 2025-03-24 NOTE — ANESTHESIA PRE PROCEDURE
Department of Anesthesiology  Preprocedure Note       Name:  Mulugeta Monroy   Age:  44 y.o.  :  1980                                          MRN:  [id number]         Date:  3/24/2025      Surgeon: Surgeon(s):  FELY Haney MD    Procedure: Procedure(s):  LEFT KNEE ARTHROSCOPY MEDIAL MENISCECTOMY    SUPINE    Medications prior to admission:   Prior to Admission medications    Medication Sig Start Date End Date Taking? Authorizing Provider   ALPRAZolam (XANAX) 0.25 MG tablet Take 1 tablet by mouth every morning. 22  Yes Merari Barnes MD   zolpidem (AMBIEN CR) 12.5 MG extended release tablet Take 1 tablet by mouth. 19  Yes Automatic Reconciliation, Ar   naloxone (NARCAN) 4 MG/0.1ML LIQD nasal spray 1 spray by Nasal route as needed for Opioid Reversal 3/23/25   Stefan Cardona PA   senna-docusate (PERICOLACE) 8.6-50 MG per tablet Take 1 tablet by mouth daily Take for constipation prophylaxis 3/23/25   Stefan Cardona PA   ondansetron (ZOFRAN-ODT) 8 MG TBDP disintegrating tablet Take 0.5 tablets by mouth every 6 hours Take 20 minutes prior to pain medication for nausea prevention 3/23/25   Stefan Cardona PA   HYDROcodone-acetaminophen (NORCO) 7.5-325 MG per tablet Take 1-2 tablets by mouth every 6 hours as needed for Pain for up to 5 days. Intended supply: 5 days. Take lowest dose possible to manage pain Max Daily Amount: 8 tablets 3/23/25 3/28/25  Stefan Cardona PA   apixaban (ELIQUIS) 2.5 MG TABS tablet Take 1 tablet by mouth 2 times daily for 14 days 3/25/25 4/8/25  Stefan Cardona PA   EPINEPHrine (EPIPEN) 0.3 MG/0.3ML SOAJ injection Inject 0.3 mLs into the muscle as needed  Patient not taking: Reported on 3/18/2025 3/1/18   ProviderMerari MD       Current medications:    Current Facility-Administered Medications   Medication Dose Route Frequency Provider Last Rate Last Admin   • ceFAZolin (ANCEF) 2000 mg in sterile water 20 mL IV syringe  2,000 mg IntraVENous On Call to OR

## 2025-03-26 ENCOUNTER — EVALUATION (OUTPATIENT)
Age: 45
End: 2025-03-26
Payer: COMMERCIAL

## 2025-03-26 DIAGNOSIS — Z74.09 DECREASED FUNCTIONAL MOBILITY AND ENDURANCE: ICD-10-CM

## 2025-03-26 DIAGNOSIS — R29.898 WEAKNESS OF LEFT LOWER EXTREMITY: ICD-10-CM

## 2025-03-26 DIAGNOSIS — Z74.09 IMPAIRED MOBILITY AND ADLS: ICD-10-CM

## 2025-03-26 DIAGNOSIS — Z78.9 IMPAIRED MOBILITY AND ADLS: ICD-10-CM

## 2025-03-26 DIAGNOSIS — Z78.9 IMPAIRED MOTOR CONTROL: ICD-10-CM

## 2025-03-26 DIAGNOSIS — M25.562 ACUTE PAIN OF LEFT KNEE: Primary | ICD-10-CM

## 2025-03-26 DIAGNOSIS — M25.662 DECREASED RANGE OF MOTION (ROM) OF LEFT KNEE: ICD-10-CM

## 2025-03-26 PROCEDURE — 97016 VASOPNEUMATIC DEVICE THERAPY: CPT

## 2025-03-26 PROCEDURE — 97110 THERAPEUTIC EXERCISES: CPT

## 2025-03-26 PROCEDURE — 97161 PT EVAL LOW COMPLEX 20 MIN: CPT

## 2025-03-26 PROCEDURE — 97116 GAIT TRAINING THERAPY: CPT

## 2025-03-26 NOTE — PROGRESS NOTES
[facility]TH[facility]  [address] [place] [id number]  Dept: [phone]      Physical Therapy Initial Assessment     Referring MD: FELY Haney MD  Diagnosis:     ICD-10-CM    1. Acute pain of left knee  M25.562       2. Decreased range of motion (ROM) of left knee  M25.662       3. Weakness of left lower extremity  R29.898       4. Impaired motor control  Z78.9       5. Impaired mobility and ADLs  Z74.09     Z78.9       6. Decreased functional mobility and endurance  Z74.09          Surgery: Left Knee Arthroscopy Partial  Medial/lateral Meniscectomy - Left Date: 3/24/2025  Therapy precautions:Adhesive/tape allergy  ROM and may progress weightbearing as they feel comfortable  Co-morbidities affecting plan of care: Hx of DVT (LLE)    Payor: Payor: ISABELLA CARLOS /  /  /  Billing pattern: Commercial- substantial/midpoint time each CPT  Total Timed Codes: 30 min, Total Treatment Time: 55 min  Modifier needed: No    Episode visit count:  1     PERTINENT MEDICAL HISTORY     Past medical and surgical history:   Past Medical History:   Diagnosis Date    Anxiety     xanax    Hx of blood clots     after left knee ACL/meniscus tear DVT left calf; xarelto for 3 months then stopped - blood clot after surgery    IBS (irritable bowel syndrome)     Insomnia     ambien      Past Surgical History:   Procedure Laterality Date    BREAST SURGERY  2019    breast reduction    FINGER CLOSED REDUCTION Right 2023    ORIF right index finger performed by Nato Day MD at [facility]    GYN       x 1    GYN      laparoscopic    HAND SURGERY Right 2023    HAND HARDWARE REMOVAL right index finger performed by Nato Day MD at [facility]    HAND SURGERY Right 2023    PALMAR FASCIECTOMY right index finger performed by Nato Day MD at [facility]    HEENT      sinus surgery    KNEE ARTHROSCOPY Left 3/24/2025    LEFT KNEE ARTHROSCOPY PARTIAL  MEDIAL/LATERAL MENISCECTOMY performed by FELY Haney MD at

## 2025-03-28 NOTE — PROGRESS NOTES
Name: Mulugeta Monroy  YOB: 1980  Gender: female  MRN: [id number]    CC:   Chief Complaint   Patient presents with    Post-Op Check     1st P/O L knee surgery  DOS 3/24/25      Left Knee Arthroscopy Partial  Medial/lateral Meniscectomy - Left  3/24/2025    HPI: The patient comes in approximately 1 week out from Left Knee Arthroscopy Partial  Medial/lateral Meniscectomy - Left.  They are having some occasional pain and swelling, otherwise, doing well.      Review of Systems  Noncontributory     PE:    good active motion, trace effusion.  Portals are well healed without signs of infection.  Calf is soft, nontender.      A/Plan:     ICD-10-CM    1. Surgery follow-up  Z09       2. S/P medial meniscectomy of left knee  Z98.890       3. S/P lateral meniscectomy of left knee  Z98.890         Continue with the strengthening program.  Follow-up 3-4 weeks.  I did go over the arthroscopic findings with the patient.  They may have a prolonged or incomplete recovery. In the future it is possible that they may need injection, viscosupplementation or even a total knee arthroplasty.      No follow-ups on file.     GETACHEW Bauer  04/04/25

## 2025-04-01 ENCOUNTER — TREATMENT (OUTPATIENT)
Age: 45
End: 2025-04-01
Payer: COMMERCIAL

## 2025-04-01 DIAGNOSIS — Z78.9 IMPAIRED MOTOR CONTROL: ICD-10-CM

## 2025-04-01 DIAGNOSIS — Z74.09 IMPAIRED MOBILITY AND ADLS: ICD-10-CM

## 2025-04-01 DIAGNOSIS — Z78.9 IMPAIRED MOBILITY AND ADLS: ICD-10-CM

## 2025-04-01 DIAGNOSIS — Z74.09 DECREASED FUNCTIONAL MOBILITY AND ENDURANCE: ICD-10-CM

## 2025-04-01 DIAGNOSIS — M25.662 DECREASED RANGE OF MOTION (ROM) OF LEFT KNEE: ICD-10-CM

## 2025-04-01 DIAGNOSIS — M25.562 ACUTE PAIN OF LEFT KNEE: Primary | ICD-10-CM

## 2025-04-01 DIAGNOSIS — R29.898 WEAKNESS OF LEFT LOWER EXTREMITY: ICD-10-CM

## 2025-04-01 PROCEDURE — 97016 VASOPNEUMATIC DEVICE THERAPY: CPT

## 2025-04-01 PROCEDURE — 97110 THERAPEUTIC EXERCISES: CPT

## 2025-04-01 PROCEDURE — 97140 MANUAL THERAPY 1/> REGIONS: CPT

## 2025-04-01 NOTE — PROGRESS NOTES
[facility]THOPAED[facility]  [address] [facility] [address]  Dept: [phone]      Physical Therapy Daily Note     Referring MD: FELY Haney MD  Diagnosis:     ICD-10-CM    1. Acute pain of left knee  M25.562       2. Decreased range of motion (ROM) of left knee  M25.662       3. Weakness of left lower extremity  R29.898       4. Impaired motor control  Z78.9       5. Impaired mobility and ADLs  Z74.09     Z78.9       6. Decreased functional mobility and endurance  Z74.09          Surgery: Left Knee Arthroscopy Partial  Medial/lateral Meniscectomy - Left Date: 3/24/2025  Therapy precautions:Adhesive/tape allergy  ROM and may progress weightbearing as they feel comfortable  Co-morbidities affecting plan of care: Hx of DVT (LLE)    Chief complaints/history of injury: Patient presents 2 days s/p L medial and lateral meniscectomy. She reports longstanding difficulty with the L knee, along with a history of ACLR and DVT on the L    Date symptoms began: 3/24/25  Nature of condition: Recent onset (initial onset within last 3 months)  Primary cause of current episode: Post-surgical  How did symptoms start: see above  Describe current symptoms: stiffness of L knee, mild joint line soreness    Neuro screen: denies numbness, tingling, and radiating pain    Patient Stated Goals: restore strength and function    Initial Evaluation:  Last Progress Note: n/a  Total Visits: 2   Payor: Payor: ISABELLA CARLOS /  /  /   Billing pattern: Commercial- substantial/midpoint time each CPT    Total Timed Codes: 35 min, Total Treatment Time: 45 min  Modifier needed: No    SUBJECTIVE     Pt reports stiffness, but also has been driving for extended durations with work today.    Medications: no changes since last session    OBJECTIVE     Functional Outcome Questionnaire: Lower Extremity Functional Scale: 55/80= 68.8% function   Observation:   Posture: Weight shift R and Recurvatum R  Swelling/Edema:  TIFFANY due to dressing  Skin

## 2025-04-02 ENCOUNTER — OFFICE VISIT (OUTPATIENT)
Dept: ORTHOPEDIC SURGERY | Age: 45
End: 2025-04-02

## 2025-04-02 DIAGNOSIS — Z98.890 S/P LATERAL MENISCECTOMY OF LEFT KNEE: ICD-10-CM

## 2025-04-02 DIAGNOSIS — Z98.890 S/P MEDIAL MENISCECTOMY OF LEFT KNEE: ICD-10-CM

## 2025-04-02 DIAGNOSIS — Z09 SURGERY FOLLOW-UP: Primary | ICD-10-CM

## 2025-04-02 PROCEDURE — 99024 POSTOP FOLLOW-UP VISIT: CPT | Performed by: PHYSICIAN ASSISTANT

## 2025-04-03 RX ORDER — APIXABAN 2.5 MG/1
TABLET, FILM COATED ORAL
Qty: 28 TABLET | Refills: 0 | OUTPATIENT
Start: 2025-04-03

## 2025-04-04 ENCOUNTER — TREATMENT (OUTPATIENT)
Age: 45
End: 2025-04-04
Payer: COMMERCIAL

## 2025-04-04 DIAGNOSIS — M25.662 DECREASED RANGE OF MOTION (ROM) OF LEFT KNEE: ICD-10-CM

## 2025-04-04 DIAGNOSIS — Z78.9 IMPAIRED MOTOR CONTROL: ICD-10-CM

## 2025-04-04 DIAGNOSIS — M25.562 ACUTE PAIN OF LEFT KNEE: Primary | ICD-10-CM

## 2025-04-04 DIAGNOSIS — Z74.09 IMPAIRED MOBILITY AND ADLS: ICD-10-CM

## 2025-04-04 DIAGNOSIS — R29.898 WEAKNESS OF LEFT LOWER EXTREMITY: ICD-10-CM

## 2025-04-04 DIAGNOSIS — Z74.09 DECREASED FUNCTIONAL MOBILITY AND ENDURANCE: ICD-10-CM

## 2025-04-04 DIAGNOSIS — Z78.9 IMPAIRED MOBILITY AND ADLS: ICD-10-CM

## 2025-04-04 PROCEDURE — 97530 THERAPEUTIC ACTIVITIES: CPT

## 2025-04-04 PROCEDURE — 97110 THERAPEUTIC EXERCISES: CPT

## 2025-04-04 PROCEDURE — 97140 MANUAL THERAPY 1/> REGIONS: CPT

## 2025-04-04 PROCEDURE — 97016 VASOPNEUMATIC DEVICE THERAPY: CPT

## 2025-04-04 NOTE — PROGRESS NOTES
[facility]TH[facility]  [address] [facility] [address]  Dept: [phone]      Physical Therapy Daily Note     Referring MD: FELY Haney MD  Diagnosis:     ICD-10-CM    1. Acute pain of left knee  M25.562       2. Decreased range of motion (ROM) of left knee  M25.662       3. Weakness of left lower extremity  R29.898       4. Impaired motor control  Z78.9       5. Impaired mobility and ADLs  Z74.09     Z78.9       6. Decreased functional mobility and endurance  Z74.09          Surgery: Left Knee Arthroscopy Partial  Medial/lateral Meniscectomy - Left Date: 3/24/2025  Therapy precautions:Adhesive/tape allergy  ROM and may progress weightbearing as they feel comfortable  Co-morbidities affecting plan of care: Hx of DVT (LLE)    Chief complaints/history of injury: Patient presents 2 days s/p L medial and lateral meniscectomy. She reports longstanding difficulty with the L knee, along with a history of ACLR and DVT on the L    Date symptoms began: 3/24/25  Nature of condition: Recent onset (initial onset within last 3 months)  Primary cause of current episode: Post-surgical  How did symptoms start: see above  Describe current symptoms: stiffness of L knee, mild joint line soreness    Neuro screen: denies numbness, tingling, and radiating pain    Patient Stated Goals: restore strength and function    Initial Evaluation: 3/26/25  Last Progress Note: n/a  Total Visits: 3   Payor: Payor: ISABELLA CARLOS /  /  /   Billing pattern: Commercial- substantial/midpoint time each CPT    Total Timed Codes: 35 min, Total Treatment Time: 45 min  Modifier needed: No    SUBJECTIVE     Pt reports feeling a bit stiff, but notes her soreness has isolated to just below the patella.    Medications: no changes since last session    OBJECTIVE     Observation:   Posture: Weight shift R and Recurvatum R  Swelling/Edema:  TIFFANY due to dressing  Skin Integrity: honeycomb bandage intact    Atrophy: L quad (mod)  Palpation: TTP

## 2025-04-08 ENCOUNTER — TREATMENT (OUTPATIENT)
Age: 45
End: 2025-04-08
Payer: COMMERCIAL

## 2025-04-08 DIAGNOSIS — R29.898 WEAKNESS OF LEFT LOWER EXTREMITY: ICD-10-CM

## 2025-04-08 DIAGNOSIS — Z78.9 IMPAIRED MOBILITY AND ADLS: ICD-10-CM

## 2025-04-08 DIAGNOSIS — M25.662 DECREASED RANGE OF MOTION (ROM) OF LEFT KNEE: ICD-10-CM

## 2025-04-08 DIAGNOSIS — Z74.09 IMPAIRED MOBILITY AND ADLS: ICD-10-CM

## 2025-04-08 DIAGNOSIS — Z74.09 DECREASED FUNCTIONAL MOBILITY AND ENDURANCE: ICD-10-CM

## 2025-04-08 DIAGNOSIS — M25.562 ACUTE PAIN OF LEFT KNEE: Primary | ICD-10-CM

## 2025-04-08 DIAGNOSIS — Z78.9 IMPAIRED MOTOR CONTROL: ICD-10-CM

## 2025-04-08 PROCEDURE — 97140 MANUAL THERAPY 1/> REGIONS: CPT

## 2025-04-08 PROCEDURE — 97530 THERAPEUTIC ACTIVITIES: CPT

## 2025-04-08 PROCEDURE — 97110 THERAPEUTIC EXERCISES: CPT

## 2025-04-08 NOTE — PROGRESS NOTES
[facility] ORTHOPAED[facility]  [address] [facility] [address]  Dept: [phone]      Physical Therapy Daily Note     Referring MD: FELY Haney MD  Diagnosis:     ICD-10-CM    1. Acute pain of left knee  M25.562       2. Decreased range of motion (ROM) of left knee  M25.662       3. Weakness of left lower extremity  R29.898       4. Impaired motor control  Z78.9       5. Impaired mobility and ADLs  Z74.09     Z78.9       6. Decreased functional mobility and endurance  Z74.09          Surgery: Left Knee Arthroscopy Partial  Medial/lateral Meniscectomy - Left Date: 3/24/2025  Therapy precautions:Adhesive/tape allergy  ROM and may progress weightbearing as they feel comfortable  Co-morbidities affecting plan of care: Hx of DVT (LLE)    Chief complaints/history of injury: Patient presents 2 days s/p L medial and lateral meniscectomy. She reports longstanding difficulty with the L knee, along with a history of ACLR and DVT on the L    Date symptoms began: 3/24/25  Nature of condition: Recent onset (initial onset within last 3 months)  Primary cause of current episode: Post-surgical  How did symptoms start: see above  Describe current symptoms: stiffness of L knee, mild joint line soreness    Neuro screen: denies numbness, tingling, and radiating pain    Patient Stated Goals: restore strength and function    Initial Evaluation: 3/26/25  Last Progress Note: n/a  Total Visits: 4   Payor: Payor: ISABELLA CARLOS /  /  /   Billing pattern: Commercial- substantial/midpoint time each CPT    Total Timed Codes: 35 min, Total Treatment Time: 35 min  Modifier needed: No    SUBJECTIVE     Pt reports muscular soreness following her previous    Medications: no changes since last session    OBJECTIVE     Observation:   Posture: Weight shift R and Recurvatum R  Swelling/Edema:  TIFFANY due to dressing  Skin Integrity: honeycomb bandage intact    Atrophy: L quad (mod)  Palpation: TTP medial/lateral joint line  Patella mobility:

## 2025-04-15 ENCOUNTER — TREATMENT (OUTPATIENT)
Age: 45
End: 2025-04-15
Payer: COMMERCIAL

## 2025-04-15 DIAGNOSIS — M25.562 ACUTE PAIN OF LEFT KNEE: Primary | ICD-10-CM

## 2025-04-15 DIAGNOSIS — M25.662 DECREASED RANGE OF MOTION (ROM) OF LEFT KNEE: ICD-10-CM

## 2025-04-15 DIAGNOSIS — Z74.09 IMPAIRED MOBILITY AND ADLS: ICD-10-CM

## 2025-04-15 DIAGNOSIS — Z78.9 IMPAIRED MOTOR CONTROL: ICD-10-CM

## 2025-04-15 DIAGNOSIS — R29.898 WEAKNESS OF LEFT LOWER EXTREMITY: ICD-10-CM

## 2025-04-15 DIAGNOSIS — Z78.9 IMPAIRED MOBILITY AND ADLS: ICD-10-CM

## 2025-04-15 DIAGNOSIS — Z74.09 DECREASED FUNCTIONAL MOBILITY AND ENDURANCE: ICD-10-CM

## 2025-04-15 PROCEDURE — 97140 MANUAL THERAPY 1/> REGIONS: CPT

## 2025-04-15 PROCEDURE — 97110 THERAPEUTIC EXERCISES: CPT

## 2025-04-15 PROCEDURE — 97530 THERAPEUTIC ACTIVITIES: CPT

## 2025-04-15 NOTE — PROGRESS NOTES
demonstrating good functional strength for progression to dynamic loading  Pt will perform at least 10 consecutive repetitions of anterior step downs on 8 inch box (without dynamic knee valgus and good single limb stability symmetrical to contralateral side) demonstrating appropriate eccentric control and functional strength for progression to dynamic loading  Pt will perform Y balance test with composite score at least 90% of uninvolved limb demonstrating appropriate eccentric control and balance.  Pt will perform Single leg, Triple, and Crossover Hop Test within 70% of uninvolved limb demonstrating appropriate control, balance, and strength for progression to dynamic loading  Pt will complete IKDC to determine functional limitations.    Long term goals to be met by 5/25/2025 (60 days):  Pt will demonstrate MMT of 5/5 globally to allow for normal ADL's and functional activities and for reduced injury risk with return to prior level of function  Pt will demonstrate knee extension and flexion strength of involved limb at greater than or equal to 90% of uninvolved limb for reduced injury risk with return to prior level of function  Pt will perform at least 90% of repetitions of single leg squat in 30s with involved LE relative to uninvolved LE demonstrating good functional strength and control for reduced injury risk with return to prior level of function  Pt will perform at least 90% of repetitions of anterior step downs on 8 inch box in 30s with involved LE relative to uninvolved LE (without dynamic knee valgus and good single limb stability symmetrical to contralateral side) demonstrating appropriate eccentric control and functional strength for reduced injury risk with return to prior level of function  Pt will perform Y balance test with anterior reach within 4cm and posterior motions within 6cm of uninvolved limb demonstrating appropriate eccentric control and balance for reduced injury risk with return to prior

## 2025-04-18 ENCOUNTER — TREATMENT (OUTPATIENT)
Age: 45
End: 2025-04-18
Payer: COMMERCIAL

## 2025-04-18 DIAGNOSIS — Z78.9 IMPAIRED MOTOR CONTROL: ICD-10-CM

## 2025-04-18 DIAGNOSIS — Z74.09 IMPAIRED MOBILITY AND ADLS: ICD-10-CM

## 2025-04-18 DIAGNOSIS — M25.562 ACUTE PAIN OF LEFT KNEE: Primary | ICD-10-CM

## 2025-04-18 DIAGNOSIS — R29.898 WEAKNESS OF LEFT LOWER EXTREMITY: ICD-10-CM

## 2025-04-18 DIAGNOSIS — Z74.09 DECREASED FUNCTIONAL MOBILITY AND ENDURANCE: ICD-10-CM

## 2025-04-18 DIAGNOSIS — Z78.9 IMPAIRED MOBILITY AND ADLS: ICD-10-CM

## 2025-04-18 DIAGNOSIS — M25.662 DECREASED RANGE OF MOTION (ROM) OF LEFT KNEE: ICD-10-CM

## 2025-04-18 PROCEDURE — 97140 MANUAL THERAPY 1/> REGIONS: CPT

## 2025-04-18 PROCEDURE — 97530 THERAPEUTIC ACTIVITIES: CPT

## 2025-04-18 PROCEDURE — 97110 THERAPEUTIC EXERCISES: CPT

## 2025-04-18 RX ORDER — TRIAMCINOLONE ACETONIDE 5 MG/G
CREAM TOPICAL
COMMUNITY
Start: 2025-04-09

## 2025-04-18 NOTE — PROGRESS NOTES
Name: Mulugeta Monroy  YOB: 1980  Gender: female  MRN: [id number]    CC:   Chief Complaint   Patient presents with    Follow-up     Left knee surgery follow up      Left Knee Arthroscopy Partial  Medial/lateral Meniscectomy - Left  3/24/2025    HPI:   Patient doing well 6 weeks out from knee arthroscopy. They had Left Knee Arthroscopy Partial  Medial/lateral Meniscectomy - Left.    Review of Systems  Noncontributory     PE: they have good active motion.  Good quad tone and definition.  No tenderness.  Calf is soft.    A/Plan:     ICD-10-CM    1. Surgery follow-up  Z09       2. S/P medial meniscectomy of left knee  Z98.890       3. S/P lateral meniscectomy of left knee  Z98.890         They will continue with a home exercise program.    Use anti-inflammatories prn.    No follow-ups on file.     GETACHEW Bauer  04/23/25

## 2025-04-23 ENCOUNTER — OFFICE VISIT (OUTPATIENT)
Dept: ORTHOPEDIC SURGERY | Age: 45
End: 2025-04-23

## 2025-04-23 DIAGNOSIS — Z09 SURGERY FOLLOW-UP: Primary | ICD-10-CM

## 2025-04-23 DIAGNOSIS — Z98.890 S/P LATERAL MENISCECTOMY OF LEFT KNEE: ICD-10-CM

## 2025-04-23 DIAGNOSIS — Z98.890 S/P MEDIAL MENISCECTOMY OF LEFT KNEE: ICD-10-CM

## 2025-04-23 PROCEDURE — 99024 POSTOP FOLLOW-UP VISIT: CPT | Performed by: ORTHOPAEDIC SURGERY

## 2025-07-28 ENCOUNTER — APPOINTMENT (OUTPATIENT)
Dept: URBAN - METROPOLITAN AREA CLINIC 25 | Facility: CLINIC | Age: 45
Setting detail: DERMATOLOGY
End: 2025-07-28

## 2025-07-28 DIAGNOSIS — L81.5 LEUKODERMA, NOT ELSEWHERE CLASSIFIED: ICD-10-CM

## 2025-07-28 DIAGNOSIS — Z80.8 FAMILY HISTORY OF MALIGNANT NEOPLASM OF OTHER ORGANS OR SYSTEMS: ICD-10-CM

## 2025-07-28 DIAGNOSIS — Z71.89 OTHER SPECIFIED COUNSELING: ICD-10-CM

## 2025-07-28 DIAGNOSIS — L20.89 OTHER ATOPIC DERMATITIS: ICD-10-CM | Status: INADEQUATELY CONTROLLED

## 2025-07-28 DIAGNOSIS — L82.1 OTHER SEBORRHEIC KERATOSIS: ICD-10-CM

## 2025-07-28 DIAGNOSIS — L81.4 OTHER MELANIN HYPERPIGMENTATION: ICD-10-CM

## 2025-07-28 DIAGNOSIS — D22 MELANOCYTIC NEVI: ICD-10-CM

## 2025-07-28 PROBLEM — D22.5 MELANOCYTIC NEVI OF TRUNK: Status: ACTIVE | Noted: 2025-07-28

## 2025-07-28 PROCEDURE — ? COUNSELING

## 2025-07-28 PROCEDURE — ? PRESCRIPTION MEDICATION MANAGEMENT

## 2025-07-28 PROCEDURE — ? PRESCRIPTION

## 2025-07-28 RX ORDER — TACROLIMUS 1 MG/G
OINTMENT TOPICAL
Qty: 60 | Refills: 5 | Status: ERX | COMMUNITY
Start: 2025-07-28

## 2025-07-28 RX ORDER — CLOBETASOL PROPIONATE 0.5 MG/G
CREAM TOPICAL
Qty: 45 | Refills: 3 | Status: ERX | COMMUNITY
Start: 2025-07-28

## 2025-07-28 RX ADMIN — CLOBETASOL PROPIONATE: 0.5 CREAM TOPICAL at 00:00

## 2025-07-28 RX ADMIN — TACROLIMUS: 1 OINTMENT TOPICAL at 00:00

## 2025-07-28 ASSESSMENT — LOCATION SIMPLE DESCRIPTION DERM
LOCATION SIMPLE: LEFT HAND
LOCATION SIMPLE: RIGHT HAND
LOCATION SIMPLE: RIGHT PRETIBIAL REGION
LOCATION SIMPLE: UPPER BACK
LOCATION SIMPLE: RIGHT THIGH
LOCATION SIMPLE: RIGHT FOREHEAD
LOCATION SIMPLE: LEFT SUPERIOR EYELID
LOCATION SIMPLE: LEFT SHOULDER
LOCATION SIMPLE: RIGHT SUPERIOR EYELID
LOCATION SIMPLE: LEFT PRETIBIAL REGION

## 2025-07-28 ASSESSMENT — LOCATION DETAILED DESCRIPTION DERM
LOCATION DETAILED: LEFT DISTAL PRETIBIAL REGION
LOCATION DETAILED: RIGHT ANTERIOR DISTAL THIGH
LOCATION DETAILED: LEFT POSTERIOR SHOULDER
LOCATION DETAILED: RIGHT PROXIMAL PRETIBIAL REGION
LOCATION DETAILED: LEFT LATERAL SUPERIOR EYELID
LOCATION DETAILED: RIGHT DISTAL PRETIBIAL REGION
LOCATION DETAILED: RIGHT LATERAL FOREHEAD
LOCATION DETAILED: INFERIOR THORACIC SPINE
LOCATION DETAILED: RIGHT RADIAL DORSAL HAND
LOCATION DETAILED: RIGHT LATERAL SUPERIOR EYELID
LOCATION DETAILED: LEFT DORSAL MIDDLE FINGER METACARPOPHALANGEAL JOINT

## 2025-07-28 ASSESSMENT — LOCATION ZONE DERM
LOCATION ZONE: HAND
LOCATION ZONE: TRUNK
LOCATION ZONE: ARM
LOCATION ZONE: FACE
LOCATION ZONE: LEG
LOCATION ZONE: EYELID

## 2025-07-28 NOTE — PROCEDURE: PRESCRIPTION MEDICATION MANAGEMENT
Render In Strict Bullet Format?: No
Detail Level: Zone
Plan: We discussed patch testing if needed in the future, avoidance of actives in skincare regimen that may irritate inflamed skin
Discontinue Regimen: Triamcinolone 0.5% cream
Initiate Treatment: Tacrolimous 0.1% ointment to face BID PRN\\nClobetasol 0.05% cream to hands BID up to 2 weeks PRN for flares

## (undated) DEVICE — BNDG,ELSTC,MATRIX,STRL,2"X5YD,LF,HOOK&LP: Brand: MEDLINE

## (undated) DEVICE — INTENDED FOR TISSUE SEPARATION, AND OTHER PROCEDURES THAT REQUIRE A SHARP SURGICAL BLADE TO PUNCTURE OR CUT.: Brand: BARD-PARKER ® STAINLESS STEEL BLADES

## (undated) DEVICE — SOLUTION IRRIG 1000ML 0.9% SOD CHL USP POUR PLAS BTL

## (undated) DEVICE — SINGLE PORT MANIFOLD: Brand: NEPTUNE 2

## (undated) DEVICE — SUTURE NONABSORBABLE MONOFILAMENT 4-0 PS-2 18 IN BLU PROLENE 8682H

## (undated) DEVICE — BLADE SHV L13CM DIA4MM CVD DISECT AGG COOLCUT

## (undated) DEVICE — KNEE ARTHROSCOPY DR KOCH: Brand: MEDLINE INDUSTRIES, INC.

## (undated) DEVICE — HAND PACK: Brand: MEDLINE INDUSTRIES, INC.

## (undated) DEVICE — ZIMMER® STERILE DISPOSABLE TOURNIQUET CUFF WITH PLC, DUAL PORT, SINGLE BLADDER, 30 IN. (76 CM)

## (undated) DEVICE — MASTISOL ADHESIVE LIQ 2/3ML

## (undated) DEVICE — STERILE POLYISOPRENE POWDER-FREE SURGICAL GLOVES: Brand: PROTEXIS

## (undated) DEVICE — T-DRAPE,EXTREMITY,STERILE: Brand: MEDLINE

## (undated) DEVICE — SUTURE FIBERLOOP SZ 2-0 L20IN NONABSORBABLE BLU STR NDL AR7234

## (undated) DEVICE — OUTFLOW CASSETTE TUBING, DO NOT USE IF PACKAGE IS DAMAGED: Brand: CROSSFLOW

## (undated) DEVICE — GOWN,SIRUS,NONRNF,SETINSLV,XL,20/CS: Brand: MEDLINE

## (undated) DEVICE — STERILE HOOK LOCK LATEX FREE ELASTIC BANDAGE 6INX5YD: Brand: HOOK LOCK™

## (undated) DEVICE — KNEE ARTHROSCOPY PACK: Brand: MEDLINE INDUSTRIES, INC.

## (undated) DEVICE — BANDAGE,ELASTIC,ESMARK,STERILE,4"X9',LF: Brand: MEDLINE

## (undated) DEVICE — SUTURE TIGERSTICK TIGERWIRE SZ 2-0 L50IN NONABSORBABLE AR7209T

## (undated) DEVICE — WEREWOLF FLOW 90 COBLATION WAND: Brand: COBLATION

## (undated) DEVICE — TUBING PMP L16FT MAIN DISP FOR AR-6400 AR-6475 Â€“ ORDER MULTIPLES OF 10 EACH

## (undated) DEVICE — 4-PORT MANIFOLD: Brand: NEPTUNE 2

## (undated) DEVICE — ZIP 8I SURGICAL SKIN CLOSURE DEVICE: Brand: ZIP 8I SURGICAL SKIN CLOSURE DEVICE

## (undated) DEVICE — GLOVE ORANGE PI 7 1/2   MSG9075

## (undated) DEVICE — SOLUTION IRRIG 3000ML 0.9% SOD CHL FLX CONT 0797208] ICU MEDICAL INC]

## (undated) DEVICE — SKIN MARKER,REGULAR TIP WITH RULER AND LABELS: Brand: DEVON

## (undated) DEVICE — PADDING CAST W3INXL4YD COT BLEND MIC PLEAT UNDERCAST SPEC

## (undated) DEVICE — STRIP,CLOSURE,WOUND,MEDI-STRIP,1/2X4: Brand: MEDLINE

## (undated) DEVICE — BUTTON SWITCH PENCIL BLADE ELECTRODE, HOLSTER: Brand: EDGE

## (undated) DEVICE — REM POLYHESIVE ADULT PATIENT RETURN ELECTRODE: Brand: VALLEYLAB

## (undated) DEVICE — DISPOSABLE BIPOLAR CODE, 12' (3.66 M): Brand: CONMED

## (undated) DEVICE — DRESSING,GAUZE,XEROFORM,CURAD,1"X8",ST: Brand: CURAD

## (undated) DEVICE — DRAPE,U/SHT,SPLIT,FILM,60X84,STERILE: Brand: MEDLINE

## (undated) DEVICE — SUTURE STRATAFIX SPRL MCRYL + SZ 3-0 L18IN ABSRB UD PS-2 SXMP1B107

## (undated) DEVICE — SOL IRR SOD CHL 0.9% TITAN XL CNTNR 3000ML

## (undated) DEVICE — GLOVE SURG SZ 65 CRM LTX FREE POLYISOPRENE POLYMER BEAD ANTI

## (undated) DEVICE — SUTURE TIGERLOOP SZ 2-0 L20IN NONABSORBABLE WHT GRN BRAID AR7234T

## (undated) DEVICE — BLADE SURG NO15 S STL STR DISP GLASSVAN

## (undated) DEVICE — 2DE12 2-0 UNDYD MONODERM 14X14: Brand: 2DE12 2-0 UNDYD MONODERM 14X14

## (undated) DEVICE — BNDG,ELSTC,MATRIX,STRL,3"X5YD,LF,HOOK&LP: Brand: MEDLINE

## (undated) DEVICE — BANDAGE GZ W2XL75IN ST RAYON POLY CNFRM STRTCH LTWT

## (undated) DEVICE — GOWN,PREVENTION PLUS,XL,ST,24/CS: Brand: MEDLINE

## (undated) DEVICE — SUTURE MCRYL SZ 2-0 L27IN ABSRB UD CP-1 1 L36MM 1/2 CIR REV Y266H

## (undated) DEVICE — BANDAGE COBAN 6 IN WND 6INX5YD FOAM

## (undated) DEVICE — SUTURE NONABSORBABLE MONOFILAMENT 5-0 M1 P-3 18 IN PROLENE 8698H

## (undated) DEVICE — INFLOW CASSETTE TUBING, DO NOT USE IF PACKAGE IS DAMAGED: Brand: CROSSFLOW

## (undated) DEVICE — SUTURE FIBERWIRE FIBERSTICK SZ 2-0 L50/12IN NONABSORBABLE AR7209

## (undated) DEVICE — KNIFE SURG 10MM GRFT DISP FOR ACL RECON

## (undated) DEVICE — BIT DRL L70/39MM DIA1.1MM FOR PILOT H JCBS CHK 1.5MM SCR

## (undated) DEVICE — TUBING, SUCTION, 1/4" X 10', STRAIGHT: Brand: MEDLINE

## (undated) DEVICE — SUTURE MCRYL SZ 3-0 L27IN ABSRB UD L19MM PS-2 3/8 CIR PRIM Y427H

## (undated) DEVICE — SUTURE FIBERWIRE SZ 2-0 L38IN BLU L26.5MM DMND PT STR NDL AR7246

## (undated) DEVICE — SUTURE ETHLN SZ 3-0 L18IN NONABSORBABLE BLK PS-2 L19MM 3/8 1669H

## (undated) DEVICE — DRAPE,ARTHRO,W/POUCH,STERILE: Brand: MEDLINE

## (undated) DEVICE — SUTURE PROL SZ 6-0 L18IN NONABSORBABLE BLU L36MM P-1 3/8 8697G

## (undated) DEVICE — CARDINAL HEALTH FLEXIBLE LIGHT HANDLE COVER: Brand: CARDINAL HEALTH

## (undated) DEVICE — GLOVE SURG SZ 8 L12IN FNGR THK79MIL GRN LTX FREE

## (undated) DEVICE — GLOVE SURG SZ 7 L12IN FNGR THK79MIL GRN LTX FREE

## (undated) DEVICE — DRAPE TWL SURG 16X26IN BLU ORB04] ALLCARE INC]

## (undated) DEVICE — STOCKINETTE,IMPERVIOUS,12X48,STERILE: Brand: MEDLINE

## (undated) DEVICE — 3M™ IOBAN™ 2 ANTIMICROBIAL INCISE DRAPE 6650EZ: Brand: IOBAN™ 2

## (undated) DEVICE — WATERPROOF, BACTERIA PROOF DRESSING WITH ABSORBENT SEE THROUGH PAD: Brand: OPSITE POST-OP VISIBLE 15X10CM CTN 20

## (undated) DEVICE — Device

## (undated) DEVICE — C-ARM: Brand: UNBRANDED

## (undated) DEVICE — SOFT SILICONE HYDROCELLULAR FOAM DRESSING WITH LOCK AWAY LAYER: Brand: ALLEVYN LIFE XL 21X21 CTN10

## (undated) DEVICE — BLADE SHV DIA4MM RED RESECT FOR GEN DEB REM OF PERIOST FR

## (undated) DEVICE — NDL SPNE QNCKE 18GX3.5IN LF --

## (undated) DEVICE — PREP SKN CHLRAPRP APL 26ML STR --

## (undated) DEVICE — DRAPE, FILM SHEET, 44X65 STERILE: Brand: MEDLINE

## (undated) DEVICE — BANDAGE COMPR W6INXL15YD WHT BGE POLY COT WV E HK LOOP CLSR

## (undated) DEVICE — DRILL SURG FLIPCUTTER III

## (undated) DEVICE — BANDAGE COMPR L5YDXW2IN FOAM CO FLX

## (undated) DEVICE — COTTON UNDERCAST PADDING,REGULAR FINISH: Brand: WEBRIL

## (undated) DEVICE — SUTURE VCRL RAPIDE SZ 4-0 L18IN ABSRB UD L19MM PS-2 1/2 CIR VR496